# Patient Record
Sex: FEMALE | Race: WHITE | HISPANIC OR LATINO | Employment: OTHER | URBAN - METROPOLITAN AREA
[De-identification: names, ages, dates, MRNs, and addresses within clinical notes are randomized per-mention and may not be internally consistent; named-entity substitution may affect disease eponyms.]

---

## 2017-07-05 ENCOUNTER — LAB REQUISITION (OUTPATIENT)
Dept: LAB | Facility: HOSPITAL | Age: 62
End: 2017-07-05
Payer: COMMERCIAL

## 2017-07-05 ENCOUNTER — ALLSCRIPTS OFFICE VISIT (OUTPATIENT)
Dept: OTHER | Facility: OTHER | Age: 62
End: 2017-07-05

## 2017-07-05 DIAGNOSIS — Z01.419 ENCOUNTER FOR GYNECOLOGICAL EXAMINATION WITHOUT ABNORMAL FINDING: ICD-10-CM

## 2017-07-05 PROCEDURE — G0145 SCR C/V CYTO,THINLAYER,RESCR: HCPCS | Performed by: OBSTETRICS & GYNECOLOGY

## 2017-07-12 LAB
LAB AP GYN PRIMARY INTERPRETATION: NORMAL
Lab: NORMAL

## 2017-08-26 DIAGNOSIS — Z12.31 ENCOUNTER FOR SCREENING MAMMOGRAM FOR MALIGNANT NEOPLASM OF BREAST: ICD-10-CM

## 2017-09-05 LAB
ALBUMIN SERPL-MCNC: 4 G/DL (ref 3.6–5.1)
ALBUMIN/GLOB SERPL: 1.3 (CALC) (ref 1–2.5)
ALP SERPL-CCNC: 108 U/L (ref 33–130)
ALT SERPL-CCNC: 11 U/L (ref 6–29)
AST SERPL-CCNC: 11 U/L (ref 10–35)
BASOPHILS # BLD AUTO: 38 CELLS/UL (ref 0–200)
BASOPHILS NFR BLD AUTO: 0.7 %
BILIRUB SERPL-MCNC: 0.4 MG/DL (ref 0.2–1.2)
BUN SERPL-MCNC: 11 MG/DL (ref 7–25)
BUN/CREAT SERPL: ABNORMAL (CALC) (ref 6–22)
C PEPTIDE SERPL-MCNC: 0.52 NG/ML (ref 0.8–3.85)
CALCIUM SERPL-MCNC: 9.3 MG/DL (ref 8.6–10.4)
CHLORIDE SERPL-SCNC: 107 MMOL/L (ref 98–110)
CHOLEST SERPL-MCNC: 238 MG/DL
CHOLEST/HDLC SERPL: 5.8 (CALC)
CO2 SERPL-SCNC: 28 MMOL/L (ref 20–31)
CREAT SERPL-MCNC: 0.56 MG/DL (ref 0.5–0.99)
EOSINOPHIL # BLD AUTO: 140 CELLS/UL (ref 15–500)
EOSINOPHIL NFR BLD AUTO: 2.6 %
ERYTHROCYTE [DISTWIDTH] IN BLOOD BY AUTOMATED COUNT: 12.4 % (ref 11–15)
GLOBULIN SER CALC-MCNC: 3 G/DL (CALC) (ref 1.9–3.7)
GLUCOSE SERPL-MCNC: 100 MG/DL (ref 65–99)
HBA1C MFR BLD: 11.3 % OF TOTAL HGB
HCT VFR BLD AUTO: 41.7 % (ref 35–45)
HDLC SERPL-MCNC: 41 MG/DL
HGB BLD-MCNC: 13.7 G/DL (ref 11.7–15.5)
LDLC SERPL CALC-MCNC: 171 MG/DL (CALC)
LYMPHOCYTES # BLD AUTO: 2074 CELLS/UL (ref 850–3900)
LYMPHOCYTES NFR BLD AUTO: 38.4 %
MCH RBC QN AUTO: 29 PG (ref 27–33)
MCHC RBC AUTO-ENTMCNC: 32.9 G/DL (ref 32–36)
MCV RBC AUTO: 88.3 FL (ref 80–100)
MONOCYTES # BLD AUTO: 297 CELLS/UL (ref 200–950)
MONOCYTES NFR BLD AUTO: 5.5 %
NEUTROPHILS # BLD AUTO: 2851 CELLS/UL (ref 1500–7800)
NEUTROPHILS NFR BLD AUTO: 52.8 %
NONHDLC SERPL-MCNC: 197 MG/DL (CALC)
PLATELET # BLD AUTO: 337 THOUSAND/UL (ref 140–400)
PMV BLD REES-ECKER: 10.8 FL (ref 7.5–12.5)
POTASSIUM SERPL-SCNC: 4.6 MMOL/L (ref 3.5–5.3)
PROT SERPL-MCNC: 7 G/DL (ref 6.1–8.1)
RBC # BLD AUTO: 4.72 MILLION/UL (ref 3.8–5.1)
SL AMB EGFR AFRICAN AMERICAN: 117 ML/MIN/1.73M2
SL AMB EGFR NON AFRICAN AMERICAN: 101 ML/MIN/1.73M2
SODIUM SERPL-SCNC: 142 MMOL/L (ref 135–146)
TRIGL SERPL-MCNC: 128 MG/DL
TSH SERPL-ACNC: 1.96 MIU/L (ref 0.4–4.5)
WBC # BLD AUTO: 5.4 THOUSAND/UL (ref 3.8–10.8)

## 2017-11-15 ENCOUNTER — ALLSCRIPTS OFFICE VISIT (OUTPATIENT)
Dept: OTHER | Facility: OTHER | Age: 62
End: 2017-11-15

## 2017-11-15 ENCOUNTER — HOSPITAL ENCOUNTER (OUTPATIENT)
Dept: RADIOLOGY | Facility: HOSPITAL | Age: 62
Discharge: HOME | End: 2017-11-15
Attending: ORTHOPAEDIC SURGERY
Payer: COMMERCIAL

## 2017-11-15 ENCOUNTER — APPOINTMENT (OUTPATIENT)
Dept: OCCUPATIONAL THERAPY | Facility: HOSPITAL | Age: 62
End: 2017-11-15
Payer: COMMERCIAL

## 2017-11-15 DIAGNOSIS — M65.332 TRIGGER MIDDLE FINGER OF LEFT HAND: ICD-10-CM

## 2017-11-15 PROCEDURE — G8991 OTHER PT/OT GOAL STATUS: HCPCS

## 2017-11-15 PROCEDURE — L3913 HFO W/O JOINTS CF: HCPCS

## 2017-11-15 PROCEDURE — 73140 X-RAY EXAM OF FINGER(S): CPT

## 2017-11-15 PROCEDURE — G8990 OTHER PT/OT CURRENT STATUS: HCPCS

## 2017-11-16 NOTE — PROGRESS NOTES
Assessment    1  Trigger middle finger of left hand (727 03) (M65 332)    Plan  Trigger middle finger of left hand    · [facility] Instruction Sheet Hand Therapy Given; Status:Complete;   Done: [id number]  Unlinked    · * XR FINGER LEFT THIRD DIGIT-MIDDLE; Status:Active; Requested for:87Xlx9526;     Discussion/Summary    X-rays and MRIs were reviewed with patient and her daughter  versus nonconservative treatment options for trigger finger were discussed, risks versus benefits and prognosis  this time, due to patient's comorbid diabetes, we will not to administer a corticosteroid injection, but instead will have physical therapy fashion a finger splint and demonstrate home exercises  Weightbearing and activities as toleratedif condition worsensin 4-6 weeks for reevaluation      Attestation:  Jon Jorge MD, personally performed the services described in this documentation as scribed in my presence  Chief Complaint  Patient presents for left long finger pain times several months  History of Present Illness  HPI: Patient is a 44-year-old, right-hand-dominant female who presents for left long finger pain and stiffness times several months  Patient reports pain began in April 2017 after suffering an injury to the said finger while at work  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 and having finger become lodged between 2 pieces of metal on the assembly line  Patient felt pain when she attempted to pull her finger away  She was seen initially by the nurse at her place of employment  Subsequently she received x-rays and an MRI  X-rays were negative   MRI of left long finger demonstrates bone marrow edema consistent with a deep bone bruise, flexor tenosynovitis, and a small ganglion cyst at the proximal PIP joint  is here today with continued complaints of left long finger pain beginning at the base of the left long finger and radiating distally to the PIP joint   She notes associated stiffness and difficulty with flexion of the same finger at the PIP joint  She denies any numbness or tingling  is significant for DM 2past medical, surgical, family, and social history as well as medications and allergies were reviewed  Updates as needed were performed  Review of systems was recorded on a separate intake sheet, this was reviewed as well  Review of Systems   Constitutional: no fever,-- not feeling poorly-- and-- no chills  Eyes: eyes not red  ENT: no hearing loss-- and-- no nosebleeds  Cardiovascular: no chest pain-- and-- no palpitations  Respiratory: no shortness of breath-- and-- no wheezing  Gastrointestinal: no abdominal pain  Musculoskeletal: as noted in HPI  Integumentary: no skin lesions  Neurological: no numbness-- and-- no tingling  Active Problems    1  Age related osteoporosis (733 01) (M81 0)   2  Diabetes Mellitus (250 00)   3  Encounter for routine gynecological examination with Papanicolaou smear of cervix (V72 31,V76 2) (Z01 419)   4  Encounter for screening mammogram for malignant neoplasm of breast (V76 12) (Z12 31)   5  Menopausal symptom (627 2) (N95 1)   6  Osteopenia (733 90) (M85 80)   7  Osteoporosis, postmenopausal (733 01) (M81 0)   8  Postmenopausal atrophic vaginitis (627 3) (N95 2)   9  Skin rash (782 1) (R21)   10  Vaginal candidiasis (112 1) (B37 3)   11  Vulvitis (616 10) (N76 2)   12   Vulvovaginitis candida albicans (112 1) (B37 3)    Past Medical History   · History of DEXA Body Composition Study   · History of type 2 diabetes mellitus (V12 29) (Z86 39)   · History of Reported Pap Smear   · History of Summary Of Previous Pregnancies  3  (Total No )   · History of Summary Of Previous Pregnancies Para 3  (Deliveries)   · History of Tetanus (037)    Family History   · Family history of myocardial infarction (V17 3) (Z82 49)   · Family history of myocardial infarction (V17 3) (Z82 49)    Social History     · Current Every Day Smoker (305 1)   · No alcohol use   · Denied: History of Physical Abuse (History)    Current Meds   1  Boniva 150 MG Oral Tablet; TAKE 1 TABLET ONCE MONTHLY WITH 8 TO 10 OUNCES OF WATER   STAY UPRIGHT AND DO NOT EAT OR DRINK FOR 1 HOUR; Therapy: 92CWR2796 to (Evaluate:68Otr5983)  Requested for: 67DOE5747; Last Rx:83Jah1418 Ordered   2  Caltrate 600+D 600-400 MG-UNIT TABS; Therapy: (Recorded:70Mbb6804) to Recorded   3  Lalitha Reasoner FlexTouch SOPN; Therapy: (Recorded:36Dnv9418) to Recorded   4  Victoza 18 MG/3ML SOLN Recorded    Allergies  1  No Known Drug Allergies    Vitals  Signs     Heart Rate: 80  Systolic: 355  Diastolic: 74  Height: 4 ft 11 5 in  Weight: 129 lb 2 oz  BMI Calculated: 25 64  BSA Calculated: 1 54    Physical Exam     General: No acute distress, age-appropriate  HEENT: Normocephalic atraumatic, mucous membranes are moist, sclera are nonicteric  Cardiovascular: No discernable arrhythmia  Respiratory: Breathing is even and unlabored, no stridor or audible wheezing  Left Basic:  Left hand/digits  He is no gross deformity erythema edema or ecchymosis  There is mild tenderness to palpation at the base of the left long finger  With slight crepitus noted upon flexion at the PIP joint  There is a small firm tender nodule present at her about the A1 pulley  Sensation is intact distally  His capillary refill no instability noted  Future Appointments    Date/Time Provider Specialty Site   12/20/2017 04:45 PM MIN Esquivel   Orthopedic Surgery [facility]       Signatures   Electronically signed by : KINGSLEY Guthrie; Nov 15 2017  5:11PM EST                       (Author)    Electronically signed by : MIN Hernandez ; Nov 15 2017  5:25PM EST                       (Author)

## 2018-01-12 NOTE — CONSULTS
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 and having finger become lodged between 2 pieces of metal on the assembly line  Patient felt pain when she attempted to pull her finger away  She was seen initially by the nurse at her place of employment  Subsequently she received x-rays and an MRI  X-rays were negative   MRI of left long finger demonstrates bone marrow edema consistent with a deep bone bruise, flexor tenosynovitis, and a small ganglion cyst at the proximal PIP joint  Patient is here today with continued complaints of left long finger pain beginning at the base of the left long finger and radiating distally to the PIP joint  She notes associated stiffness and difficulty with flexion of the same finger at the PIP joint  She denies any numbness or tingling  PMH is significant for DM 2  The past medical, surgical, family, and social history as well as medications and allergies were reviewed  Updates as needed were performed  Review of systems was recorded on a separate intake sheet, this was reviewed as well  Review of Systems    Constitutional: no fever, not feeling poorly and no chills  Eyes: eyes not red  ENT: no hearing loss and no nosebleeds  Cardiovascular: no chest pain and no palpitations  Respiratory: no shortness of breath and no wheezing  Gastrointestinal: no abdominal pain  Musculoskeletal: as noted in HPI  Integumentary: no skin lesions  Neurological: no numbness and no tingling  Active Problems    1  Age related osteoporosis (733 01) (M81 0)   2  Diabetes Mellitus (250 00)   3   Encounter for routine gynecological examination with Papanicolaou smear of cervix   (V72 31,V76 2) (Z01 419)   4  Encounter for screening mammogram for malignant neoplasm of breast (V76 12)   (Z12 31)   5  Menopausal symptom (627 2) (N95 1)   6  Osteopenia (733 90) (M85 80)   7  Osteoporosis, postmenopausal (733 01) (M81 0)   8  Postmenopausal atrophic vaginitis (627 3) (N95 2)   9  Skin rash (782 1) (R21)   10  Vaginal candidiasis (112 1) (B37 3)   11  Vulvitis (616 10) (N76 2)   12  Vulvovaginitis candida albicans (112 1) (B37 3)    Past Medical History    · History of DEXA Body Composition Study   · History of type 2 diabetes mellitus (V12 29) (Z86 39)   · History of Reported Pap Smear   · History of Summary Of Previous Pregnancies  3  (Total No )   · History of Summary Of Previous Pregnancies Para 3  (Deliveries)   · History of Tetanus (037)    Family History    · Family history of myocardial infarction (V17 3) (Z82 49)    · Family history of myocardial infarction (V17 3) (Z82 49)    Social History    · Current Every Day Smoker (305 1)   · No alcohol use   · Denied: History of Physical Abuse (History)    Current Meds   1  Boniva 150 MG Oral Tablet; TAKE 1 TABLET ONCE MONTHLY WITH 8 TO 10 OUNCES   OF WATER   STAY UPRIGHT AND DO NOT EAT OR DRINK FOR 1 HOUR;   Therapy: 23YRT9046 to (Evaluate:2018)  Requested for: 70VRF2214; Last   Rx:12Tti9947 Ordered   2  Caltrate 600+D 600-400 MG-UNIT TABS; Therapy: (Recorded:52Ier1091) to Recorded   3  Candy [place] FlexTouch SOPN;   Therapy: (Recorded:34Hbm2707) to Recorded   4  Victoza 18 MG/3ML SOLN Recorded    Allergies    1  No Known Drug Allergies    Vitals  Signs    Heart Rate: 24QFIISGOZ: 369CAKCEHTQK: 33ZFFZRR: 4 ft 45 6 inWeight: 129 lb 2 ozBMI Calculated: 25 64BSA Calculated: 1 54    Physical Exam      General: No acute distress, age-appropriate  HEENT: Normocephalic atraumatic, mucous membranes are moist, sclera are nonicteric  Cardiovascular: No discernable arrhythmia  Respiratory: Breathing is even and unlabored, no stridor or audible wheezing  Left Basic:   Left hand/digits  He is no gross deformity erythema edema or ecchymosis  There is mild tenderness to palpation at the base of the left long finger  With slight crepitus noted upon flexion at the PIP joint  There is a small firm tender nodule present at her about the A1 pulley  Sensation is intact distally  His capillary refill no instability noted  Assessment    1  Trigger middle finger of left hand (727 03) (M65 332)    Plan     Trigger middle finger of left hand    · [facility] Instruction Sheet Hand Therapy Given; Status:Complete;   Done: 58LRE1947     Unlinked    · * XR FINGER LEFT THIRD DIGIT-MIDDLE; Status:Active; Requested for:14Leu5797;     Discussion/Summary    X-rays and MRIs were reviewed with patient and her daughter  Conservative versus nonconservative treatment options for trigger finger were discussed, risks versus benefits and prognosis  At this time, due to patient's comorbid diabetes, we will not to administer a corticosteroid injection, but instead will have physical therapy fashion a finger splint and demonstrate home exercises  Weightbearing and activities as tolerated  Call if condition worsens  Follow-up in 4-6 weeks for reevaluation        Physician Attestation:    Gianfranco Patel MD, personally performed the services described in this documentation as scribed in my presence         Signatures   Electronically signed by : KINGSLEY Harvey; Nov 15 2017  5:11PM EST                       (Author)    Electronically signed by : MIN Bello ; Nov 15 2017  5:25PM EST                       (Author)

## 2018-01-13 VITALS
WEIGHT: 129.13 LBS | DIASTOLIC BLOOD PRESSURE: 74 MMHG | BODY MASS INDEX: 25.35 KG/M2 | SYSTOLIC BLOOD PRESSURE: 151 MMHG | HEIGHT: 60 IN | HEART RATE: 80 BPM

## 2018-01-13 VITALS
DIASTOLIC BLOOD PRESSURE: 90 MMHG | HEIGHT: 60 IN | BODY MASS INDEX: 25.35 KG/M2 | WEIGHT: 129.13 LBS | SYSTOLIC BLOOD PRESSURE: 164 MMHG

## 2018-01-14 NOTE — CONSULTS
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 and having finger become lodged between 2 pieces of metal on the assembly line  Patient felt pain when she attempted to pull her finger away  She was seen initially by the nurse at her place of employment  Subsequently she received x-rays and an MRI  X-rays were negative   MRI of left long finger demonstrates bone marrow edema consistent with a deep bone bruise, flexor tenosynovitis, and a small ganglion cyst at the proximal PIP joint  Patient is here today with continued complaints of left long finger pain beginning at the base of the left long finger and radiating distally to the PIP joint  She notes associated stiffness and difficulty with flexion of the same finger at the PIP joint  She denies any numbness or tingling  PMH is significant for DM 2  The past medical, surgical, family, and social history as well as medications and allergies were reviewed  Updates as needed were performed  Review of systems was recorded on a separate intake sheet, this was reviewed as well  Review of Systems  Focused-Female Orthopedics:   Constitutional: no fever, not feeling poorly and no chills  Eyes: eyes not red  ENT: no hearing loss and no nosebleeds  Cardiovascular: no chest pain and no palpitations  Respiratory: no shortness of breath and no wheezing  Gastrointestinal: no abdominal pain  Musculoskeletal: as noted in HPI  Integumentary: no skin lesions  Neurological: no numbness and no tingling  Active Problems    1   Age related osteoporosis (983 01) (M81 0)   2  Diabetes Mellitus (250 00)   3  Encounter for routine gynecological examination with Papanicolaou smear of cervix   (V72 31,V76 2) (Z01 419)   4  Encounter for screening mammogram for malignant neoplasm of breast (V76 12)   (Z12 31)   5  Menopausal symptom (627 2) (N95 1)   6  Osteopenia (733 90) (M85 80)   7  Osteoporosis, postmenopausal (733 01) (M81 0)   8  Postmenopausal atrophic vaginitis (627 3) (N95 2)   9  Skin rash (782 1) (R21)   10  Vaginal candidiasis (112 1) (B37 3)   11  Vulvitis (616 10) (N76 2)   12  Vulvovaginitis candida albicans (112 1) (B37 3)    Past Medical History    1  History of DEXA Body Composition Study   2  History of type 2 diabetes mellitus (V12 29) (Z86 39)   3  History of Reported Pap Smear   4  History of Summary Of Previous Pregnancies  3  (Total No )   5  History of Summary Of Previous Pregnancies Para 3  (Deliveries)   6  History of Tetanus (037)    Family History    1  Family history of myocardial infarction (V17 3) (Z82 49)    2  Family history of myocardial infarction (V17 3) (Z82 49)    Social History    · Current Every Day Smoker (305 1)   · No alcohol use   · Denied: History of Physical Abuse (History)    Current Meds   1  Boniva 150 MG Oral Tablet; TAKE 1 TABLET ONCE MONTHLY WITH 8 TO 10 OUNCES   OF WATER   STAY UPRIGHT AND DO NOT EAT OR DRINK FOR 1 HOUR;   Therapy: 52XBU4374 to (Evaluate:2018)  Requested for: 43FWS4397; Last   Rx:87Rsv8081 Ordered   2  Caltrate 600+D 600-400 MG-UNIT TABS; Therapy: (Recorded:23Gve6024) to Recorded   3  The[place] FlexTouch SOPN;   Therapy: (Recorded:87Vgs5134) to Recorded   4  Victoza 18 MG/3ML SOLN Recorded    Allergies    1  No Known Drug Allergies    Vitals  Signs   Recorded: 07JAF8591 04:15PM   Heart Rate: 80  Systolic: 331  Diastolic: 74  Height: 4 ft 11 5 in  Weight: 129 lb 2 oz  BMI Calculated: 25 64  BSA Calculated: 1 54    Physical Exam      General: No acute distress, age-appropriate     HEENT: Normocephalic atraumatic, mucous membranes are moist, sclera are nonicteric  Cardiovascular: No discernable arrhythmia  Respiratory: Breathing is even and unlabored, no stridor or audible wheezing  Left Basic:   Left hand/digits  He is no gross deformity erythema edema or ecchymosis  There is mild tenderness to palpation at the base of the left long finger  With slight crepitus noted upon flexion at the PIP joint  There is a small firm tender nodule present at her about the A1 pulley  Sensation is intact distally  His capillary refill no instability noted  Assessment    1  Trigger middle finger of left hand (727 03) (M65 332)    Plan  Trigger middle finger of left hand    1  [facility] Instruction Sheet Hand Therapy Given; Status:Complete;   Done: 35MLR6278  Unlinked    2  * XR FINGER LEFT THIRD DIGIT-MIDDLE; Status:Active; Requested for:47Aeg1312;     Discussion/Summary  Discussion Summary:   X-rays and MRIs were reviewed with patient and her daughter  Conservative versus nonconservative treatment options for trigger finger were discussed, risks versus benefits and prognosis  At this time, due to patient's comorbid diabetes, we will not to administer a corticosteroid injection, but instead will have physical therapy fashion a finger splint and demonstrate home exercises  Weightbearing and activities as tolerated  Call if condition worsens  Follow-up in 4-6 weeks for reevaluation        Physician Attestation:    Santana Black MD, personally performed the services described in this documentation as scribed in my presence         Future Appointments    Signatures   Electronically signed by : KINGSLEY Zaragoza; Nov 15 2017  5:11PM EST                       (Author)    Electronically signed by : MIN Mcmahan ; Nov 15 2017  5:25PM EST                       (Author)

## 2018-08-17 ENCOUNTER — APPOINTMENT (OUTPATIENT)
Dept: LAB | Age: 63
End: 2018-08-17
Payer: COMMERCIAL

## 2018-08-17 ENCOUNTER — APPOINTMENT (OUTPATIENT)
Dept: LAB | Facility: MEDICAL CENTER | Age: 63
End: 2018-08-17
Payer: COMMERCIAL

## 2018-08-17 ENCOUNTER — TRANSCRIBE ORDERS (OUTPATIENT)
Dept: ADMINISTRATIVE | Facility: HOSPITAL | Age: 63
End: 2018-08-17

## 2018-08-17 DIAGNOSIS — I10 HYPERTENSION, UNSPECIFIED TYPE: ICD-10-CM

## 2018-08-17 DIAGNOSIS — Z79.899 ENCOUNTER FOR LONG-TERM (CURRENT) USE OF MEDICATIONS: ICD-10-CM

## 2018-08-17 DIAGNOSIS — M81.0 SENILE OSTEOPOROSIS: Primary | ICD-10-CM

## 2018-08-17 DIAGNOSIS — E78.00 PURE HYPERCHOLESTEROLEMIA: ICD-10-CM

## 2018-08-17 DIAGNOSIS — E11.43 DIABETIC AUTONOMIC NEUROPATHY ASSOCIATED WITH TYPE 2 DIABETES MELLITUS (HCC): ICD-10-CM

## 2018-08-17 DIAGNOSIS — M81.0 SENILE OSTEOPOROSIS: ICD-10-CM

## 2018-08-17 DIAGNOSIS — F17.210 CIGARETTE SMOKER: ICD-10-CM

## 2018-08-17 DIAGNOSIS — E11.8 UNCONTROLLED TYPE 2 DIABETES MELLITUS WITH COMPLICATION, UNSPECIFIED WHETHER LONG TERM INSULIN USE: ICD-10-CM

## 2018-08-17 DIAGNOSIS — E11.65 UNCONTROLLED TYPE 2 DIABETES MELLITUS WITH COMPLICATION, UNSPECIFIED WHETHER LONG TERM INSULIN USE: ICD-10-CM

## 2018-08-17 DIAGNOSIS — E55.9 VITAMIN D DEFICIENCY: ICD-10-CM

## 2018-08-17 LAB
ALBUMIN SERPL BCP-MCNC: 3.5 G/DL (ref 3.5–5)
ALP SERPL-CCNC: 91 U/L (ref 46–116)
ALT SERPL W P-5'-P-CCNC: 16 U/L (ref 12–78)
ANION GAP SERPL CALCULATED.3IONS-SCNC: 7 MMOL/L (ref 4–13)
AST SERPL W P-5'-P-CCNC: 12 U/L (ref 5–45)
BASOPHILS # BLD AUTO: 0.05 THOUSANDS/ΜL (ref 0–0.1)
BASOPHILS NFR BLD AUTO: 1 % (ref 0–1)
BILIRUB SERPL-MCNC: 0.38 MG/DL (ref 0.2–1)
BUN SERPL-MCNC: 10 MG/DL (ref 5–25)
CALCIUM SERPL-MCNC: 9.3 MG/DL (ref 8.3–10.1)
CHLORIDE SERPL-SCNC: 105 MMOL/L (ref 100–108)
CHOLEST SERPL-MCNC: 257 MG/DL (ref 50–200)
CO2 SERPL-SCNC: 27 MMOL/L (ref 21–32)
CREAT SERPL-MCNC: 0.69 MG/DL (ref 0.6–1.3)
EOSINOPHIL # BLD AUTO: 0.19 THOUSAND/ΜL (ref 0–0.61)
EOSINOPHIL NFR BLD AUTO: 3 % (ref 0–6)
ERYTHROCYTE [DISTWIDTH] IN BLOOD BY AUTOMATED COUNT: 12.4 % (ref 11.6–15.1)
EST. AVERAGE GLUCOSE BLD GHB EST-MCNC: 200 MG/DL
GFR SERPL CREATININE-BSD FRML MDRD: 94 ML/MIN/1.73SQ M
GLUCOSE P FAST SERPL-MCNC: 119 MG/DL (ref 65–99)
HBA1C MFR BLD: 8.6 % (ref 4.2–6.3)
HCT VFR BLD AUTO: 43.2 % (ref 34.8–46.1)
HDLC SERPL-MCNC: 44 MG/DL (ref 40–60)
HGB BLD-MCNC: 13.7 G/DL (ref 11.5–15.4)
IMM GRANULOCYTES # BLD AUTO: 0.02 THOUSAND/UL (ref 0–0.2)
IMM GRANULOCYTES NFR BLD AUTO: 0 % (ref 0–2)
LDLC SERPL CALC-MCNC: 189 MG/DL (ref 0–100)
LYMPHOCYTES # BLD AUTO: 2.28 THOUSANDS/ΜL (ref 0.6–4.47)
LYMPHOCYTES NFR BLD AUTO: 32 % (ref 14–44)
MCH RBC QN AUTO: 29.4 PG (ref 26.8–34.3)
MCHC RBC AUTO-ENTMCNC: 31.7 G/DL (ref 31.4–37.4)
MCV RBC AUTO: 93 FL (ref 82–98)
MONOCYTES # BLD AUTO: 0.45 THOUSAND/ΜL (ref 0.17–1.22)
MONOCYTES NFR BLD AUTO: 6 % (ref 4–12)
NEUTROPHILS # BLD AUTO: 4.11 THOUSANDS/ΜL (ref 1.85–7.62)
NEUTS SEG NFR BLD AUTO: 58 % (ref 43–75)
NONHDLC SERPL-MCNC: 213 MG/DL
NRBC BLD AUTO-RTO: 0 /100 WBCS
PLATELET # BLD AUTO: 310 THOUSANDS/UL (ref 149–390)
PMV BLD AUTO: 11.3 FL (ref 8.9–12.7)
POTASSIUM SERPL-SCNC: 4.7 MMOL/L (ref 3.5–5.3)
PROT SERPL-MCNC: 7.8 G/DL (ref 6.4–8.2)
RBC # BLD AUTO: 4.66 MILLION/UL (ref 3.81–5.12)
SODIUM SERPL-SCNC: 139 MMOL/L (ref 136–145)
TRIGL SERPL-MCNC: 118 MG/DL
TSH SERPL DL<=0.05 MIU/L-ACNC: 1.85 UIU/ML (ref 0.36–3.74)
WBC # BLD AUTO: 7.1 THOUSAND/UL (ref 4.31–10.16)

## 2018-08-17 PROCEDURE — 83036 HEMOGLOBIN GLYCOSYLATED A1C: CPT

## 2018-08-17 PROCEDURE — 85025 COMPLETE CBC W/AUTO DIFF WBC: CPT

## 2018-08-17 PROCEDURE — 80053 COMPREHEN METABOLIC PANEL: CPT

## 2018-08-17 PROCEDURE — 84443 ASSAY THYROID STIM HORMONE: CPT

## 2018-08-17 PROCEDURE — 84681 ASSAY OF C-PEPTIDE: CPT

## 2018-08-17 PROCEDURE — 80061 LIPID PANEL: CPT

## 2018-08-17 PROCEDURE — 36415 COLL VENOUS BLD VENIPUNCTURE: CPT

## 2018-08-18 LAB — C PEPTIDE SERPL-MCNC: 0.9 NG/ML (ref 1.1–4.4)

## 2018-11-16 ENCOUNTER — LAB REQUISITION (OUTPATIENT)
Dept: LAB | Facility: HOSPITAL | Age: 63
End: 2018-11-16
Payer: COMMERCIAL

## 2018-11-16 DIAGNOSIS — M65.341 TRIGGER RING FINGER OF RIGHT HAND: ICD-10-CM

## 2018-11-16 PROCEDURE — 88305 TISSUE EXAM BY PATHOLOGIST: CPT | Performed by: PATHOLOGY

## 2018-12-13 ENCOUNTER — OFFICE VISIT (OUTPATIENT)
Dept: GYNECOLOGY | Facility: CLINIC | Age: 63
End: 2018-12-13
Payer: COMMERCIAL

## 2018-12-13 VITALS
DIASTOLIC BLOOD PRESSURE: 70 MMHG | BODY MASS INDEX: 25.21 KG/M2 | SYSTOLIC BLOOD PRESSURE: 138 MMHG | WEIGHT: 128.4 LBS | HEIGHT: 60 IN

## 2018-12-13 DIAGNOSIS — M81.0 AGE-RELATED OSTEOPOROSIS WITHOUT CURRENT PATHOLOGICAL FRACTURE: ICD-10-CM

## 2018-12-13 DIAGNOSIS — Z12.4 ENCOUNTER FOR PAPANICOLAOU SMEAR FOR CERVICAL CANCER SCREENING: ICD-10-CM

## 2018-12-13 DIAGNOSIS — Z13.820 ENCOUNTER FOR SCREENING FOR OSTEOPOROSIS: Primary | ICD-10-CM

## 2018-12-13 DIAGNOSIS — Z78.0 MENOPAUSE: ICD-10-CM

## 2018-12-13 DIAGNOSIS — Z12.31 ENCOUNTER FOR SCREENING MAMMOGRAM FOR MALIGNANT NEOPLASM OF BREAST: ICD-10-CM

## 2018-12-13 DIAGNOSIS — B37.9 CANDIDIASIS: ICD-10-CM

## 2018-12-13 PROCEDURE — G0145 SCR C/V CYTO,THINLAYER,RESCR: HCPCS | Performed by: OBSTETRICS & GYNECOLOGY

## 2018-12-13 PROCEDURE — S0612 ANNUAL GYNECOLOGICAL EXAMINA: HCPCS | Performed by: OBSTETRICS & GYNECOLOGY

## 2018-12-13 RX ORDER — INSULIN DEGLUDEC INJECTION 100 U/ML
INJECTION, SOLUTION SUBCUTANEOUS
Refills: 2 | COMMUNITY
Start: 2018-10-14 | End: 2019-08-22 | Stop reason: HOSPADM

## 2018-12-13 RX ORDER — GABAPENTIN 300 MG/1
CAPSULE ORAL
COMMUNITY
End: 2019-08-22 | Stop reason: HOSPADM

## 2018-12-13 RX ORDER — MELATONIN
1000 DAILY
COMMUNITY
End: 2022-07-19 | Stop reason: ALTCHOICE

## 2018-12-13 RX ORDER — CLOTRIMAZOLE AND BETAMETHASONE DIPROPIONATE 10; .64 MG/G; MG/G
CREAM TOPICAL 2 TIMES DAILY
Qty: 30 G | Refills: 0 | Status: SHIPPED | OUTPATIENT
Start: 2018-12-13 | End: 2019-08-22 | Stop reason: HOSPADM

## 2018-12-13 NOTE — PROGRESS NOTES
Assessment/Plan:    No problem-specific Assessment & Plan notes found for this encounter  Diagnoses and all orders for this visit:    Encounter for screening for osteoporosis  -     DXA bone density spine hip and pelvis; Future    Menopause  -     DXA bone density spine hip and pelvis; Future    Encounter for screening mammogram for malignant neoplasm of breast  -     Mammo screening bilateral w 3d & cad; Future    Age-related osteoporosis without current pathological fracture    Candidiasis  -     terconazole (TERAZOL 7) 0 4 % vaginal cream; Insert 1 applicator into the vagina daily at bedtime  -     clotrimazole-betamethasone (LOTRISONE) 1-0 05 % cream; Apply topically 2 (two) times a day    Other orders  -     TRESIBA FLEXTOUCH 100 units/mL injection pen; INJECT 32 UNITS AT BEDTIME  -     gabapentin (NEURONTIN) 300 mg capsule; gabapentin 300 mg capsule  -     cholecalciferol (VITAMIN D3) 1,000 units tablet; Take 1,000 Units by mouth daily        Subjective:      Patient ID: Abhilash Galvez is a 61 y o  female  HPI  Annual exam  Also c/o vaginal itching and discharge  Recently on antibiotics  Also has DM    Hx osteoporosis  Previously on Boniva, but discontinued it on her own a few months ago  Last DEXA 8/2016    The following portions of the patient's history were reviewed and updated as appropriate: allergies, current medications, past family history, past medical history, past social history, past surgical history and problem list     Review of Systems   Constitutional: Negative  Gastrointestinal: Negative  Genitourinary: Positive for vaginal discharge  Negative for difficulty urinating, dyspareunia, dysuria, frequency, genital sores, hematuria, pelvic pain, urgency, vaginal bleeding and vaginal pain  Objective:      /70 (BP Location: Right arm)   Ht 4' 11 5" (1 511 m)   Wt 58 2 kg (128 lb 6 4 oz)   BMI 25 50 kg/m²          Physical Exam   Constitutional: She appears well-nourished  Neck: Normal range of motion  Neck supple  No thyromegaly present  Cardiovascular: Normal rate, regular rhythm and normal heart sounds  Pulmonary/Chest: Effort normal and breath sounds normal  No respiratory distress  Right breast exhibits no inverted nipple, no mass, no nipple discharge, no skin change and no tenderness  Left breast exhibits no inverted nipple, no mass, no nipple discharge, no skin change and no tenderness  Abdominal: Soft  Bowel sounds are normal  She exhibits no distension and no mass  There is no tenderness  There is no rebound and no guarding  Hernia confirmed negative in the right inguinal area and confirmed negative in the left inguinal area  Genitourinary: There is rash on the right labia  There is no lesion on the right labia  There is rash on the left labia  There is no lesion on the left labia  Uterus is not deviated, not enlarged, not fixed and not tender  Cervix exhibits no motion tenderness, no discharge and no friability  Right adnexum displays no mass, no tenderness and no fullness  Left adnexum displays no mass, no tenderness and no fullness  Vaginal discharge found  Lymphadenopathy:        Right: No inguinal adenopathy present  Left: No inguinal adenopathy present

## 2018-12-18 LAB
LAB AP GYN PRIMARY INTERPRETATION: NORMAL
Lab: NORMAL

## 2019-03-22 LAB — HCV AB SER-ACNC: NEGATIVE

## 2019-08-19 ENCOUNTER — APPOINTMENT (EMERGENCY)
Dept: RADIOLOGY | Facility: HOSPITAL | Age: 64
DRG: 683 | End: 2019-08-19
Payer: COMMERCIAL

## 2019-08-19 ENCOUNTER — HOSPITAL ENCOUNTER (INPATIENT)
Facility: HOSPITAL | Age: 64
LOS: 1 days | Discharge: HOME | DRG: 683 | End: 2019-08-22
Attending: EMERGENCY MEDICINE | Admitting: INTERNAL MEDICINE
Payer: COMMERCIAL

## 2019-08-19 DIAGNOSIS — E11.65 TYPE 2 DIABETES MELLITUS WITH HYPERGLYCEMIA, WITH LONG-TERM CURRENT USE OF INSULIN (HCC): ICD-10-CM

## 2019-08-19 DIAGNOSIS — E11.9 TYPE 2 DIABETES MELLITUS, WITH LONG-TERM CURRENT USE OF INSULIN (HCC): ICD-10-CM

## 2019-08-19 DIAGNOSIS — N17.9 AKI (ACUTE KIDNEY INJURY) (HCC): ICD-10-CM

## 2019-08-19 DIAGNOSIS — R61 DIAPHORESIS: Primary | ICD-10-CM

## 2019-08-19 DIAGNOSIS — Z79.4 TYPE 2 DIABETES MELLITUS WITH HYPERGLYCEMIA, WITH LONG-TERM CURRENT USE OF INSULIN (HCC): ICD-10-CM

## 2019-08-19 DIAGNOSIS — E78.5 HLD (HYPERLIPIDEMIA): ICD-10-CM

## 2019-08-19 DIAGNOSIS — Z79.4 TYPE 2 DIABETES MELLITUS, WITH LONG-TERM CURRENT USE OF INSULIN (HCC): ICD-10-CM

## 2019-08-19 DIAGNOSIS — G62.9 NEUROPATHY: ICD-10-CM

## 2019-08-19 DIAGNOSIS — R73.9 HYPERGLYCEMIA: ICD-10-CM

## 2019-08-19 DIAGNOSIS — E11.65 POORLY CONTROLLED DIABETES MELLITUS (HCC): ICD-10-CM

## 2019-08-19 PROBLEM — J20.9 ACUTE BRONCHITIS: Status: ACTIVE | Noted: 2019-08-19

## 2019-08-19 PROBLEM — R03.0 ELEVATED BLOOD PRESSURE READING: Status: ACTIVE | Noted: 2019-08-19

## 2019-08-19 PROBLEM — E87.1 HYPONATREMIA: Status: ACTIVE | Noted: 2019-08-19

## 2019-08-19 PROBLEM — Z72.0 TOBACCO ABUSE: Status: ACTIVE | Noted: 2019-08-19

## 2019-08-19 LAB
ANION GAP SERPL CALCULATED.3IONS-SCNC: 6 MMOL/L (ref 4–13)
ATRIAL RATE: 80 BPM
BASOPHILS # BLD AUTO: 0.06 THOUSANDS/ΜL (ref 0–0.1)
BASOPHILS NFR BLD AUTO: 1 % (ref 0–1)
BILIRUB UR QL STRIP: NEGATIVE
BUN SERPL-MCNC: 20 MG/DL (ref 5–25)
CALCIUM SERPL-MCNC: 9.3 MG/DL (ref 8.3–10.1)
CHLORIDE SERPL-SCNC: 91 MMOL/L (ref 100–108)
CLARITY UR: CLEAR
CLARITY, POC: CLEAR
CO2 SERPL-SCNC: 29 MMOL/L (ref 21–32)
COLOR UR: YELLOW
CREAT SERPL-MCNC: 1.5 MG/DL (ref 0.6–1.3)
EOSINOPHIL # BLD AUTO: 0.07 THOUSAND/ΜL (ref 0–0.61)
EOSINOPHIL NFR BLD AUTO: 1 % (ref 0–6)
ERYTHROCYTE [DISTWIDTH] IN BLOOD BY AUTOMATED COUNT: 11.9 % (ref 11.6–15.1)
GFR SERPL CREATININE-BSD FRML MDRD: 37 ML/MIN/1.73SQ M
GLUCOSE SERPL-MCNC: 190 MG/DL (ref 65–140)
GLUCOSE SERPL-MCNC: 252 MG/DL (ref 65–140)
GLUCOSE SERPL-MCNC: 513 MG/DL (ref 65–140)
GLUCOSE UR STRIP-MCNC: ABNORMAL MG/DL
HCT VFR BLD AUTO: 39.6 % (ref 34.8–46.1)
HGB BLD-MCNC: 13.6 G/DL (ref 11.5–15.4)
HGB UR QL STRIP.AUTO: NEGATIVE
IMM GRANULOCYTES # BLD AUTO: 0.09 THOUSAND/UL (ref 0–0.2)
IMM GRANULOCYTES NFR BLD AUTO: 1 % (ref 0–2)
KETONES UR STRIP-MCNC: NEGATIVE MG/DL
LEUKOCYTE ESTERASE UR QL STRIP: NEGATIVE
LYMPHOCYTES # BLD AUTO: 1.76 THOUSANDS/ΜL (ref 0.6–4.47)
LYMPHOCYTES NFR BLD AUTO: 14 % (ref 14–44)
MCH RBC QN AUTO: 30.2 PG (ref 26.8–34.3)
MCHC RBC AUTO-ENTMCNC: 34.3 G/DL (ref 31.4–37.4)
MCV RBC AUTO: 88 FL (ref 82–98)
MONOCYTES # BLD AUTO: 0.52 THOUSAND/ΜL (ref 0.17–1.22)
MONOCYTES NFR BLD AUTO: 4 % (ref 4–12)
NEUTROPHILS # BLD AUTO: 9.91 THOUSANDS/ΜL (ref 1.85–7.62)
NEUTS SEG NFR BLD AUTO: 79 % (ref 43–75)
NITRITE UR QL STRIP: NEGATIVE
NRBC BLD AUTO-RTO: 0 /100 WBCS
P AXIS: 72 DEGREES
PH UR STRIP.AUTO: 6 [PH] (ref 4.5–8)
PLATELET # BLD AUTO: 340 THOUSANDS/UL (ref 149–390)
PMV BLD AUTO: 10.4 FL (ref 8.9–12.7)
POTASSIUM SERPL-SCNC: 4.8 MMOL/L (ref 3.5–5.3)
PR INTERVAL: 140 MS
PROT UR STRIP-MCNC: NEGATIVE MG/DL
QRS AXIS: -10 DEGREES
QRSD INTERVAL: 70 MS
QT INTERVAL: 380 MS
QTC INTERVAL: 438 MS
RBC # BLD AUTO: 4.5 MILLION/UL (ref 3.81–5.12)
SODIUM SERPL-SCNC: 126 MMOL/L (ref 136–145)
SP GR UR STRIP.AUTO: 1.01 (ref 1–1.03)
T WAVE AXIS: 59 DEGREES
TROPONIN I SERPL-MCNC: <0.02 NG/ML
UROBILINOGEN UR QL STRIP.AUTO: 0.2 E.U./DL
VENTRICULAR RATE: 80 BPM
WBC # BLD AUTO: 12.41 THOUSAND/UL (ref 4.31–10.16)

## 2019-08-19 PROCEDURE — 81003 URINALYSIS AUTO W/O SCOPE: CPT

## 2019-08-19 PROCEDURE — 82948 REAGENT STRIP/BLOOD GLUCOSE: CPT

## 2019-08-19 PROCEDURE — 84484 ASSAY OF TROPONIN QUANT: CPT | Performed by: EMERGENCY MEDICINE

## 2019-08-19 PROCEDURE — 99285 EMERGENCY DEPT VISIT HI MDM: CPT

## 2019-08-19 PROCEDURE — 96360 HYDRATION IV INFUSION INIT: CPT

## 2019-08-19 PROCEDURE — 99285 EMERGENCY DEPT VISIT HI MDM: CPT | Performed by: EMERGENCY MEDICINE

## 2019-08-19 PROCEDURE — 94640 AIRWAY INHALATION TREATMENT: CPT

## 2019-08-19 PROCEDURE — 36415 COLL VENOUS BLD VENIPUNCTURE: CPT | Performed by: EMERGENCY MEDICINE

## 2019-08-19 PROCEDURE — 93010 ELECTROCARDIOGRAM REPORT: CPT | Performed by: INTERNAL MEDICINE

## 2019-08-19 PROCEDURE — 71046 X-RAY EXAM CHEST 2 VIEWS: CPT

## 2019-08-19 PROCEDURE — 94760 N-INVAS EAR/PLS OXIMETRY 1: CPT

## 2019-08-19 PROCEDURE — 80048 BASIC METABOLIC PNL TOTAL CA: CPT | Performed by: EMERGENCY MEDICINE

## 2019-08-19 PROCEDURE — 99219 PR INITIAL OBSERVATION CARE/DAY 50 MINUTES: CPT | Performed by: NURSE PRACTITIONER

## 2019-08-19 PROCEDURE — 93005 ELECTROCARDIOGRAM TRACING: CPT

## 2019-08-19 PROCEDURE — 85025 COMPLETE CBC W/AUTO DIFF WBC: CPT | Performed by: EMERGENCY MEDICINE

## 2019-08-19 RX ORDER — LEVALBUTEROL 1.25 MG/.5ML
1.25 SOLUTION, CONCENTRATE RESPIRATORY (INHALATION)
Status: DISCONTINUED | OUTPATIENT
Start: 2019-08-19 | End: 2019-08-22 | Stop reason: HOSPADM

## 2019-08-19 RX ORDER — MELATONIN
1000 DAILY
Status: DISCONTINUED | OUTPATIENT
Start: 2019-08-20 | End: 2019-08-22 | Stop reason: HOSPADM

## 2019-08-19 RX ORDER — AZITHROMYCIN 250 MG/1
250 TABLET, FILM COATED ORAL EVERY 24 HOURS
Status: COMPLETED | OUTPATIENT
Start: 2019-08-20 | End: 2019-08-20

## 2019-08-19 RX ORDER — HYDRALAZINE HYDROCHLORIDE 20 MG/ML
5 INJECTION INTRAMUSCULAR; INTRAVENOUS EVERY 6 HOURS PRN
Status: DISCONTINUED | OUTPATIENT
Start: 2019-08-19 | End: 2019-08-22 | Stop reason: HOSPADM

## 2019-08-19 RX ORDER — NICOTINE 21 MG/24HR
1 PATCH, TRANSDERMAL 24 HOURS TRANSDERMAL DAILY
Status: DISCONTINUED | OUTPATIENT
Start: 2019-08-20 | End: 2019-08-22 | Stop reason: HOSPADM

## 2019-08-19 RX ORDER — INSULIN GLARGINE 100 [IU]/ML
30 INJECTION, SOLUTION SUBCUTANEOUS
Status: DISCONTINUED | OUTPATIENT
Start: 2019-08-19 | End: 2019-08-19

## 2019-08-19 RX ORDER — ACETAMINOPHEN 325 MG/1
650 TABLET ORAL EVERY 6 HOURS PRN
Status: DISCONTINUED | OUTPATIENT
Start: 2019-08-19 | End: 2019-08-22 | Stop reason: HOSPADM

## 2019-08-19 RX ORDER — GABAPENTIN 300 MG/1
300 CAPSULE ORAL 3 TIMES DAILY
Status: DISCONTINUED | OUTPATIENT
Start: 2019-08-19 | End: 2019-08-22 | Stop reason: HOSPADM

## 2019-08-19 RX ORDER — HEPARIN SODIUM 5000 [USP'U]/ML
5000 INJECTION, SOLUTION INTRAVENOUS; SUBCUTANEOUS EVERY 8 HOURS SCHEDULED
Status: DISCONTINUED | OUTPATIENT
Start: 2019-08-19 | End: 2019-08-22 | Stop reason: HOSPADM

## 2019-08-19 RX ORDER — ONDANSETRON 2 MG/ML
4 INJECTION INTRAMUSCULAR; INTRAVENOUS EVERY 6 HOURS PRN
Status: DISCONTINUED | OUTPATIENT
Start: 2019-08-19 | End: 2019-08-22 | Stop reason: HOSPADM

## 2019-08-19 RX ORDER — SODIUM CHLORIDE 9 MG/ML
100 INJECTION, SOLUTION INTRAVENOUS CONTINUOUS
Status: DISCONTINUED | OUTPATIENT
Start: 2019-08-19 | End: 2019-08-22

## 2019-08-19 RX ORDER — SODIUM CHLORIDE FOR INHALATION 0.9 %
3 VIAL, NEBULIZER (ML) INHALATION
Status: DISCONTINUED | OUTPATIENT
Start: 2019-08-19 | End: 2019-08-22 | Stop reason: HOSPADM

## 2019-08-19 RX ORDER — DOCUSATE SODIUM 100 MG/1
100 CAPSULE, LIQUID FILLED ORAL 2 TIMES DAILY
Status: DISCONTINUED | OUTPATIENT
Start: 2019-08-19 | End: 2019-08-22 | Stop reason: HOSPADM

## 2019-08-19 RX ORDER — ATORVASTATIN CALCIUM 80 MG/1
80 TABLET, FILM COATED ORAL
Status: DISCONTINUED | OUTPATIENT
Start: 2019-08-19 | End: 2019-08-22 | Stop reason: HOSPADM

## 2019-08-19 RX ADMIN — DOCUSATE SODIUM 100 MG: 100 CAPSULE, LIQUID FILLED ORAL at 18:35

## 2019-08-19 RX ADMIN — SODIUM CHLORIDE 4 UNITS/HR: 9 INJECTION, SOLUTION INTRAVENOUS at 18:25

## 2019-08-19 RX ADMIN — HEPARIN SODIUM 5000 UNITS: 5000 INJECTION INTRAVENOUS; SUBCUTANEOUS at 21:08

## 2019-08-19 RX ADMIN — GABAPENTIN 300 MG: 300 CAPSULE ORAL at 18:35

## 2019-08-19 RX ADMIN — SODIUM CHLORIDE 100 ML/HR: 0.9 INJECTION, SOLUTION INTRAVENOUS at 18:25

## 2019-08-19 RX ADMIN — SODIUM CHLORIDE 1000 ML: 0.9 INJECTION, SOLUTION INTRAVENOUS at 14:57

## 2019-08-19 RX ADMIN — GABAPENTIN 300 MG: 300 CAPSULE ORAL at 21:08

## 2019-08-19 RX ADMIN — HEPARIN SODIUM 5000 UNITS: 5000 INJECTION INTRAVENOUS; SUBCUTANEOUS at 18:39

## 2019-08-19 RX ADMIN — ISODIUM CHLORIDE 3 ML: 0.03 SOLUTION RESPIRATORY (INHALATION) at 19:12

## 2019-08-19 RX ADMIN — LEVALBUTEROL 1.25 MG: 1.25 SOLUTION, CONCENTRATE RESPIRATORY (INHALATION) at 19:12

## 2019-08-19 RX ADMIN — ATORVASTATIN CALCIUM 80 MG: 80 TABLET, FILM COATED ORAL at 18:35

## 2019-08-19 NOTE — H&P
H&P- Funmilayo Art 1955, 61 y o  female MRN: [id number]    Unit/Bed#: [facility] Encounter: [id number]    Primary Care Provider: No primary care provider on file  Date and time admitted to hospital: 8/19/2019  1:12 PM        Diaphoresis  Assessment & Plan  · Now resolved- initially presenting symptom  · R/o cardiac related- TYLER score of 1  · Trop x1 negative   · ekg NSR   · Follow on telemetry     Type 2 diabetes mellitus, with long-term current use of insulin ([facility])  Assessment & Plan  Lab Results   Component Value Date    HGBA1C 8 6 (H) 08/17/2018       No results for input(s): POCGLU in the last 72 hours      Blood Sugar Average: Last 72 hrs:  · blood sugar on BMP was 513  · Last a1c check on 8/3 was 10 9  · She is currently on xigduo 5-1000 at home, victoza 18mg and tresiba 32 units at HS- will hold for now and start noncritical care insulin gtt while hospitalized   · CCD   · Will rehydrate with normal saline 100ml/hr   · Consult endocrinology   · C/w neurontin for neuropathy     LEON (acute kidney injury) ([facility] [address] )  Assessment & Plan  · Likely prerenal- suspect dehydration as a possible cause  · S/p 1L of NSS in the ED  · C/w normal saline at 100ml/hr   · Repeat am bmp   · Avoid nephrotoxic drugs and hypotension  · Check PVR x1 to r/o retention  · Check ua in the setting of urinary frequency- although this can be secondray to hyperglycemia     * Hyponatremia  Assessment & Plan  · Sodium corrected in setting of hyperglycemia is 133  · Trial ivfs and monitor  · Check urine osmo, urine na and serum osmo  · Check tsh   · Repeat am bmp     Elevated blood pressure reading  Assessment & Plan  · No hx of htn  · Monitor BP   · Prn hydralazine for SBP > 185    Tobacco abuse  Assessment & Plan  · Add patch/encourage cessation    HLD (hyperlipidemia)  Assessment & Plan  · C/w statin     Acute bronchitis  Assessment & Plan  · Dx last week at urgent care and started on PO azithro   · Chest xray without acute abnormality detected  · Add respiratory protocol/nebs  · Pt has one more day of azithro that she will need to take for tomorrow and then d/c abxs   · Add mucinex     Depression  Assessment & Plan  · Pt denies any depression or SI at this time     Osteopenia  Assessment & Plan  · C/w vitamin d at discharge     VTE Prophylaxis: Heparin  / sequential compression device   Code Status: full code   POLST: POLST form is not discussed and not completed at this time  Discussion with family: d/w daughter at bedside     Anticipated Length of Stay:  Patient will be admitted on an Observation basis with an anticipated length of stay of  Less than  2 midnights  Justification for Hospital Stay: hyperglycemia, hyponatremia and LEON     Total Time for Visit, including Counseling / Coordination of Care: 1 hour  Greater than 50% of this total time spent on direct patient counseling and coordination of care  Chief Complaint:   Diaphoresis     History of Present Illness:    Kailyn Jean Baptiste is a 61 y o  female with a PMH of DM2 with neuropathy, tobacco abuse and HLD who presents with diaphoresis  Pt reported she was at work when she started to sweat through her clothes  She went to the workplace wellness center and they checked her blood sugar and it was in the 300s  They did a UA and there was ketones in her urine and they recommended she come to the ER  When she arrived to the ER she was given 1L of NSS and her bmp revealed a creatinine of 1 5; NA of 126 and glucose of 513  She admits to being compliant with her medications at home and takes tresiba 32 units at HS and xugduo 5-1000  The patient denied any chest pain, pressure, palpitations or leg swelling  She does admit to polydipsia, blurry vision at work that resolved when she got to the ER and urinary frequency  She reports that since being admitted to the hospital she is feeling better and no longer has diaphoresis       The patient reports recently being treated at an urgent care center on Friday for bronchitis with azithromycin and has one more day left  She still persists with a cough productive of yellow sputum, rhinorhea and wheezing  She does admit to smoking 1/2 ppd for the past 25 years but is interested in quitting smoking  Review of Systems:    Review of Systems   Constitutional: Negative for appetite change, chills, fever and unexpected weight change  HENT: Positive for congestion, postnasal drip and sinus pressure  Negative for ear pain, sore throat and trouble swallowing  Eyes: Positive for visual disturbance (blurry vision in both eyes earlier today )  Respiratory: Positive for cough (yellow)  Negative for shortness of breath and wheezing  Cardiovascular: Negative for chest pain, palpitations and leg swelling  Gastrointestinal: Positive for nausea  Negative for abdominal pain, blood in stool, constipation, diarrhea and vomiting  Endocrine: Positive for polydipsia and polyuria  Genitourinary: Positive for frequency  Negative for difficulty urinating, dysuria and hematuria  Musculoskeletal: Negative for back pain, joint swelling and myalgias  Skin: Negative for rash  Neurological: Negative for dizziness, seizures, syncope, speech difficulty, light-headedness and headaches  Psychiatric/Behavioral: Negative for suicidal ideas  The patient is not nervous/anxious  Past Medical and Surgical History:     Past Medical History:   Diagnosis Date    Absent pulse     DP pulse B/L    Depression     Diabetes (HCC)     HLD (hyperlipidemia)     Neuropathy     Osteopenia     Prominence of large joints     (L) SC    Weight loss        Past Surgical History:   Procedure Laterality Date    EYE SURGERY  2018    FINGER SURGERY         Meds/Allergies:    Prior to Admission medications    Medication Sig Start Date End Date Taking?  Authorizing Provider   cholecalciferol (VITAMIN D3) 1,000 units tablet Take 1,000 Units by mouth daily    Historical Provider, MD clotrimazole-betamethasone (LOTRISONE) 1-0 05 % cream Apply topically 2 (two) times a day 12/13/18   Army Sham, DO   gabapentin (NEURONTIN) 300 mg capsule gabapentin 300 mg capsule    Historical Provider, MD   terconazole (TERAZOL 7) 0 4 % vaginal cream Insert 1 applicator into the vagina daily at bedtime 12/13/18   Army Sham, DO   TRESIBA FLEXTOUCH 100 units/mL injection pen INJECT 32 UNITS AT BEDTIME 10/14/18   Historical Provider, MD     I have reviewed home medications with patient personally  Allergies: No Known Allergies    Social History:     Marital Status: /Civil Union   Occupation:  at [employer]    Patient Pre-hospital Living Situation: lives with daughter and    Patient Pre-hospital Level of Mobility: none   Patient Pre-hospital Diet Restrictions: none   Substance Use History:   Social History     Substance and Sexual Activity   Alcohol Use No     Social History     Tobacco Use   Smoking Status Current Every Day Smoker    Packs/day: 0 50    Years: 25 00    Pack years: 12 50    Types: Cigarettes   Smokeless Tobacco Never Used     Social History     Substance and Sexual Activity   Drug Use No       Family History:    non-contributory    Physical Exam:     Vitals:   Blood Pressure: 166/74 (08/19/19 1530)  Pulse: 80 (08/19/19 1530)  Temperature: 98 3 °F (36 8 °C) (08/19/19 1333)  Temp Source: Oral (08/19/19 1333)  Respirations: 16 (08/19/19 1530)  Weight - Scale: 58 2 kg (128 lb 4 9 oz) (08/19/19 1316)  SpO2: 95 % (08/19/19 1530)    Physical Exam   Constitutional: She is oriented to person, place, and time  She appears well-developed  She is cooperative  No distress  HENT:   Head: Normocephalic and atraumatic  Mouth/Throat: Oropharynx is clear and moist  No oropharyngeal exudate  Eyes: Pupils are equal, round, and reactive to light  Conjunctivae and EOM are normal  Right eye exhibits no discharge  No scleral icterus  Neck: Neck supple  No JVD present  Carotid bruit is not present  Cardiovascular: Normal rate, regular rhythm, S1 normal, S2 normal, normal heart sounds and intact distal pulses  Exam reveals no gallop and no friction rub  No murmur heard  Pulmonary/Chest: Effort normal  No respiratory distress  She has wheezes  She has no rhonchi  She has no rales  Abdominal: Soft  Bowel sounds are normal  She exhibits no distension  There is no tenderness  There is no guarding  Musculoskeletal: She exhibits no edema or tenderness  Neurological: She is alert and oriented to person, place, and time  Skin: Skin is warm and dry  No rash noted  She is not diaphoretic  No erythema  Psychiatric: She has a normal mood and affect  Her behavior is normal  Her mood appears not anxious  She does not exhibit a depressed mood  Additional Data:     Lab Results: I have personally reviewed pertinent reports  Results from last 7 days   Lab Units 08/19/19  1407   WBC Thousand/uL 12 41*   HEMOGLOBIN g/dL 13 6   HEMATOCRIT % 39 6   PLATELETS Thousands/uL 340   NEUTROS PCT % 79*   LYMPHS PCT % 14   MONOS PCT % 4   EOS PCT % 1     Results from last 7 days   Lab Units 08/19/19  1407   SODIUM mmol/L 126*   POTASSIUM mmol/L 4 8   CHLORIDE mmol/L 91*   CO2 mmol/L 29   BUN mg/dL 20   CREATININE mg/dL 1 50*   ANION GAP mmol/L 6   CALCIUM mg/dL 9 3   GLUCOSE RANDOM mg/dL 513*                       Imaging: I have personally reviewed pertinent reports  XR chest 2 views   ED Interpretation by Yokasta Lan DO (08/19 1447)   No acute cardiopulmonary processes      Final Result by Peggy Hernandez MD (08/19 1456)      No acute cardiopulmonary disease  Workstation performed: [id number]             EKG, Pathology, and Other Studies Reviewed on Admission:   · EKG: NSR     Allscripts / Epic Records Reviewed: Yes     ** Please Note: This note has been constructed using a voice recognition system   **

## 2019-08-19 NOTE — ASSESSMENT & PLAN NOTE
· Likely prerenal- suspect dehydration as a possible cause  · S/p 1L of NSS in the ED  · C/w normal saline at 75ml/hr   · Repeat am bmp   · Avoid nephrotoxic drugs and hypotension  · Check PVR x1 to r/o retention  · Check ua in the setting of urinary frequency- although this can be secondray to hyperglycemia

## 2019-08-19 NOTE — ASSESSMENT & PLAN NOTE
· Now resolved- initially presenting symptom  · R/o cardiac related- TYLER score of 1  · Trop x1 negative   · ekg NSR   · Follow on telemetry

## 2019-08-19 NOTE — ASSESSMENT & PLAN NOTE
· Sodium corrected in setting of hyperglycemia is 133  · Trial ivfs and monitor  · Check urine osmo, urine na and serum osmo  · Check tsh   · Repeat am bmp

## 2019-08-19 NOTE — ASSESSMENT & PLAN NOTE
· Dx last week at urgent care and started on PO azithro   · Chest xray without acute abnormality detected  · Add respiratory protocol/nebs  · Pt has one more day of azithro that she will need to take for tomorrow and then d/c abxs   · Add mucinex

## 2019-08-19 NOTE — ED PROVIDER NOTES
History  Chief Complaint   Patient presents with    Hyperglycemia - Symptomatic     pt presented to wellness center today for dizziness, found to have BG of 393 and + ketones in urine  pt states she took her medications  pt with recent URI x5 days and taking abx     The patient is a 59yo F with pmhx diabetes, non-insulin dependent presenting to ED with chief complaint of diaphoresis  She was at rest she suddenly became very "sweaty,"soaking through her clothing, she subsequently went to her wellness center where she had a point of care blood glucose and a urinalysis completed  Found to be hyperglycemic, and reportedly had ketones in her urine and was told to come to ED  Patient states that last week she had an upper respiratory illness and went to her primary care and was diagnosed with bronchitis and started on an antibiotic, unsure which antibiotic it is  She has been taking it daily since it was prescribed, she was not prescribed any steroids  She states she has not been feeling well over the last 3 days, complains of poor appetite and urinary frequency without urgency, dysuria or hematuria  She states she has a dry cough since last week, but no dyspnea, no fever or chills  She denies chest pain, has no prior cardiac history  She does currently smoke, and states her blood glucose has been difficult to control in the past, but has never been >500  Her last a1c was around 9 0 she believes  She states it has been as freda as 12-13 in the past  Patient denies headache, neck pain/stiffness, vision/hearing changes, lightheadedness, dizziness, syncope and near-syncope, chest pain, cough, dyspnea, back pain, nausea/vomiting, diarrhea/constipation, melena, hematochezia, fever/chills, and lower extremity edema           History provided by:  Patient   used: No    Hyperglycemia - Symptomatic   Blood sugar level PTA:  >400  Onset quality:  Sudden  Timing:  Constant  Chronicity:  New  Diabetes status: Controlled with oral medications  Current diabetic therapy:  Victoza  Context: recent illness    Context: not change in medication    Relieved by:  None tried  Ineffective treatments:  None tried  Associated symptoms: diaphoresis, nausea and polyuria    Associated symptoms: no abdominal pain, no altered mental status, no blurred vision, no chest pain, no dehydration, no dizziness, no dysuria, no fever, no increased appetite, no increased thirst, no shortness of breath, no vomiting and no weakness    Risk factors: no hx of DKA, no pancreatic disease and no recent steroid use        Prior to Admission Medications   Prescriptions Last Dose Informant Patient Reported? Taking?    TRESIBA FLEXTOUCH 100 units/mL injection pen 8/18/2019 at Unknown time  Yes Yes   Sig: INJECT 32 UNITS AT BEDTIME   cholecalciferol (VITAMIN D3) 1,000 units tablet Past Week at Unknown time  Yes Yes   Sig: Take 1,000 Units by mouth daily   clotrimazole-betamethasone (LOTRISONE) 1-0 05 % cream Not Taking at Unknown time  No No   Sig: Apply topically 2 (two) times a day   Patient not taking: Reported on 8/19/2019   gabapentin (NEURONTIN) 300 mg capsule 8/19/2019 at Unknown time  Yes Yes   Sig: gabapentin 300 mg capsule   terconazole (TERAZOL 7) 0 4 % vaginal cream Not Taking at Unknown time  No No   Sig: Insert 1 applicator into the vagina daily at bedtime   Patient not taking: Reported on 8/19/2019      Facility-Administered Medications: None       Past Medical History:   Diagnosis Date    Absent pulse     DP pulse B/L    Depression     Diabetes (HCC)     HLD (hyperlipidemia)     Neuropathy     Osteopenia     Prominence of large joints     (L) SC    Weight loss        Past Surgical History:   Procedure Laterality Date    EYE SURGERY  2018    FINGER SURGERY         Family History   Problem Relation Age of Onset    Alzheimer's disease Mother     Heart attack Father     Diabetes Sister      I have reviewed and agree with the history as documented  Social History     Tobacco Use    Smoking status: Current Every Day Smoker     Packs/day: 0 50     Years: 20 00     Pack years: 10 00     Types: Cigarettes    Smokeless tobacco: Never Used   Substance Use Topics    Alcohol use: Not Currently    Drug use: Never        Review of Systems   Constitutional: Positive for diaphoresis  Negative for appetite change, chills and fever  HENT: Negative  Eyes: Negative  Negative for blurred vision, photophobia and visual disturbance  Respiratory: Negative  Negative for cough, chest tightness and shortness of breath  Cardiovascular: Negative  Negative for chest pain and leg swelling  Gastrointestinal: Positive for nausea  Negative for abdominal pain, blood in stool, constipation, diarrhea and vomiting  Endocrine: Positive for polyuria  Negative for polydipsia  Genitourinary: Positive for frequency  Negative for difficulty urinating, dysuria, flank pain and urgency  Musculoskeletal: Negative  Negative for back pain, neck pain and neck stiffness  Skin: Negative  Allergic/Immunologic: Negative  Neurological: Negative  Negative for dizziness, weakness, light-headedness and headaches  Hematological: Negative  Psychiatric/Behavioral: Negative          Physical Exam  ED Triage Vitals   Temperature Pulse Respirations Blood Pressure SpO2   08/19/19 1333 08/19/19 1316 08/19/19 1316 08/19/19 1316 08/19/19 1316   98 3 °F (36 8 °C) 81 18 162/72 93 %      Temp Source Heart Rate Source Patient Position - Orthostatic VS BP Location FiO2 (%)   08/19/19 1333 08/19/19 1316 08/19/19 1316 08/19/19 1316 --   Oral Monitor Lying Right arm       Pain Score       08/19/19 1316       No Pain             Orthostatic Vital Signs  Vitals:    08/19/19 1630 08/19/19 1802 08/20/19 0312 08/20/19 0715   BP:  (!) 175/80 138/81 144/82   Pulse: 76 75 75 80   Patient Position - Orthostatic VS:    Sitting       Physical Exam   Constitutional: She is oriented to person, place, and time  She appears well-developed and well-nourished  HENT:   Head: Normocephalic and atraumatic  Right Ear: External ear normal    Left Ear: External ear normal    Nose: Nose normal    Dry mucus membranes   Eyes: Pupils are equal, round, and reactive to light  Conjunctivae and EOM are normal  No scleral icterus  Neck: Normal range of motion  No JVD present  No tracheal deviation present  Cardiovascular: Normal rate, regular rhythm, normal heart sounds and intact distal pulses  No murmur heard  Pulmonary/Chest: Effort normal  No respiratory distress  She has wheezes  Diffuse inspiratory wheezing    Abdominal: Soft  Bowel sounds are normal  She exhibits no distension  There is no tenderness  There is no guarding  Musculoskeletal: Normal range of motion  She exhibits no edema  Neurological: She is alert and oriented to person, place, and time  She exhibits normal muscle tone  Patient is alert and oriented to person, place, time, and situation  Speech is normal with no dysarthria, no aphasia  Cranial nerves II-XII intact  Sensation is intact bilaterally upper and lower extremities  Normal muscle tone, no clonus, no atrophy  5/5 muscle strength bilaterally upper extremities, 5/5 muscle strength bilaterally lower extremities  Finger-to-nose, heel-to-shin testing normal bilaterally  No pronator drift  Skin: Skin is warm and dry  Capillary refill takes less than 2 seconds  She is not diaphoretic  Psychiatric: She has a normal mood and affect  Her behavior is normal    Vitals reviewed        ED Medications  Medications   gabapentin (NEURONTIN) capsule 300 mg (300 mg Oral Given 8/20/19 0810)   cholecalciferol (VITAMIN D3) tablet 1,000 Units (1,000 Units Oral Given 8/20/19 0810)   sodium chloride 0 9 % infusion (100 mL/hr Intravenous New Bag 8/20/19 2322)   docusate sodium (COLACE) capsule 100 mg (100 mg Oral Given 8/20/19 0810)   ondansetron (ZOFRAN) injection 4 mg (has no administration in time range)   nicotine (NICODERM CQ) 14 mg/24hr TD 24 hr patch 1 patch (1 patch Transdermal Not Given 8/20/19 0812)   heparin (porcine) subcutaneous injection 5,000 Units (5,000 Units Subcutaneous Given 8/20/19 0548)   acetaminophen (TYLENOL) tablet 650 mg (has no administration in time range)   hydrALAZINE (APRESOLINE) injection 5 mg (has no administration in time range)   atorvastatin (LIPITOR) tablet 80 mg (80 mg Oral Given 8/19/19 1835)   insulin regular (HumuLIN R,NovoLIN R) 1 Units/mL in sodium chloride 0 9 % 100 mL infusion (8 Units/hr Intravenous Rate/Dose Change 8/20/19 1208)   levalbuterol (XOPENEX) inhalation solution 1 25 mg (1 25 mg Nebulization Given 8/20/19 0834)   sodium chloride 0 9 % inhalation solution 3 mL (3 mL Nebulization Given 8/20/19 0834)   sodium chloride 0 9 % bolus 1,000 mL (0 mL Intravenous Stopped 8/19/19 1601)   azithromycin (ZITHROMAX) tablet 250 mg (250 mg Oral Given 8/20/19 0810)       Diagnostic Studies  Results Reviewed     Procedure Component Value Units Date/Time    Osmolality, urine [[id number]]  (Normal) Collected:  08/20/19 0756    Lab Status:  Final result Specimen:  Urine, Clean Catch Updated:  08/20/19 0851     Osmolality, Ur 277 mmol/KG     Sodium, urine, random [[id number]] Collected:  08/20/19 0756    Lab Status:  Final result Specimen:  Urine, Clean Catch Updated:  08/20/19 0829     Sodium, Ur 68    TSH, 3rd generation [[id number]]  (Normal) Collected:  08/20/19 0510    Lab Status:  Final result Specimen:  Blood from Hand, Right Updated:  08/20/19 0704     TSH 3RD GENERATON 0 781 uIU/mL     Narrative:       Patients undergoing fluorescein dye angiography may retain small amounts of fluorescein in the body for 48-72 hours post procedure  Samples containing fluorescein can produce falsely depressed TSH values  If the patient had this procedure,a specimen should be resubmitted post fluorescein clearance        Basic metabolic panel [[id number]]  (Abnormal) Collected:  08/20/19 0512    Lab Status:  Final result Specimen:  Blood from Hand, Right Updated:  08/20/19 0701     Sodium 136 mmol/L      Potassium 3 9 mmol/L      Chloride 105 mmol/L      CO2 27 mmol/L      ANION GAP 4 mmol/L      BUN 14 mg/dL      Creatinine 0 66 mg/dL      Glucose 251 mg/dL      Glucose, Fasting 251 mg/dL      Calcium 8 9 mg/dL      eGFR 94 ml/min/1 73sq m     Narrative:       [place] guidelines for Chronic Kidney Disease (CKD):     Stage 1 with normal or high GFR (GFR > 90 mL/min/1 73 square meters)    Stage 2 Mild CKD (GFR = 60-89 mL/min/1 73 square meters)    Stage 3A Moderate CKD (GFR = 45-59 mL/min/1 73 square meters)    Stage 3B Moderate CKD (GFR = 30-44 mL/min/1 73 square meters)    Stage 4 Severe CKD (GFR = 15-29 mL/min/1 73 square meters)    Stage 5 End Stage CKD (GFR <15 mL/min/1 73 square meters)  Note: GFR calculation is accurate only with a steady state creatinine    Platelet count [[id number]]  (Normal) Collected:  08/20/19 0512    Lab Status:  Final result Specimen:  Blood from Hand, Right Updated:  08/20/19 0659     Platelets 258 Thousands/uL      MPV 11 0 fL     CBC (With Platelets) [[id number]]  (Normal) Collected:  08/20/19 0511    Lab Status:  Final result Specimen:  Blood from Hand, Right Updated:  08/20/19 0653     WBC 10 16 Thousand/uL      RBC 4 10 Million/uL      Hemoglobin 12 4 g/dL      Hematocrit 37 1 %      MCV 91 fL      MCH 30 2 pg      MCHC 33 4 g/dL      RDW 12 1 %      Platelets 545 Thousands/uL      MPV 11 0 fL     Osmolality-"If this is regarding a toxic alcohol, STOP  Test is not routinely indicated   Please consult medical  on call for further guidance " [[id number]]  (Abnormal) Collected:  08/20/19 0510    Lab Status:  Final result Specimen:  Blood from Hand, Right Updated:  08/20/19 0650     Osmolality Serum 302 mmol/KG     POCT urinalysis dipstick [[id number]]  (Normal) Resulted:  08/19/19 1706    Lab Status: Final result Updated:  08/19/19 1706     Clarity, UA clear    ED Urine Macroscopic [[id number]]  (Abnormal) Collected:  08/19/19 1705    Lab Status:  Final result Specimen:  Urine Updated:  08/19/19 1705     Color, UA Yellow     Clarity, UA Clear     pH, UA 6 0     Leukocytes, UA Negative     Nitrite, UA Negative     Protein, UA Negative mg/dl      Glucose,  (1/2%) mg/dl      Ketones, UA Negative mg/dl      Urobilinogen, UA 0 2 E U /dl      Bilirubin, UA Negative     Blood, UA Negative     Specific Gravity, UA 1 010    Narrative:       CLINITEK RESULT    UA w Reflex to Microscopic w Reflex to Culture [[id number]]     Lab Status:  No result Specimen:  Urine     Troponin I [[id number]]  (Normal) Collected:  08/19/19 1407    Lab Status:  Final result Specimen:  Blood from Arm, Left Updated:  08/19/19 1438     Troponin I <0 02 ng/mL     Basic metabolic panel [[id number]]  (Abnormal) Collected:  08/19/19 1407    Lab Status:  Final result Specimen:  Blood from Arm, Left Updated:  08/19/19 1435     Sodium 126 mmol/L      Potassium 4 8 mmol/L      Chloride 91 mmol/L      CO2 29 mmol/L      ANION GAP 6 mmol/L      BUN 20 mg/dL      Creatinine 1 50 mg/dL      Glucose 513 mg/dL      Calcium 9 3 mg/dL      eGFR 37 ml/min/1 73sq m     Narrative:       [place] guidelines for Chronic Kidney Disease (CKD):     Stage 1 with normal or high GFR (GFR > 90 mL/min/1 73 square meters)    Stage 2 Mild CKD (GFR = 60-89 mL/min/1 73 square meters)    Stage 3A Moderate CKD (GFR = 45-59 mL/min/1 73 square meters)    Stage 3B Moderate CKD (GFR = 30-44 mL/min/1 73 square meters)    Stage 4 Severe CKD (GFR = 15-29 mL/min/1 73 square meters)    Stage 5 End Stage CKD (GFR <15 mL/min/1 73 square meters)  Note: GFR calculation is accurate only with a steady state creatinine    CBC and differential [[id number]]  (Abnormal) Collected:  08/19/19 1407    Lab Status:  Final result Specimen:  Blood from Arm, Left Updated: 08/19/19 1422     WBC 12 41 Thousand/uL      RBC 4 50 Million/uL      Hemoglobin 13 6 g/dL      Hematocrit 39 6 %      MCV 88 fL      MCH 30 2 pg      MCHC 34 3 g/dL      RDW 11 9 %      MPV 10 4 fL      Platelets 029 Thousands/uL      nRBC 0 /100 WBCs      Neutrophils Relative 79 %      Immat GRANS % 1 %      Lymphocytes Relative 14 %      Monocytes Relative 4 %      Eosinophils Relative 1 %      Basophils Relative 1 %      Neutrophils Absolute 9 91 Thousands/µL      Immature Grans Absolute 0 09 Thousand/uL      Lymphocytes Absolute 1 76 Thousands/µL      Monocytes Absolute 0 52 Thousand/µL      Eosinophils Absolute 0 07 Thousand/µL      Basophils Absolute 0 06 Thousands/µL                  XR chest 2 views   ED Interpretation by Liya Villarreal DO (08/19 1447)   No acute cardiopulmonary processes      Final Result by Awa Mendez MD (08/19 1456)      No acute cardiopulmonary disease              Workstation performed: [id number]               Procedures  ECG 12 Lead Documentation Only  Date/Time: 8/20/2019 1:12 PM  Performed by: Liya Villarreal DO  Authorized by: Liya Villarreal DO     ECG reviewed by me, the ED Provider: yes    Patient location:  ED  Previous ECG:     Previous ECG:  Unavailable  Interpretation:     Interpretation: non-specific    Rate:     ECG rate assessment: normal    Rhythm:     Rhythm: sinus rhythm    Ectopy:     Ectopy: none    QRS:     QRS axis:  Normal    QRS intervals:  Normal  Conduction:     Conduction: normal    ST segments:     ST segments:  Normal  T waves:     T waves: normal              ED Course  ED Course as of Aug 20 1315   Mon Aug 19, 2019   1437 Sodium(!): 126         HEART Risk Score      Most Recent Value   History  1 Filed at: 08/19/2019 1506   ECG  0 Filed at: 08/19/2019 1506   Age  1 Filed at: 08/19/2019 1506   Risk Factors  1 Filed at: 08/19/2019 1506   Troponin  0 Filed at: 08/19/2019 1506   Heart Score Risk Calculator   History  1 Filed at: 08/19/2019 1506   ECG  0 Filed at: 08/19/2019 1506   Age  1 Filed at: 08/19/2019 1506   Risk Factors  1 Filed at: 08/19/2019 1506   Troponin  0 Filed at: 08/19/2019 1506   HEART Score  3 Filed at: 08/19/2019 1506   HEART Score  3 Filed at: 08/19/2019 1506                    TYLER Risk Score      Most Recent Value   Age >= 72  0 Filed at: 08/19/2019 1656   Known CAD (stenosis >= 50%)  0 Filed at: 08/19/2019 1656   Recent (<=24 hrs) Service Angina  0 Filed at: 08/19/2019 1656   ST Deviation >= 0 5 mm  0 Filed at: 08/19/2019 1656   3+ CAD Risk Factors (FHx, HTN, HLP, DM, Smoker)  1 Filed at: 08/19/2019 1656   Aspirin Use Past 7 Days  0 Filed at: 08/19/2019 1656   Elevated Cardiac Markers  0 Filed at: 08/19/2019 1656   TYLER Risk Score (Calculated)  1 Filed at: 08/19/2019 1656              MDM  Number of Diagnoses or Management Options  LEON (acute kidney injury) ([facility] [address] ): new and requires workup  Diaphoresis: new and requires workup  Hyperglycemia: new and requires workup  Poorly controlled diabetes mellitus ([facility] [address] ): new and does not require workup  Diagnosis management comments: 1  Given patient reports ketones and hyperglycemia, will evaluate bicarb, electrolytes, and urine  With hx of smoker, poorly-controlled diabetes mellitus, diaphoresis may be anginal equivalent  Cardiac workup  2  CXR no acute findings, EKG NSR no T wave or ST changes noted however no prior for comparison  Initial troponin wnl    3  Bicarb wnl, electrolytes stable  LEON likely prerenal given diuresis 2/2 hyperglycemia     4  Admit to medicine for cardiac workup, monitoring       Amount and/or Complexity of Data Reviewed  Clinical lab tests: ordered and reviewed  Tests in the radiology section of CPT®: ordered and reviewed  Review and summarize past medical records: yes  Independent visualization of images, tracings, or specimens: yes        Disposition  Final diagnoses:   Diaphoresis   Poorly controlled diabetes mellitus ([facility] [address] )   LEON (acute kidney injury) ([facility] [address] )   Hyperglycemia Time reflects when diagnosis was documented in both MDM as applicable and the Disposition within this note     Time User Action Codes Description Comment    8/19/2019  3:22 PM [place], [address]     8/19/2019  3:22 PM [place] Add [E11 65] Poorly controlled diabetes mellitus ([facility] [address] )     8/19/2019  3:49 PM [place] Add [N17 9] LEON (acute kidney injury) ([facility] [address] )     8/19/2019  3:49 PM [place] Add [R73 9] Hyperglycemia       ED Disposition     ED Disposition Condition Date/Time Comment    Admit Stable Mon Aug 19, 2019  3:49 PM Case was discussed with RUCHI and the patient's admission status was agreed to be Admission Status: observation status to the service of Dr Elda Da Silva   Follow-up Information    None         Current Discharge Medication List      CONTINUE these medications which have NOT CHANGED    Details   cholecalciferol (VITAMIN D3) 1,000 units tablet Take 1,000 Units by mouth daily      gabapentin (NEURONTIN) 300 mg capsule gabapentin 300 mg capsule      TRESIBA FLEXTOUCH 100 units/mL injection pen INJECT 32 UNITS AT BEDTIME  Refills: 2      clotrimazole-betamethasone (LOTRISONE) 1-0 05 % cream Apply topically 2 (two) times a day  Qty: 30 g, Refills: 0    Associated Diagnoses: Candidiasis      terconazole (TERAZOL 7) 0 4 % vaginal cream Insert 1 applicator into the vagina daily at bedtime  Qty: 45 g, Refills: 0    Associated Diagnoses: Candidiasis           No discharge procedures on file  ED Provider  Attending physically available and evaluated Cole Vasquez I managed the patient along with the ED Attending      Electronically Signed by         Shante Mendosa DO  08/20/19 7913

## 2019-08-19 NOTE — ASSESSMENT & PLAN NOTE
Lab Results   Component Value Date    HGBA1C 8 6 (H) 08/17/2018       No results for input(s): POCGLU in the last 72 hours      Blood Sugar Average: Last 72 hrs:  · blood sugar on BMP was 513  · Last a1c check on 8/3 was 10 9  · She is currently on xigduo 5-1000 at home, victoza 18mg and tresiba 32 units at HS- will hold for now and start noncritical care insulin gtt while hospitalized   · CCD   · Will rehydrate with normal saline 150ml/hr   · C/w neurontin for neuropathy

## 2019-08-20 PROBLEM — D72.829 LEUKOCYTOSIS: Status: ACTIVE | Noted: 2019-08-20

## 2019-08-20 LAB
ANION GAP SERPL CALCULATED.3IONS-SCNC: 4 MMOL/L (ref 4–13)
BACTERIA UR QL AUTO: ABNORMAL /HPF
BILIRUB UR QL STRIP: NEGATIVE
BUN SERPL-MCNC: 14 MG/DL (ref 5–25)
CALCIUM SERPL-MCNC: 8.9 MG/DL (ref 8.3–10.1)
CHLORIDE SERPL-SCNC: 105 MMOL/L (ref 100–108)
CLARITY UR: CLEAR
CO2 SERPL-SCNC: 27 MMOL/L (ref 21–32)
COLOR UR: YELLOW
CREAT SERPL-MCNC: 0.66 MG/DL (ref 0.6–1.3)
ERYTHROCYTE [DISTWIDTH] IN BLOOD BY AUTOMATED COUNT: 12.1 % (ref 11.6–15.1)
GFR SERPL CREATININE-BSD FRML MDRD: 94 ML/MIN/1.73SQ M
GLUCOSE P FAST SERPL-MCNC: 251 MG/DL (ref 65–99)
GLUCOSE SERPL-MCNC: 139 MG/DL (ref 65–140)
GLUCOSE SERPL-MCNC: 157 MG/DL (ref 65–140)
GLUCOSE SERPL-MCNC: 182 MG/DL (ref 65–140)
GLUCOSE SERPL-MCNC: 184 MG/DL (ref 65–140)
GLUCOSE SERPL-MCNC: 215 MG/DL (ref 65–140)
GLUCOSE SERPL-MCNC: 228 MG/DL (ref 65–140)
GLUCOSE SERPL-MCNC: 231 MG/DL (ref 65–140)
GLUCOSE SERPL-MCNC: 248 MG/DL (ref 65–140)
GLUCOSE SERPL-MCNC: 251 MG/DL (ref 65–140)
GLUCOSE SERPL-MCNC: 272 MG/DL (ref 65–140)
GLUCOSE SERPL-MCNC: 300 MG/DL (ref 65–140)
GLUCOSE SERPL-MCNC: 407 MG/DL (ref 65–140)
GLUCOSE SERPL-MCNC: 46 MG/DL (ref 65–140)
GLUCOSE SERPL-MCNC: 63 MG/DL (ref 65–140)
GLUCOSE SERPL-MCNC: 81 MG/DL (ref 65–140)
GLUCOSE UR STRIP-MCNC: ABNORMAL MG/DL
HCT VFR BLD AUTO: 37.1 % (ref 34.8–46.1)
HGB BLD-MCNC: 12.4 G/DL (ref 11.5–15.4)
HGB UR QL STRIP.AUTO: NEGATIVE
HYALINE CASTS #/AREA URNS LPF: ABNORMAL /LPF
KETONES UR STRIP-MCNC: NEGATIVE MG/DL
LEUKOCYTE ESTERASE UR QL STRIP: ABNORMAL
MCH RBC QN AUTO: 30.2 PG (ref 26.8–34.3)
MCHC RBC AUTO-ENTMCNC: 33.4 G/DL (ref 31.4–37.4)
MCV RBC AUTO: 91 FL (ref 82–98)
NITRITE UR QL STRIP: NEGATIVE
NON-SQ EPI CELLS URNS QL MICRO: ABNORMAL /HPF
OSMOLALITY UR/SERPL-RTO: 302 MMOL/KG (ref 282–298)
OSMOLALITY UR: 277 MMOL/KG
PH UR STRIP.AUTO: 5.5 [PH]
PLATELET # BLD AUTO: 300 THOUSANDS/UL (ref 149–390)
PLATELET # BLD AUTO: 313 THOUSANDS/UL (ref 149–390)
PMV BLD AUTO: 11 FL (ref 8.9–12.7)
PMV BLD AUTO: 11 FL (ref 8.9–12.7)
POTASSIUM SERPL-SCNC: 3.9 MMOL/L (ref 3.5–5.3)
PROT UR STRIP-MCNC: NEGATIVE MG/DL
RBC # BLD AUTO: 4.1 MILLION/UL (ref 3.81–5.12)
RBC #/AREA URNS AUTO: ABNORMAL /HPF
SODIUM 24H UR-SCNC: 68 MOL/L
SODIUM SERPL-SCNC: 136 MMOL/L (ref 136–145)
SP GR UR STRIP.AUTO: 1.01 (ref 1–1.03)
TSH SERPL DL<=0.05 MIU/L-ACNC: 0.78 UIU/ML (ref 0.36–3.74)
UROBILINOGEN UR QL STRIP.AUTO: 0.2 E.U./DL
WBC # BLD AUTO: 10.16 THOUSAND/UL (ref 4.31–10.16)
WBC #/AREA URNS AUTO: ABNORMAL /HPF

## 2019-08-20 PROCEDURE — 85027 COMPLETE CBC AUTOMATED: CPT | Performed by: NURSE PRACTITIONER

## 2019-08-20 PROCEDURE — 99226 PR SBSQ OBSERVATION CARE/DAY 35 MINUTES: CPT | Performed by: INTERNAL MEDICINE

## 2019-08-20 PROCEDURE — 80048 BASIC METABOLIC PNL TOTAL CA: CPT | Performed by: NURSE PRACTITIONER

## 2019-08-20 PROCEDURE — 83930 ASSAY OF BLOOD OSMOLALITY: CPT | Performed by: NURSE PRACTITIONER

## 2019-08-20 PROCEDURE — 82948 REAGENT STRIP/BLOOD GLUCOSE: CPT

## 2019-08-20 PROCEDURE — 94640 AIRWAY INHALATION TREATMENT: CPT

## 2019-08-20 PROCEDURE — 87086 URINE CULTURE/COLONY COUNT: CPT | Performed by: NURSE PRACTITIONER

## 2019-08-20 PROCEDURE — 84300 ASSAY OF URINE SODIUM: CPT | Performed by: NURSE PRACTITIONER

## 2019-08-20 PROCEDURE — 84443 ASSAY THYROID STIM HORMONE: CPT | Performed by: NURSE PRACTITIONER

## 2019-08-20 PROCEDURE — 83935 ASSAY OF URINE OSMOLALITY: CPT | Performed by: NURSE PRACTITIONER

## 2019-08-20 PROCEDURE — 99245 OFF/OP CONSLTJ NEW/EST HI 55: CPT | Performed by: INTERNAL MEDICINE

## 2019-08-20 PROCEDURE — 81001 URINALYSIS AUTO W/SCOPE: CPT | Performed by: NURSE PRACTITIONER

## 2019-08-20 PROCEDURE — 94760 N-INVAS EAR/PLS OXIMETRY 1: CPT

## 2019-08-20 PROCEDURE — 85049 AUTOMATED PLATELET COUNT: CPT | Performed by: NURSE PRACTITIONER

## 2019-08-20 RX ADMIN — HEPARIN SODIUM 5000 UNITS: 5000 INJECTION INTRAVENOUS; SUBCUTANEOUS at 05:48

## 2019-08-20 RX ADMIN — LEVALBUTEROL 1.25 MG: 1.25 SOLUTION, CONCENTRATE RESPIRATORY (INHALATION) at 08:34

## 2019-08-20 RX ADMIN — ISODIUM CHLORIDE 3 ML: 0.03 SOLUTION RESPIRATORY (INHALATION) at 13:57

## 2019-08-20 RX ADMIN — SODIUM CHLORIDE 6 UNITS/HR: 9 INJECTION, SOLUTION INTRAVENOUS at 15:20

## 2019-08-20 RX ADMIN — DOCUSATE SODIUM 100 MG: 100 CAPSULE, LIQUID FILLED ORAL at 08:10

## 2019-08-20 RX ADMIN — ISODIUM CHLORIDE 3 ML: 0.03 SOLUTION RESPIRATORY (INHALATION) at 20:22

## 2019-08-20 RX ADMIN — SODIUM CHLORIDE 100 ML/HR: 0.9 INJECTION, SOLUTION INTRAVENOUS at 22:29

## 2019-08-20 RX ADMIN — GABAPENTIN 300 MG: 300 CAPSULE ORAL at 22:25

## 2019-08-20 RX ADMIN — HEPARIN SODIUM 5000 UNITS: 5000 INJECTION INTRAVENOUS; SUBCUTANEOUS at 22:25

## 2019-08-20 RX ADMIN — HEPARIN SODIUM 5000 UNITS: 5000 INJECTION INTRAVENOUS; SUBCUTANEOUS at 14:09

## 2019-08-20 RX ADMIN — LEVALBUTEROL 1.25 MG: 1.25 SOLUTION, CONCENTRATE RESPIRATORY (INHALATION) at 13:57

## 2019-08-20 RX ADMIN — AZITHROMYCIN 250 MG: 250 TABLET, FILM COATED ORAL at 08:10

## 2019-08-20 RX ADMIN — LEVALBUTEROL 1.25 MG: 1.25 SOLUTION, CONCENTRATE RESPIRATORY (INHALATION) at 20:22

## 2019-08-20 RX ADMIN — ATORVASTATIN CALCIUM 80 MG: 80 TABLET, FILM COATED ORAL at 15:58

## 2019-08-20 RX ADMIN — SODIUM CHLORIDE 100 ML/HR: 0.9 INJECTION, SOLUTION INTRAVENOUS at 14:15

## 2019-08-20 RX ADMIN — GABAPENTIN 300 MG: 300 CAPSULE ORAL at 15:58

## 2019-08-20 RX ADMIN — SODIUM CHLORIDE 100 ML/HR: 0.9 INJECTION, SOLUTION INTRAVENOUS at 03:42

## 2019-08-20 RX ADMIN — ISODIUM CHLORIDE 3 ML: 0.03 SOLUTION RESPIRATORY (INHALATION) at 08:34

## 2019-08-20 RX ADMIN — GABAPENTIN 300 MG: 300 CAPSULE ORAL at 08:10

## 2019-08-20 RX ADMIN — VITAMIN D, TAB 1000IU (100/BT) 1000 UNITS: 25 TAB at 08:10

## 2019-08-20 NOTE — PROGRESS NOTES
[facility] - Internal Medicine Progress Note       PATIENT INFORMATION      Patient: Imelda Navas 61 y o  female   MRN: [id number]  PCP: No primary care provider on file  Unit/Bed#: [facility] 907-01 Encounter: [id number]  Date Of Visit: 08/20/19       ASSESSMENTS & PLAN       * Uncontrolled insulin-dependent diabetes mellitus with hyperglycemia  Assessment & Plan  · HbA1c of 8 6 - presented with an uncontrolled blood sugar of 513 status post initiation of an insulin drip - blood sugars have been waxing/waning since with a peak of 407 today improved to 182 this evening  · Thoroughly counseled on dietary restrictions/modifications - will appreciate nutritionist evaluation  · Endocrinology evaluation noted - hopeful transition to basal regimen with SSI coverage tomorrow    Hyponatremia  Assessment & Plan   Likely pseudohyponatremia in the setting of severe hyperglycemia - serum sodium normalized today after initiation of an insulin drip coupled with aggressive IV fluid hydration - continue to monitor    Acute kidney injury  Assessment & Plan   Creatinine improved from 1 5 on admission to 0 66 today status post IV fluid hydration   Monitor renal function and urine output - limit/avoid nephrotoxins as possible   Likely failure with dehydration in the setting of hyperglycemia    Tobacco abuse  Assessment & Plan   Transdermal nicotine patch on board   Smoking cessation counseling    Leukocytosis  Assessment & Plan   Likely reactive due to acute medical issues - WBC count normalized today   Remains afebrile      DVT Prophylaxis:  Heparin SC        SUBJECTIVE     Seen/examined earlier this morning  Denies any nausea/vomiting or worsening fatigue at this time  Her blood sugars have been fluctuating and prior to my encounter was still in the 400s  She remains on an insulin drip and is tolerating it thus far    She was counseled on dietary restrictions but open the acknowledges noncompliance to a diabetic diet at home  OBJECTIVE     Vitals:   Temp (24hrs), Av 5 °F (36 9 °C), Min:98 2 °F (36 8 °C), Max:98 8 °F (37 1 °C)    Temp:  [98 2 °F (36 8 °C)-98 8 °F (37 1 °C)] 98 8 °F (37 1 °C)  HR:  [75-80] 80  Resp:  [14-18] 14  BP: (138-144)/(81-82) 144/82  SpO2:  [96 %-100 %] 100 %  Body mass index is 25 74 kg/m²  Input and Output Summary (last 24 hours):        Intake/Output Summary (Last 24 hours) at 2019 1811  Last data filed at 2019 1715  Gross per 24 hour   Intake 2653 77 ml   Output 3600 ml   Net -946 23 ml       Physical Exam:     GENERAL:  Well-developed/nourished - no acute distress  HEAD:  Normocephalic - atraumatic  EYES: PERRL - EOMI   MOUTH:  Mucosa moist  NECK:  Supple - full range of motion  CARDIAC:  Regular rate/rhythm - S1/S2 positive  PULMONARY:  Clear breath sounds bilaterally - nonlabored respirations  ABDOMEN:  Soft - nontender/nondistended - active bowel sounds  MUSCULOSKELETAL:  Motor strength/range of motion intact  NEUROLOGIC:  Alert/oriented x3  SKIN:  Chronic wrinkles/blemishes   PSYCHIATRIC:  Mood/affect age-appropriate      ADDITIONAL DATA       Labs & Recent Cultures:     Results from last 7 days   Lab Units 19  0512 19  0511 19  1407   WBC Thousand/uL  --  10 16 12 41*   HEMOGLOBIN g/dL  --  12 4 13 6   HEMATOCRIT %  --  37 1 39 6   PLATELETS Thousands/uL 313 300 340   NEUTROS PCT %  --   --  79*   LYMPHS PCT %  --   --  14   MONOS PCT %  --   --  4   EOS PCT %  --   --  1     Results from last 7 days   Lab Units 19  0512   SODIUM mmol/L 136   POTASSIUM mmol/L 3 9   CHLORIDE mmol/L 105   CO2 mmol/L 27   BUN mg/dL 14   CREATININE mg/dL 0 66   CALCIUM mg/dL 8 9         Last 24 Hours Medication List:     Current Facility-Administered Medications:  acetaminophen 650 mg Oral Q6H PRN HAROLDO Venegas    atorvastatin 80 mg Oral Daily With Dinner HAROLDO Bolden    cholecalciferol 1,000 Units Oral Daily Keshia Marino HAROLDO Sepulveda    docusate sodium 100 mg Oral BID HAROLDO Grubbs    gabapentin 300 mg Oral TID HAROLDO Grubbs    heparin (porcine) 5,000 Units Subcutaneous [facility] HAROLDO Venegas    hydrALAZINE 5 mg Intravenous Q6H PRN HAROLDO Venegas    insulin regular (HumuLIN R,NovoLIN R) infusion 0 3-21 Units/hr Intravenous Titrated HAROLDO Grubbs Last Rate: 6 Units/hr (08/20/19 1600)   levalbuterol 1 25 mg Nebulization TID Priscila Humphreys, DO    nicotine 1 patch Transdermal Daily HAROLDO Venegas    ondansetron 4 mg Intravenous Q6H PRN HAROLDO Venegas    sodium chloride 100 mL/hr Intravenous Continuous HAROLDO Venegas Last Rate: 100 mL/hr (08/20/19 1415)   sodium chloride 3 mL Nebulization TID Priscila Humphreys, DO           Time Spent for Care:  32 minutes  More than 50% of total time spent on counseling and coordination of care as described above  Current Length of Stay: 0 day(s)      Code Status: Level 1 - Full Code        ** Please Note: This note is constructed using a voice recognition dictation system  An occasional wrong word/phrase or sound-a-like substitution may have been picked up by dictation device due to the inherent limitations of voice recognition software  Read the chart carefully and recognize, using reasonable context, where substitutions may have occurred  **

## 2019-08-20 NOTE — PLAN OF CARE
Problem: METABOLIC, FLUID AND ELECTROLYTES - ADULT  Goal: Fluid balance maintained  Description  INTERVENTIONS:  - Monitor labs   - Monitor I/O and WT  - Instruct patient on fluid and nutrition as appropriate  - Assess for signs & symptoms of volume excess or deficit  Outcome: Progressing  Goal: Glucose maintained within target range  Description  INTERVENTIONS:  - Monitor Blood Glucose as ordered  - Assess for signs and symptoms of hyperglycemia and hypoglycemia  - Administer ordered medications to maintain glucose within target range  - Assess nutritional intake and initiate nutrition service referral as needed  Outcome: Progressing     Problem: HEMATOLOGIC - ADULT  Goal: Maintains hematologic stability  Description  INTERVENTIONS  - Assess for signs and symptoms of bleeding or hemorrhage  - Monitor labs  - Administer supportive blood products/factors as ordered and appropriate  Outcome: Progressing     Problem: MUSCULOSKELETAL - ADULT  Goal: Maintain or return mobility to safest level of function  Description  INTERVENTIONS:  - Assess patient's ability to carry out ADLs; assess patient's baseline for ADL function and identify physical deficits which impact ability to perform ADLs (bathing, care of mouth/teeth, toileting, grooming, dressing, etc )  - Assess/evaluate cause of self-care deficits   - Assess range of motion  - Assess patient's mobility  - Assess patient's need for assistive devices and provide as appropriate  - Encourage maximum independence but intervene and supervise when necessary  - Involve family in performance of ADLs  - Assess for home care needs following discharge   - Consider OT consult to assist with ADL evaluation and planning for discharge  - Provide patient education as appropriate  Outcome: Progressing  Goal: Maintain proper alignment of affected body part  Description  INTERVENTIONS:  - Support, maintain and protect limb and body alignment  - Provide patient/ family with appropriate education  Outcome: Progressing     Problem: DISCHARGE PLANNING  Goal: Discharge to home or other facility with appropriate resources  Description  INTERVENTIONS:  - Identify barriers to discharge w/patient and caregiver  - Arrange for needed discharge resources and transportation as appropriate  - Identify discharge learning needs (meds, wound care, etc )  - Arrange for interpretive services to assist at discharge as needed  - Refer to Case Management Department for coordinating discharge planning if the patient needs post-hospital services based on physician/advanced practitioner order or complex needs related to functional status, cognitive ability, or social support system  Outcome: Progressing     Problem: Nutrition/Hydration-ADULT  Goal: Nutrient/Hydration intake appropriate for improving, restoring or maintaining nutritional needs  Description  Monitor and assess patient's nutrition/hydration status for malnutrition  Collaborate with interdisciplinary team and initiate plan and interventions as ordered  Monitor patient's weight and dietary intake as ordered or per policy  Utilize nutrition screening tool and intervene as necessary  Determine patient's food preferences and provide high-protein, high-caloric foods as appropriate       INTERVENTIONS:  - Monitor oral intake, urinary output, labs, and treatment plans  - Assess nutrition and hydration status and recommend course of action  - Evaluate amount of meals eaten  - Assist patient with eating if necessary   - Allow adequate time for meals  - Recommend/ encourage appropriate diets, oral nutritional supplements, and vitamin/mineral supplements  - Order, calculate, and assess calorie counts as needed  - Recommend, monitor, and adjust tube feedings and TPN/PPN based on assessed needs  - Assess need for intravenous fluids  - Provide specific nutrition/hydration education as appropriate  - Include patient/family/caregiver in decisions related to nutrition  Outcome: Progressing

## 2019-08-20 NOTE — SOCIAL WORK
Met with pt and explained CM program/CM role  Resides with spouse and children in a house, 1 [address], bed/bathroom main floor  Independent prior to admission for ADL's/ambulation  She drives  PCP Dr Dee Carson  Has prescription plan, uses [facility] [address], [place]  Denies utilization DME/HHC/IP Rehab  Denies mental health illness, IP or OP psyche care, drug and/or alcohol abuse  No POA/Living Will  Primary contact is  Jelena Marshall, [phone]  Daughter will provide transport home    CM reviewed d/c planning process including the following: identifying help at home, patient preference for d/c planning needs, Discharge Lounge, [facility] Meds to Bed program, availability of treatment team to discuss questions or concerns patient and/or family may have regarding understanding medications and recognizing signs and symptoms once discharged  CM also encouraged patient to follow up with all recommended appointments after discharge  Patient advised of importance for patient and family to participate in managing patients medical well being  Patient/caregiver received discharge checklist  Content reviewed  Patient/caregiver encouraged to participate in discharge plan of care prior to discharge home

## 2019-08-20 NOTE — UTILIZATION REVIEW
Initial Clinical Review    Admission: Date/Time/Statement: 08/19/2019 @ 1550  Orders Placed This Encounter   Procedures    Place in Observation     Standing Status:   Standing     Number of Occurrences:   1     Order Specific Question:   Admitting Physician     Answer:   Johnathan Peres     Order Specific Question:   Level of Care     Answer:   Med Surg [16]     ED Arrival Information     Expected Arrival Acuity Means of Arrival Escorted By Service Admission Type    - 8/19/2019 13:09 Urgent Walk-In Self Hospitalist Urgent    Arrival Complaint    abnormal lab        Chief Complaint   Patient presents with    Hyperglycemia - Symptomatic     pt presented to wellness center today for dizziness, found to have BG of 393 and + ketones in urine  pt states she took her medications  pt with recent URI x5 days and taking abx     Assessment/Plan: 61year old female, presented to the ED from home via car  Admitted as observation due to elevated glucose  At work became diaphoretic, started to sweat through her clothes  She went to the workplace wellness center and they checked her blood sugar and it was in the 300s  They did a UA and there was ketones in her urine and they recommended she come to the ER  When she arrived to the ER she was given 1L of NSS and her bmp revealed a creatinine of 1 5; NA of 126 and glucose of 513  She admits to being compliant with her medications at home and takes tresiba 32 units at  and xugduo 5-1000  The patient denied any chest pain, pressure, palpitations or leg swelling  She does admit to polydipsia, blurry vision at work that resolved when she got to the ER and urinary frequency  Hyponatremia:  Sodium corrected in setting of hyperglycemia is 133  Trial ivfs and monitor  Check urine osmo, urine na and serum osmo  Check tsh  Repeat am bmp  Type 2 diabetes mellitus: blood sugar on [facility] was 513  Last a1c check on 8/3 was 10 9    She is currently on xigduo 5-1000 at home, victoza 18mg and tresiba 32 units at HS- will hold for now and start noncritical care insulin gtt while hospitalized   CCD  Will rehydrate with normal saline 100ml/hr  Consult endocrinology  C/w neurontin for neuropathy  LEON (acute kidney injury) ([facility] [address] ):  Likely prerenal- suspect dehydration as a possible cause  C/w normal saline at 100ml/hr  Avoid nephrotoxic drugs and hypotension  Check PVR x1 to r/o retention  Check ua in the setting of urinary frequency- although this can be secondray to hyperglycemia  Acute bronchitis:  Dx last week at urgent care and started on PO azithro  Chest xray without acute abnormality detected  Add respiratory protocol/nebs  Pt has one more day of azithro that she will need to take for tomorrow and then d/c abxs  Add mucinex  08/20/2019 1818  Uncontrolled insulin-dependent diabetes mellitus with hyperglycemia:  HbA1c of 8 6 - presented with an uncontrolled blood sugar of 513 status post initiation of an insulin drip - blood sugars have been waxing/waning since with a peak of 407 today improved to 182 this evening  Thoroughly counseled on dietary restrictions/modifications - will appreciate nutritionist evaluation  Endocrinology evaluation noted - hopeful transition to basal regimen with SSI coverage tomorrow  Hyponatremia:  Likely pseudohyponatremia in the setting of severe hyperglycemia - serum sodium normalized today after initiation of an insulin drip coupled with aggressive IV fluid hydration - continue to monitor  Acute kidney injury:  Creatinine improved from 1 5 on admission to 0 66 today status post IV fluid hydration  Monitor renal function and urine output - limit/avoid nephrotoxins as possible    Likely failure with dehydration in the setting of hyperglycemia        ED Triage Vitals   Temperature Pulse Respirations Blood Pressure SpO2   08/19/19 1333 08/19/19 1316 08/19/19 1316 08/19/19 1316 08/19/19 1316   98 3 °F (36 8 °C) 81 18 162/72 93 %      Temp Source Heart Rate Source Patient Position - Orthostatic VS BP Location FiO2 (%)   08/19/19 1333 08/19/19 1316 08/19/19 1316 08/19/19 1316 --   Oral Monitor Lying Right arm       Pain Score       08/19/19 1316       No Pain        Wt Readings from Last 1 Encounters:   08/19/19 58 2 kg (128 lb 4 9 oz)     Additional Vital Signs:   Date/Time  Temp  Pulse  Resp  BP  SpO2  O2 Device  Patient Position - Orthostatic VS   08/20/19 0312  98 2 °F (36 8 °C)  75  18  138/81  97 %  --  --   08/19/19 1912  --  --  --  --  98 %  None (Room air)  --   08/19/19 1802  98 4 °F (36 9 °C)  75  20  175/80Abnormal   96 %  None (Room air)  --   08/19/19 1630  --  76  21  --  94 %  --  --   08/19/19 1530  --  80  16  166/74  95 %  --  --   08/19/19 1406  --  83  18  183/88Abnormal   96 %  None (Room air)  Lying   08/19/19 1333  98 3 °F (36 8 °C)  --  --  --  --  --  --   08/19/19 1316  --  81  18  162/72  93 %  None (Room air)  Lying       Pertinent Labs/Diagnostic Test Results:   Procedure Component Value Ref Range Date/Time   Fingerstick Glucose (POCT) [[id number]] (Abnormal) Collected: 08/21/19 0702   Lab Status: Final result Updated: 08/21/19 0703    POC Glucose 153High  65 - 140 mg/dl    Basic metabolic panel [[id number]] Collected: 08/21/19 0532   Lab Status:  In process Specimen: Blood from Arm, Right Updated: 08/21/19 0714   Fingerstick Glucose (POCT) [[id number]] (Normal) Collected: 08/21/19 0513   Lab Status: Final result Updated: 08/21/19 0514    POC Glucose 114 65 - 140 mg/dl    Fingerstick Glucose (POCT) [[id number]] (Normal) Collected: 08/21/19 0310   Lab Status: Final result Updated: 08/21/19 0340    POC Glucose 113 65 - 140 mg/dl    Fingerstick Glucose (POCT) [[id number]] (Abnormal) Collected: 08/21/19 0123   Lab Status: Final result Updated: 08/21/19 0148    POC Glucose 240High  65 - 140 mg/dl    Fingerstick Glucose (POCT) [[id number]] (Abnormal) Collected: 08/20/19 2316   Lab Status: Final result Updated: 08/20/19 2316    POC Glucose 157High 65 - 140 mg/dl    Fingerstick Glucose (POCT) [[id number]] (Abnormal) Collected: 08/20/19 2230   Lab Status: Final result Updated: 08/20/19 2232    POC Glucose 46Low  65 - 140 mg/dl     Comment: CRITICAL VALUE NOTED      Fingerstick Glucose (POCT) [[id number]] (Abnormal) Collected: 08/20/19 2000   Lab Status: Final result Updated: 08/20/19 2003    POC Glucose 215High  65 - 140 mg/dl    Fingerstick Glucose (POCT) [[id number]] (Normal) Collected: 08/20/19 1814   Lab Status: Final result Updated: 08/20/19 2145    POC Glucose 81 65 - 140 mg/dl    Fingerstick Glucose (POCT) [[id number]] (Abnormal) Collected: 08/20/19 1557   Lab Status: Final result Updated: 08/20/19 1600    POC Glucose 182High  65 - 140 mg/dl    Fingerstick Glucose (POCT) [[id number]] (Abnormal) Collected: 08/20/19 1354   Lab Status: Final result Updated: 08/20/19 1356    POC Glucose 248High  65 - 140 mg/dl    Fingerstick Glucose (POCT) [[id number]] (Abnormal) Collected: 08/20/19 1202   Lab Status: Final result Updated: 08/20/19 1205    POC Glucose 300High  65 - 140 mg/dl    Fingerstick Glucose (POCT) [[id number]] (Abnormal) Collected: 08/20/19 1020   Lab Status: Final result Updated: 08/20/19 1144    POC Glucose 407High  65 - 140 mg/dl        Results from last 7 days   Lab Units 08/20/19  0512 08/20/19  0511 08/19/19  1407   WBC Thousand/uL  --  10 16 12 41*   HEMOGLOBIN g/dL  --  12 4 13 6   HEMATOCRIT %  --  37 1 39 6   PLATELETS Thousands/uL 313 300 340   NEUTROS ABS Thousands/µL  --   --  9 91*     Results from last 7 days   Lab Units 08/20/19  0512 08/19/19  1407   SODIUM mmol/L 136 126*   POTASSIUM mmol/L 3 9 4 8   CHLORIDE mmol/L 105 91*   CO2 mmol/L 27 29   ANION GAP mmol/L 4 6   BUN mg/dL 14 20   CREATININE mg/dL 0 66 1 50*   EGFR ml/min/1 73sq m 94 37   CALCIUM mg/dL 8 9 9 3     Results from last 7 days   Lab Units 08/20/19  0600 08/20/19  0357 08/20/19  0311 08/20/19  0100 08/20/19  0023 08/19/19  2058 08/19/19  1824   POC GLUCOSE mg/dl 231* 184* 63* 228* 272* 190* 252*     Results from last 7 days   Lab Units 19  0512 19  1407   GLUCOSE RANDOM mg/dL 251* 513*     Results from last 7 days   Lab Units 19  0510   OSMOLALITY, SERUM mmol/*     Results from last 7 days   Lab Units 19  1407   TROPONIN I ng/mL <0 02     Results from last 7 days   Lab Units 19  0510   TSH 3RD GENERATON uIU/mL 0 781     Results from last 7 days   Lab Units 19  1706 19  1705   CLARITY UA  clear Clear   COLOR UA   --  Yellow   SPEC GRAV UA   --  1 010   PH UA   --  6 0   GLUCOSE UA mg/dl  --  500 (1/2%)*   KETONES UA mg/dl  --  Negative   BLOOD UA   --  Negative   PROTEIN UA mg/dl  --  Negative   NITRITE UA   --  Negative   BILIRUBIN UA   --  Negative   UROBILINOGEN UA E U /dl  --  0 2   LEUKOCYTES UA   --  Negative     2019 @ 1456 chest X:  No acute cardiopulmonary disease    2019 @ 1528 EC, NSR    ED Treatment:   Medication Administration from 2019 1309 to 2019 1757       Date/Time Order Dose Route Action     2019 1457 sodium chloride 0 9 % bolus 1,000 mL 1,000 mL Intravenous New Bag        Past Medical History:   Diagnosis Date    Absent pulse     DP pulse B/L    Depression     Diabetes (HCC)     HLD (hyperlipidemia)     Neuropathy     Osteopenia     Prominence of large joints     (L) SC    Weight loss      Present on Admission:   Osteopenia   Depression      Admitting Diagnosis: Diaphoresis [R61]  Hyperglycemia [R73 9]  Abnormal laboratory test result [R89 9]  Poorly controlled diabetes mellitus ([facility] [address] ) [E11 65]  [facility] [N17 9]  Age/Sex: 61 y o  female  Admission Orders:  Current Facility-Administered Medications:  acetaminophen 650 mg Oral Q6H PRN   atorvastatin 80 mg Oral Daily With Dinner   azithromycin 250 mg Oral Q24H   cholecalciferol 1,000 Units Oral Daily   docusate sodium 100 mg Oral BID   gabapentin 300 mg Oral TID   heparin (porcine) 5,000 Units Subcutaneous Q8H [address]   hydrALAZINE 5 mg Intravenous Q6H PRN   insulin regular (HumuLIN R,NovoLIN R) infusion 0 3-21 Units/hr Intravenous Titrated   levalbuterol 1 25 mg Nebulization TID   nicotine 1 patch Transdermal Daily   ondansetron 4 mg Intravenous Q6H PRN   sodium chloride 100 mL/hr Intravenous Continuous   sodium chloride 3 mL Nebulization TID   Fingerstick glucose q2h  TELM  Gustavo SCDs  IP CONSULT TO ENDOCRINOLOGY    Network Utilization Review Department  Phone: [phone]; Fax [phone]  [email or web address]  ATTENTION: Please call with any questions or concerns to [phone]  and carefully listen to the prompts so that you are directed to the right person  Send all requests for admission clinical reviews, approved or denied determinations and any other requests to fax [phone]   All voicemails are confidential

## 2019-08-20 NOTE — CONSULTS
Consultation - Lindsay Serrato 61 y o  female MRN: [id number]    Unit/Bed#: [facility] 907-01 Encounter: [id number]      Assessment/Plan     Assessment: This is a 61y o -year-old female with diabetes with hyperglycemia complicated by neuropathy  Plan:  1  Type 2 diabetes with severe hyperglycemia-her diabetes is under very poor control based on her presentation and having blood sugar greater than 500 on admission laboratory testing  At this time, I would continue IV insulin since she her blood sugars still spiking in the 400s  Continue to monitor blood sugar every 2 hours while she is on IV insulin  Likely transition to subcutaneous insulin tomorrow  Educated patient on the relationship of drinking juice and hyperglycemia  She agrees to avoid juice if possible  I suspect the biggest component of her hyperglycemia at this time is dietary on awareness  I think she would benefit from a nutrition consult  Check urine for microalbumin  2  Diabetic neuropathy is stable  CC: Diabetes Consult    History of Present Illness     HPI: Lindsay Serrato is a 61y o  year old female with type 2 diabetes for 20 years  She is on oral agents and insulin at home  She did have polyuria, polydipsia, nocturia and blurry vision  She denies nephropathy, retinopathy, heart attack, stroke and claudication but does admit to neuropathy  She denies any hypoglycemia  Inpatient consult to Endocrinology  Consult performed by: Tierney La MD  Consult ordered by: HAROLDO Arreola          Review of Systems   Constitutional: Negative for chills and fever  Respiratory: Negative for shortness of breath  Cardiovascular: Negative for chest pain  Gastrointestinal: Negative for constipation, diarrhea, nausea and vomiting  Endocrine: Positive for polydipsia and polyuria  Neurological: Positive for numbness  All other systems reviewed and are negative        Historical Information   Past Medical History:   Diagnosis Date    Absent pulse     DP pulse B/L    Depression     Diabetes (HCC)     HLD (hyperlipidemia)     Neuropathy     Osteopenia     Prominence of large joints     (L) SC    Weight loss      Past Surgical History:   Procedure Laterality Date    EYE SURGERY  2018    FINGER SURGERY       Social History   Social History     Substance and Sexual Activity   Alcohol Use Not Currently     Social History     Substance and Sexual Activity   Drug Use Never     Social History     Tobacco Use   Smoking Status Current Every Day Smoker    Packs/day: 0 50    Years: 20 00    Pack years: 10 00    Types: Cigarettes   Smokeless Tobacco Never Used     Family History:   Family History   Problem Relation Age of Onset    Alzheimer's disease Mother     Heart attack Father     Diabetes Sister        Meds/Allergies   Current Facility-Administered Medications   Medication Dose Route Frequency Provider Last Rate Last Dose    acetaminophen (TYLENOL) tablet 650 mg  650 mg Oral Q6H PRN HAROLDO Venegas        atorvastatin (LIPITOR) tablet 80 mg  80 mg Oral Daily With [employer] HAROLDO Vick   80 mg at 08/19/19 1835    cholecalciferol (VITAMIN D3) tablet 1,000 Units  1,000 Units Oral Daily HAROLDO Venegas   1,000 Units at 08/20/19 0810    docusate sodium (COLACE) capsule 100 mg  100 mg Oral BID HAROLDO Venegas   100 mg at 08/20/19 0810    gabapentin (NEURONTIN) capsule 300 mg  300 mg Oral TID HAROLDO Cormier   300 mg at 08/20/19 0810    heparin (porcine) subcutaneous injection 5,000 Units  5,000 Units Subcutaneous [facility] HAROLDO Venegas   5,000 Units at 08/20/19 0548    hydrALAZINE (APRESOLINE) injection 5 mg  5 mg Intravenous Q6H PRN HAROLDO Cormier        insulin regular (HumuLIN R,NovoLIN R) 1 Units/mL in sodium chloride 0 9 % 100 mL infusion  0 3-21 Units/hr Intravenous Titrated HAROLDO Venegas 8 mL/hr at 08/20/19 1208 8 Units/hr at 08/20/19 1208    levalbuterol (XOPENEX) inhalation solution 1 25 mg  1 25 mg Nebulization TID Quenten , DO   1 25 mg at 08/20/19 0834    nicotine (NICODERM CQ) 14 mg/24hr TD 24 hr patch 1 patch  1 patch Transdermal Daily HAROLDO Venegas        ondansetron (ZOFRAN) injection 4 mg  4 mg Intravenous Q6H PRN HAROLDO Murphy        sodium chloride 0 9 % infusion  100 mL/hr Intravenous Continuous HAROLDO Venegas 100 mL/hr at 08/20/19 0342 100 mL/hr at 08/20/19 0342    sodium chloride 0 9 % inhalation solution 3 mL  3 mL Nebulization TID Quenten , DO   3 mL at 08/20/19 0834     No Known Allergies    Objective   Vitals: Blood pressure 144/82, pulse 80, temperature 98 8 °F (37 1 °C), temperature source Oral, resp  rate 14, weight 58 2 kg (128 lb 4 9 oz), SpO2 96 %  Intake/Output Summary (Last 24 hours) at 8/20/2019 1340  Last data filed at 8/20/2019 1300  Gross per 24 hour   Intake 2657 1 ml   Output 2300 ml   Net 357 1 ml     Invasive Devices     Peripheral Intravenous Line            Peripheral IV 08/19/19 Left Antecubital 1 day                Physical Exam   Constitutional: She is oriented to person, place, and time  She appears well-developed and well-nourished  No distress  HENT:   Head: Normocephalic and atraumatic  Mouth/Throat: Oropharynx is clear and moist and mucous membranes are normal  No oropharyngeal exudate  Eyes: Conjunctivae, EOM and lids are normal  Right eye exhibits no discharge  Left eye exhibits no discharge  No scleral icterus  Neck: Neck supple  No thyromegaly present  Cardiovascular: Normal rate, regular rhythm and normal heart sounds  Exam reveals no gallop and no friction rub  No murmur heard  Pulmonary/Chest: Effort normal and breath sounds normal  No respiratory distress  She has no wheezes  Abdominal: Soft  Bowel sounds are normal  She exhibits no distension  There is no tenderness  Musculoskeletal: Normal range of motion  She exhibits no edema, tenderness or deformity     Lymphadenopathy: Head (right side): No occipital adenopathy present  Head (left side): No occipital adenopathy present  Right: No supraclavicular adenopathy present  Left: No supraclavicular adenopathy present  Neurological: She is alert and oriented to person, place, and time  No cranial nerve deficit  Skin: Skin is warm and intact  No rash noted  She is not diaphoretic  No erythema  Psychiatric: She has a normal mood and affect  Her behavior is normal    Vitals reviewed  The history was obtained from the review of the chart, patient and family  Lab Results:       Lab Results   Component Value Date    WBC 10 16 08/20/2019    HGB 12 4 08/20/2019    HCT 37 1 08/20/2019    MCV 91 08/20/2019     08/20/2019     Lab Results   Component Value Date/Time    BUN 14 08/20/2019 05:12 AM    BUN 11 09/02/2017 07:11 AM    K 3 9 08/20/2019 05:12 AM    K 4 6 09/02/2017 07:11 AM     08/20/2019 05:12 AM     09/02/2017 07:11 AM    CO2 27 08/20/2019 05:12 AM    CO2 28 09/02/2017 07:11 AM    CREATININE 0 66 08/20/2019 05:12 AM    AST 12 08/17/2018 07:26 AM    AST 11 09/02/2017 07:11 AM    ALT 16 08/17/2018 07:26 AM    ALT 11 09/02/2017 07:11 AM    ALB 3 5 08/17/2018 07:26 AM    GLOB 3 0 09/02/2017 07:11 AM     No results for input(s): CHOL, HDL, LDL, TRIG, VLDL in the last 72 hours  No results found for: Kat Block  POC Glucose (mg/dl)   Date Value   08/20/2019 300 (H)   08/20/2019 407 (H)   08/20/2019 139   08/20/2019 231 (H)   08/20/2019 184 (H)   08/20/2019 63 (L)   08/20/2019 228 (H)   08/20/2019 272 (H)   08/19/2019 190 (H)   08/19/2019 252 (H)       Imaging Studies: I have personally reviewed pertinent reports  Portions of the record may have been created with voice recognition software

## 2019-08-21 LAB
ANION GAP SERPL CALCULATED.3IONS-SCNC: 4 MMOL/L (ref 4–13)
BACTERIA UR CULT: NORMAL
BUN SERPL-MCNC: 9 MG/DL (ref 5–25)
CALCIUM SERPL-MCNC: 8.3 MG/DL (ref 8.3–10.1)
CHLORIDE SERPL-SCNC: 108 MMOL/L (ref 100–108)
CO2 SERPL-SCNC: 29 MMOL/L (ref 21–32)
CREAT SERPL-MCNC: 0.6 MG/DL (ref 0.6–1.3)
GFR SERPL CREATININE-BSD FRML MDRD: 97 ML/MIN/1.73SQ M
GLUCOSE P FAST SERPL-MCNC: 106 MG/DL (ref 65–99)
GLUCOSE SERPL-MCNC: 106 MG/DL (ref 65–140)
GLUCOSE SERPL-MCNC: 110 MG/DL (ref 65–140)
GLUCOSE SERPL-MCNC: 111 MG/DL (ref 65–140)
GLUCOSE SERPL-MCNC: 113 MG/DL (ref 65–140)
GLUCOSE SERPL-MCNC: 114 MG/DL (ref 65–140)
GLUCOSE SERPL-MCNC: 144 MG/DL (ref 65–140)
GLUCOSE SERPL-MCNC: 153 MG/DL (ref 65–140)
GLUCOSE SERPL-MCNC: 174 MG/DL (ref 65–140)
GLUCOSE SERPL-MCNC: 240 MG/DL (ref 65–140)
GLUCOSE SERPL-MCNC: 255 MG/DL (ref 65–140)
GLUCOSE SERPL-MCNC: 350 MG/DL (ref 65–140)
GLUCOSE SERPL-MCNC: 354 MG/DL (ref 65–140)
GLUCOSE SERPL-MCNC: 83 MG/DL (ref 65–140)
POTASSIUM SERPL-SCNC: 4 MMOL/L (ref 3.5–5.3)
SODIUM SERPL-SCNC: 141 MMOL/L (ref 136–145)

## 2019-08-21 PROCEDURE — 82948 REAGENT STRIP/BLOOD GLUCOSE: CPT

## 2019-08-21 PROCEDURE — 94640 AIRWAY INHALATION TREATMENT: CPT

## 2019-08-21 PROCEDURE — 80048 BASIC METABOLIC PNL TOTAL CA: CPT | Performed by: INTERNAL MEDICINE

## 2019-08-21 PROCEDURE — 94760 N-INVAS EAR/PLS OXIMETRY 1: CPT

## 2019-08-21 PROCEDURE — 99232 SBSQ HOSP IP/OBS MODERATE 35: CPT | Performed by: INTERNAL MEDICINE

## 2019-08-21 RX ORDER — INSULIN GLARGINE 100 [IU]/ML
30 INJECTION, SOLUTION SUBCUTANEOUS
Status: DISCONTINUED | OUTPATIENT
Start: 2019-08-21 | End: 2019-08-22 | Stop reason: HOSPADM

## 2019-08-21 RX ADMIN — INSULIN LISPRO 10 UNITS: 100 INJECTION, SOLUTION INTRAVENOUS; SUBCUTANEOUS at 17:59

## 2019-08-21 RX ADMIN — GABAPENTIN 300 MG: 300 CAPSULE ORAL at 21:03

## 2019-08-21 RX ADMIN — HEPARIN SODIUM 5000 UNITS: 5000 INJECTION INTRAVENOUS; SUBCUTANEOUS at 05:18

## 2019-08-21 RX ADMIN — LEVALBUTEROL 1.25 MG: 1.25 SOLUTION, CONCENTRATE RESPIRATORY (INHALATION) at 14:00

## 2019-08-21 RX ADMIN — GABAPENTIN 300 MG: 300 CAPSULE ORAL at 09:06

## 2019-08-21 RX ADMIN — HEPARIN SODIUM 5000 UNITS: 5000 INJECTION INTRAVENOUS; SUBCUTANEOUS at 15:25

## 2019-08-21 RX ADMIN — GABAPENTIN 300 MG: 300 CAPSULE ORAL at 17:18

## 2019-08-21 RX ADMIN — LEVALBUTEROL 1.25 MG: 1.25 SOLUTION, CONCENTRATE RESPIRATORY (INHALATION) at 07:21

## 2019-08-21 RX ADMIN — ATORVASTATIN CALCIUM 80 MG: 80 TABLET, FILM COATED ORAL at 17:18

## 2019-08-21 RX ADMIN — SODIUM CHLORIDE 100 ML/HR: 0.9 INJECTION, SOLUTION INTRAVENOUS at 19:07

## 2019-08-21 RX ADMIN — INSULIN GLARGINE 30 UNITS: 100 INJECTION, SOLUTION SUBCUTANEOUS at 21:03

## 2019-08-21 RX ADMIN — HEPARIN SODIUM 5000 UNITS: 5000 INJECTION INTRAVENOUS; SUBCUTANEOUS at 21:03

## 2019-08-21 RX ADMIN — VITAMIN D, TAB 1000IU (100/BT) 1000 UNITS: 25 TAB at 09:06

## 2019-08-21 RX ADMIN — ISODIUM CHLORIDE 3 ML: 0.03 SOLUTION RESPIRATORY (INHALATION) at 07:21

## 2019-08-21 RX ADMIN — ISODIUM CHLORIDE 3 ML: 0.03 SOLUTION RESPIRATORY (INHALATION) at 14:00

## 2019-08-21 NOTE — PROGRESS NOTES
[facility]ist Service - Internal Medicine Progress Note       PATIENT INFORMATION      Patient: David Munson 61 y o  female   MRN: [id number]  PCP: No primary care provider on file  [facility] Encounter: [id number]  Date Of Visit: 08/21/19       ASSESSMENTS & PLAN       * Uncontrolled insulin-dependent diabetes mellitus with hyperglycemia  Assessment & Plan  · HbA1c of 8 6 - presented with an uncontrolled blood sugar of 513 status post initiation of an insulin drip - blood sugars have been waxing/waning since with a peak of 354 today - discussed with endocrinology and if blood sugars remain below 250 tomorrow, will transition to a basal regimen with prandial/fixed doses with resumption of home regimen on discharge  · Thoroughly counseled on dietary restrictions/modifications - appreciate nutritionist evaluation  · If stable tomorrow as noted above, hopeful discharge home    Hyponatremia  Assessment & Plan   Likely pseudohyponatremia in the setting of severe hyperglycemia - serum sodium normalized yesterday after initiation of an insulin drip coupled with aggressive IV fluid hydration - continue to monitor     Acute kidney injury  Assessment & Plan   Creatinine improved from 1 5 on admission to 0 6 today status post IV fluid hydration   Monitor renal function and urine output - limit/avoid nephrotoxins as possible   Likely failure with dehydration in the setting of hyperglycemia    Tobacco abuse  Assessment & Plan   Transdermal nicotine patch on board   Smoking cessation counseling    Leukocytosis  Assessment & Plan    Likely reactive due to acute medical issues - WBC count previously normalized   Remains afebrile      DVT Prophylaxis:  Heparin SC        SUBJECTIVE     Seen/examined earlier today  No significant complaints at this time  Understands that her blood sugars are still waxing/waning but generally improved on the insulin drip        OBJECTIVE     Vitals:   Temp (24hrs), Av 6 °F (37 °C), Min:98 6 °F (37 °C), Max:98 6 °F (37 °C)    Temp:  [98 6 °F (37 °C)] 98 6 °F (37 °C)  HR:  [87] 87  Resp:  [16] 16  BP: (147)/(67) 147/67  SpO2:  [98 %-100 %] 99 %  Body mass index is 25 74 kg/m²  Input and Output Summary (last 24 hours):        Intake/Output Summary (Last 24 hours) at 2019 1618  Last data filed at 2019 1412  Gross per 24 hour   Intake 2427 6 ml   Output 4400 ml   Net -1972 4 ml       Physical Exam:     GENERAL:  Well-developed/nourished - no immediate distress  HEAD:  Normocephalic - atraumatic  EYES: PERRL - EOMI   MOUTH:  Mucosa moist  NECK:  Supple - full range of motion  CARDIAC:  Regular rate/rhythm - S1/S2 positive  PULMONARY:  Clear to auscultation bilaterally - nonlabored respirations  ABDOMEN:  Soft - nontender/nondistended - active bowel sounds  MUSCULOSKELETAL:  Motor strength/range of motion intact  NEUROLOGIC:  Alert/oriented x3  SKIN:  Chronic wrinkles/blemishes   PSYCHIATRIC:  Mood/affect stable      ADDITIONAL DATA       Labs & Recent Cultures:     Results from last 7 days   Lab Units 19  0512 19  0511 19  1407   WBC Thousand/uL  --  10 16 12 41*   HEMOGLOBIN g/dL  --  12 4 13 6   HEMATOCRIT %  --  37 1 39 6   PLATELETS Thousands/uL 313 300 340   NEUTROS PCT %  --   --  79*   LYMPHS PCT %  --   --  14   MONOS PCT %  --   --  4   EOS PCT %  --   --  1     Results from last 7 days   Lab Units 19  0532   SODIUM mmol/L 141   POTASSIUM mmol/L 4 0   CHLORIDE mmol/L 108   CO2 mmol/L 29   BUN mg/dL 9   CREATININE mg/dL 0 60   CALCIUM mg/dL 8 3         Last 24 Hours Medication List:     Current Facility-Administered Medications:  acetaminophen 650 mg Oral Q6H PRN HAROLDO Venegas    atorvastatin 80 mg Oral Daily With [employer] HAROLDO Vick    cholecalciferol 1,000 Units Oral Daily HAROLDO Venegas    docusate sodium 100 mg Oral BID HAROLDO Venegas    gabapentin 300 mg Oral TID HAROLDO Arreola heparin (porcine) 5,000 Units Subcutaneous [facility] HAROLDO Venegas    hydrALAZINE 5 mg Intravenous Q6H PRN HAROLDO Venegas    insulin glargine 30 Units Subcutaneous HS Gabrielle Zamora MD    insulin lispro 1-6 Units Subcutaneous HS Gabrielle Zamora MD    insulin lispro 10 Units Subcutaneous TID With [employer] Elina Lincoln MD    [START ON 8/22/2019] insulin lispro 2-12 Units Subcutaneous TID AC Gabrielle Zamora MD    insulin regular (HumuLIN R,NovoLIN R) infusion 0 3-21 Units/hr Intravenous Titrated Elina Lincoln MD Last Rate: 7 Units/hr (08/21/19 1500)   levalbuterol 1 25 mg Nebulization TID Dunia Araiza DO    nicotine 1 patch Transdermal Daily HAROLDO Venegas    ondansetron 4 mg Intravenous Q6H PRN HAROLDO Venegas    sodium chloride 100 mL/hr Intravenous Continuous HAROLDO Venegas Last Rate: 100 mL/hr (08/20/19 2229)   sodium chloride 3 mL Nebulization TID Dunia Araiza DO           Time Spent for Care:  32 minutes  More than 50% of total time spent on counseling and coordination of care as described above  Current Length of Stay: 0 day(s)      Code Status: Level 1 - Full Code        ** Please Note: This note is constructed using a voice recognition dictation system  An occasional wrong word/phrase or sound-a-like substitution may have been picked up by dictation device due to the inherent limitations of voice recognition software  Read the chart carefully and recognize, using reasonable context, where substitutions may have occurred  **

## 2019-08-21 NOTE — PLAN OF CARE
Problem: METABOLIC, FLUID AND ELECTROLYTES - ADULT  Goal: Fluid balance maintained  Description  INTERVENTIONS:  - Monitor labs   - Monitor I/O and WT  - Instruct patient on fluid and nutrition as appropriate  - Assess for signs & symptoms of volume excess or deficit  Outcome: Progressing     Problem: METABOLIC, FLUID AND ELECTROLYTES - ADULT  Goal: Glucose maintained within target range  Description  INTERVENTIONS:  - Monitor Blood Glucose as ordered  - Assess for signs and symptoms of hyperglycemia and hypoglycemia  - Administer ordered medications to maintain glucose within target range  - Assess nutritional intake and initiate nutrition service referral as needed  Outcome: Progressing     Problem: HEMATOLOGIC - ADULT  Goal: Maintains hematologic stability  Description  INTERVENTIONS  - Assess for signs and symptoms of bleeding or hemorrhage  - Monitor labs  - Administer supportive blood products/factors as ordered and appropriate  Outcome: Progressing     Problem: DISCHARGE PLANNING  Goal: Discharge to home or other facility with appropriate resources  Description  INTERVENTIONS:  - Identify barriers to discharge w/patient and caregiver  - Arrange for needed discharge resources and transportation as appropriate  - Identify discharge learning needs (meds, wound care, etc )  - Arrange for interpretive services to assist at discharge as needed  - Refer to Case Management Department for coordinating discharge planning if the patient needs post-hospital services based on physician/advanced practitioner order or complex needs related to functional status, cognitive ability, or social support system  Outcome: Progressing     Problem: Nutrition/Hydration-ADULT  Goal: Nutrient/Hydration intake appropriate for improving, restoring or maintaining nutritional needs  Description  Monitor and assess patient's nutrition/hydration status for malnutrition   Collaborate with interdisciplinary team and initiate plan and interventions as ordered  Monitor patient's weight and dietary intake as ordered or per policy  Utilize nutrition screening tool and intervene as necessary  Determine patient's food preferences and provide high-protein, high-caloric foods as appropriate       INTERVENTIONS:  - Monitor oral intake, urinary output, labs, and treatment plans  - Assess nutrition and hydration status and recommend course of action  - Evaluate amount of meals eaten  - Assist patient with eating if necessary   - Allow adequate time for meals  - Recommend/ encourage appropriate diets, oral nutritional supplements, and vitamin/mineral supplements  - Order, calculate, and assess calorie counts as needed  - Recommend, monitor, and adjust tube feedings and TPN/PPN based on assessed needs  - Assess need for intravenous fluids  - Provide specific nutrition/hydration education as appropriate  - Include patient/family/caregiver in decisions related to nutrition  Outcome: Progressing

## 2019-08-21 NOTE — PLAN OF CARE
Problem: Nutrition/Hydration-ADULT  Goal: Nutrient/Hydration intake appropriate for improving, restoring or maintaining nutritional needs  Description  Monitor and assess patient's nutrition/hydration status for malnutrition  Collaborate with interdisciplinary team and initiate plan and interventions as ordered  Monitor patient's weight and dietary intake as ordered or per policy  Utilize nutrition screening tool and intervene as necessary  Determine patient's food preferences and provide high-protein, high-caloric foods as appropriate       INTERVENTIONS:  - Monitor oral intake, urinary output, labs, and treatment plans  - Assess nutrition and hydration status and recommend course of action  - Evaluate amount of meals eaten  - Assist patient with eating if necessary   - Allow adequate time for meals  - Recommend/ encourage appropriate diets, oral nutritional supplements, and vitamin/mineral supplements  - Order, calculate, and assess calorie counts as needed  - Recommend, monitor, and adjust tube feedings and TPN/PPN based on assessed needs  - Assess need for intravenous fluids  - Provide specific nutrition/hydration education as appropriate  - Include patient/family/caregiver in decisions related to nutrition  Outcome: Progressing  DM diet education daniel

## 2019-08-21 NOTE — SOCIAL WORK
Met with pt to discuss DCP, [facility]  States she will not be home as she will be returning to work full time  Recommended she follow closely with PCP, stated she will    3:40-pt requesting I fill out FMLA paperwork for her  Advised I do not fill out this paperwork, she would have her physician complete  She should request the paperwork from her company and provide to her physician  She states her company told her to have hospital CM complete  Asked me to speak with her dghters    Spoke with pt and dghters and advised I do not complete paperwork for FMLA, I am not a MD, I do not know why her company said I could, but I will not as it is not in my scope to complete    Advised to discuss with MD   Dr Nicol Justice was made aware

## 2019-08-21 NOTE — UTILIZATION REVIEW
Continued Stay Review      Date: 08/21/2019                         Current Patient Class: INPATIENT Current Level of Care: Med/Surg  OBSERVATION 08/19/2019 @ 1702, CONVERTED TO INPATIENT ADMISSION 08/21/2019 @ 1558, DUE TO FURTHER DIAGNOSTIC WORKUP, REQUIRING AT LEAST 2 MIDNIGHT STAY     08/21/19 1558  Inpatient Admission Once     Transfer Service: General Medicine       Question Answer Comment   Admitting Physician Nia DOMINGUEZ    Level of Care Med Surg    Estimated length of stay More than 2 Midnights    Certification I certify that inpatient services are medically necessary for this patient for a duration of greater than two midnights  See H&P and MD Progress Notes for additional information about the patient's course of treatment  HPI:63 y o  female initially admitted on 08/19/2019    Assessment/Plan:  Continue insulin drip - blood sugars have been waxing/waning  She is having hyperglycemia into the 350s after eating  Had an episode of hypoglycemia at 10:30 p m  Last night  08/21/2019  Progress Note Endocrine:  Type 2 diabetes with hyperglycemia-transition to subcutaneous insulin with Lantus 30 units at bedtime and Humalog 10 units with meals along with algorithm for correctional insulin  Stop IV insulin 1 hour after Lantus injection      Pertinent Labs/Diagnostic Results:   Results from last 7 days   Lab Units 08/20/19  0512 08/20/19  0511 08/19/19  1407   WBC Thousand/uL  --  10 16 12 41*   HEMOGLOBIN g/dL  --  12 4 13 6   HEMATOCRIT %  --  37 1 39 6   PLATELETS Thousands/uL 313 300 340   NEUTROS ABS Thousands/µL  --   --  9 91*     Results from last 7 days   Lab Units 08/21/19  0532 08/20/19  0512 08/19/19  1407   SODIUM mmol/L 141 136 126*   POTASSIUM mmol/L 4 0 3 9 4 8   CHLORIDE mmol/L 108 105 91*   CO2 mmol/L 29 27 29   ANION GAP mmol/L 4 4 6   BUN mg/dL 9 14 20   CREATININE mg/dL 0 60 0 66 1 50*   EGFR ml/min/1 73sq m 97 94 37   CALCIUM mg/dL 8 3 8 9 9 3     Results from last 7 days   Lab Units 08/21/19  0702 08/21/19  0513 08/21/19  0310 08/21/19  0123 08/20/19  2316 08/20/19  2230 08/20/19  2000 08/20/19  1814 08/20/19  1557 08/20/19  1354   POC GLUCOSE mg/dl 153* 114 113 240* 157* 46* 215* 81 182* 248*     Results from last 7 days   Lab Units 08/21/19  0532 08/20/19  0512 08/19/19  1407   GLUCOSE RANDOM mg/dL 106 251* 513*     Results from last 7 days   Lab Units 08/20/19  0510   OSMOLALITY, SERUM mmol/*     Results from last 7 days   Lab Units 08/19/19  1407   TROPONIN I ng/mL <0 02     Results from last 7 days   Lab Units 08/20/19  0510   TSH 3RD GENERATON uIU/mL 0 781     Results from last 7 days   Lab Units 08/20/19  0756 08/19/19  1706 08/19/19  1705   CLARITY UA  Clear clear Clear   COLOR UA  Yellow  --  Yellow   SPEC GRAV UA  1 011  --  1 010   PH UA  5 5  --  6 0   GLUCOSE UA mg/dl 100 (1/10%)*  --  500 (1/2%)*   KETONES UA mg/dl Negative  --  Negative   BLOOD UA  Negative  --  Negative   PROTEIN UA mg/dl Negative  --  Negative   NITRITE UA  Negative  --  Negative   BILIRUBIN UA  Negative  --  Negative   UROBILINOGEN UA E U /dl 0 2  --  0 2   LEUKOCYTES UA  Large*  --  Negative   WBC UA /hpf 10-20*  --   --    RBC UA /hpf None Seen  --   --    BACTERIA UA /hpf Occasional  --   --    EPITHELIAL CELLS WET PREP /hpf Occasional  --   --    SODIUM UR  68  --   --      Vital Signs:   Date/Time  Temp  Pulse  Resp  BP  SpO2  O2 Device  Patient Position - Orthostatic VS   08/21/19 0721  --  --  --  --  98 %   --  --   SpO2: ra at 08/21/19 0721 08/20/19 2228  98 6 °F (37 °C)  87  16  147/67  99 %  None (Room air)  Lying         Medications:   Scheduled Meds:   Current Facility-Administered Medications:  acetaminophen 650 mg Oral Q6H PRN   atorvastatin 80 mg Oral Daily With Dinner   cholecalciferol 1,000 Units Oral Daily   docusate sodium 100 mg Oral BID   gabapentin 300 mg Oral TID   heparin (porcine) 5,000 Units Subcutaneous Q8H SHANNON   hydrALAZINE 5 mg Intravenous Q6H PRN insulin regular (HumuLIN R,NovoLIN R) infusion 0 3-21 Units/hr Intravenous Titrated   levalbuterol 1 25 mg Nebulization TID   nicotine 1 patch Transdermal Daily   ondansetron 4 mg Intravenous Q6H PRN   sodium chloride 100 mL/hr Intravenous Continuous   sodium chloride 3 mL Nebulization TID       Discharge Plan: TBD    Network Utilization Review Department  Phone: [phone]; Fax [phone]  [email or web address]  ATTENTION: Please call with any questions or concerns to [phone]  and carefully listen to the prompts so that you are directed to the right person  Send all requests for admission clinical reviews, approved or denied determinations and any other requests to fax [phone]   All voicemails are confidential

## 2019-08-21 NOTE — PLAN OF CARE
Problem: METABOLIC, FLUID AND ELECTROLYTES - ADULT  Goal: Fluid balance maintained  Description  INTERVENTIONS:  - Monitor labs   - Monitor I/O and WT  - Instruct patient on fluid and nutrition as appropriate  - Assess for signs & symptoms of volume excess or deficit  Outcome: Progressing  Goal: Glucose maintained within target range  Description  INTERVENTIONS:  - Monitor Blood Glucose as ordered  - Assess for signs and symptoms of hyperglycemia and hypoglycemia  - Administer ordered medications to maintain glucose within target range  - Assess nutritional intake and initiate nutrition service referral as needed  Outcome: Progressing     Problem: HEMATOLOGIC - ADULT  Goal: Maintains hematologic stability  Description  INTERVENTIONS  - Assess for signs and symptoms of bleeding or hemorrhage  - Monitor labs  - Administer supportive blood products/factors as ordered and appropriate  Outcome: Progressing     Problem: DISCHARGE PLANNING  Goal: Discharge to home or other facility with appropriate resources  Description  INTERVENTIONS:  - Identify barriers to discharge w/patient and caregiver  - Arrange for needed discharge resources and transportation as appropriate  - Identify discharge learning needs (meds, wound care, etc )  - Arrange for interpretive services to assist at discharge as needed  - Refer to Case Management Department for coordinating discharge planning if the patient needs post-hospital services based on physician/advanced practitioner order or complex needs related to functional status, cognitive ability, or social support system  Outcome: Progressing     Problem: Nutrition/Hydration-ADULT  Goal: Nutrient/Hydration intake appropriate for improving, restoring or maintaining nutritional needs  Description  Monitor and assess patient's nutrition/hydration status for malnutrition  Collaborate with interdisciplinary team and initiate plan and interventions as ordered    Monitor patient's weight and dietary intake as ordered or per policy  Utilize nutrition screening tool and intervene as necessary  Determine patient's food preferences and provide high-protein, high-caloric foods as appropriate       INTERVENTIONS:  - Monitor oral intake, urinary output, labs, and treatment plans  - Assess nutrition and hydration status and recommend course of action  - Evaluate amount of meals eaten  - Assist patient with eating if necessary   - Allow adequate time for meals  - Recommend/ encourage appropriate diets, oral nutritional supplements, and vitamin/mineral supplements  - Order, calculate, and assess calorie counts as needed  - Recommend, monitor, and adjust tube feedings and TPN/PPN based on assessed needs  - Assess need for intravenous fluids  - Provide specific nutrition/hydration education as appropriate  - Include patient/family/caregiver in decisions related to nutrition  Outcome: Progressing

## 2019-08-21 NOTE — PROGRESS NOTES
Progress Note - Clay Díaz 61 y o  female MRN: [id number]    Unit/Bed#: [facility] 907-01 Encounter: [id number]      CC: diabetes f/u    Subjective:   Clay Díaz is a 61y o  year old female with type 2 diabetes  Feels well  No complaints  No hypoglycemia  She is having hyperglycemia into the 350s after eating  Had an episode of hypoglycemia at 10:30 p m  Last night  Objective:     Vitals: Blood pressure 147/67, pulse 87, temperature 98 6 °F (37 °C), temperature source Oral, resp  rate 16, height 5' (1 524 m), weight 59 8 kg (131 lb 12 8 oz), SpO2 99 %  ,Body mass index is 25 74 kg/m²  Intake/Output Summary (Last 24 hours) at 8/21/2019 1605  Last data filed at 8/21/2019 1412  Gross per 24 hour   Intake 2427 6 ml   Output 4400 ml   Net -1972 4 ml       Physical Exam:  General Appearance: awake, appears stated age and cooperative  Head: Normocephalic, without obvious abnormality, atraumatic  Extremities: moves all extremities  Skin: Skin color and temperature normal    Pulm: no labored breathing    Lab, Imaging and other studies: I have personally reviewed pertinent reports  POC Glucose (mg/dl)   Date Value   08/21/2019 350 (H)   08/21/2019 110   08/21/2019 144 (H)   08/21/2019 354 (H)   08/21/2019 153 (H)   08/21/2019 114   08/21/2019 113   08/21/2019 240 (H)   08/20/2019 157 (H)   08/20/2019 46 (L)       Assessment:  diabetes with hyperglycemia    Plan:  1  Type 2 diabetes with hyperglycemia-transition to subcutaneous insulin with Lantus 30 units at bedtime and Humalog 10 units with meals along with algorithm for correctional insulin  Stop IV insulin 1 hour after Lantus injection  When she is discharged, she can resume her Gabrielle Haver and Victoza  Upon discharge, if Humalog is not the preferred mealtime insulin for the patient's insurance, we can use NovoLog, Fiasp, Admelog, or Apidra at the same dose instead      If her blood sugar is below 250 mg/dL, it would be reasonable to discharge her home from an endocrine perspective  2  Hypoglycemia should improve with changes in her insulin therapy  Portions of the record may have been created with voice recognition software

## 2019-08-22 VITALS
SYSTOLIC BLOOD PRESSURE: 168 MMHG | TEMPERATURE: 98.2 F | HEIGHT: 60 IN | RESPIRATION RATE: 20 BRPM | OXYGEN SATURATION: 97 % | BODY MASS INDEX: 25.87 KG/M2 | HEART RATE: 69 BPM | WEIGHT: 131.8 LBS | DIASTOLIC BLOOD PRESSURE: 80 MMHG

## 2019-08-22 PROBLEM — N17.9 AKI (ACUTE KIDNEY INJURY) (HCC): Status: RESOLVED | Noted: 2019-08-19 | Resolved: 2019-08-22

## 2019-08-22 PROBLEM — E87.1 HYPONATREMIA: Status: RESOLVED | Noted: 2019-08-19 | Resolved: 2019-08-22

## 2019-08-22 LAB
GLUCOSE SERPL-MCNC: 133 MG/DL (ref 65–140)
GLUCOSE SERPL-MCNC: 213 MG/DL (ref 65–140)

## 2019-08-22 PROCEDURE — 82948 REAGENT STRIP/BLOOD GLUCOSE: CPT

## 2019-08-22 PROCEDURE — 99239 HOSP IP/OBS DSCHRG MGMT >30: CPT | Performed by: INTERNAL MEDICINE

## 2019-08-22 RX ORDER — ATORVASTATIN CALCIUM 80 MG/1
80 TABLET, FILM COATED ORAL
Qty: 30 TABLET | Refills: 0 | Status: SHIPPED | OUTPATIENT
Start: 2019-08-22 | End: 2022-07-19 | Stop reason: ALTCHOICE

## 2019-08-22 RX ORDER — GABAPENTIN 300 MG/1
300 CAPSULE ORAL 3 TIMES DAILY
Qty: 90 CAPSULE | Refills: 0 | Status: SHIPPED | OUTPATIENT
Start: 2019-08-22

## 2019-08-22 RX ADMIN — INSULIN LISPRO 4 UNITS: 100 INJECTION, SOLUTION INTRAVENOUS; SUBCUTANEOUS at 08:29

## 2019-08-22 RX ADMIN — ISODIUM CHLORIDE 3 ML: 0.03 SOLUTION RESPIRATORY (INHALATION) at 08:14

## 2019-08-22 RX ADMIN — DOCUSATE SODIUM 100 MG: 100 CAPSULE, LIQUID FILLED ORAL at 08:32

## 2019-08-22 RX ADMIN — SODIUM CHLORIDE 100 ML/HR: 0.9 INJECTION, SOLUTION INTRAVENOUS at 03:11

## 2019-08-22 RX ADMIN — INSULIN LISPRO 10 UNITS: 100 INJECTION, SOLUTION INTRAVENOUS; SUBCUTANEOUS at 12:28

## 2019-08-22 RX ADMIN — LEVALBUTEROL 1.25 MG: 1.25 SOLUTION, CONCENTRATE RESPIRATORY (INHALATION) at 08:14

## 2019-08-22 RX ADMIN — INSULIN LISPRO 10 UNITS: 100 INJECTION, SOLUTION INTRAVENOUS; SUBCUTANEOUS at 08:30

## 2019-08-22 RX ADMIN — VITAMIN D, TAB 1000IU (100/BT) 1000 UNITS: 25 TAB at 08:32

## 2019-08-22 RX ADMIN — GABAPENTIN 300 MG: 300 CAPSULE ORAL at 08:32

## 2019-08-22 RX ADMIN — HEPARIN SODIUM 5000 UNITS: 5000 INJECTION INTRAVENOUS; SUBCUTANEOUS at 05:33

## 2019-08-22 NOTE — DISCHARGE SUMMARY
Discharge Summary - [facility] - Internal Medicine      Patient Information: Amy Msea 61 y o  female MRN: [id number]  Unit/Bed#: [facility] 907-01 Encounter: [id number]    Discharging Physician / Practitioner: Saintclair Curling, MD  PCP: No primary care provider on file    Admission Date:   Admission Orders (From admission, onward)     Ordered        08/21/19 1557  Inpatient Admission  Once         08/19/19 1550  Place in Observation  Once                   Discharge Date: 08/22/19      Reason for Admission:  Diaphoresis and uncontrolled blood sugars       Discharge Diagnoses:     Principal Problem:    Uncontrolled insulin-dependent diabetes mellitus with hyperglycemia    Active Problems:    Tobacco abuse    Resolved Problems:    Hyponatremia    Acute kidney injury     Leukocytosis      Consultations During Hospital Stay:  · Endocrinology      Hospital Course:     * Uncontrolled insulin-dependent diabetes mellitus with hyperglycemia  Assessment & Plan  · HbA1c of 8 6 - presented with an uncontrolled blood sugar of 513 status post initiation of an insulin drip - blood sugars have been waxing/waning since with a peak of 354 today - discussed with endocrinology and has blood sugars have progressively improved, transitioned to a modified basal regimen with prandial/fixed doses on discharge (prescriptions provided)  · Thoroughly counseled on dietary restrictions/modifications - appreciate nutritionist evaluation  · Plan for discharge home today     Hyponatremia  Assessment & Plan  · Likely pseudohyponatremia in the setting of severe hyperglycemia - serum sodium normalized after initiation of an insulin drip coupled with aggressive IV fluid hydration      Acute kidney injury  Assessment & Plan  · Creatinine improved from 1 5 on admission to 0 6 yesterday status post IV fluid hydration  · Likely due to dehydration in the setting of hyperglycemia on admission     Tobacco abuse  Assessment & Plan  · Transdermal nicotine patch on board during inpatient course  · Smoking cessation counseling     Leukocytosis  Assessment & Plan   · Likely initially reactive due to acute medical issues - WBC count normalized      Condition at Discharge: good       Discharge Day Visit / Exam:     Vitals: Blood Pressure: 168/80 (08/22/19 0644)  Pulse: 69 (08/22/19 0644)  Temperature: 98 2 °F (36 8 °C) (08/22/19 0644)  Temp Source: Oral (08/20/19 2228)  Respirations: 20 (08/22/19 0644)  Height: 5' (152 4 cm) (08/20/19 1701)  Weight - Scale: 59 8 kg (131 lb 12 8 oz) (08/20/19 1701)  SpO2: 97 % (08/22/19 0644)      Physical exam - I had a face-to-face encounter with the patient on day of discharge  Discussion with Patient and/or Family:  The patient has been advised to return to the ER immediately if any symptoms recur or worsen  Discharge instructions/Information to Patient and/or Family:   See after visit summary for information provided to patient and/or family  Provisions for Follow-Up Care:  See after visit summary for information related to follow-up care and any pertinent home health orders  Disposition:   Home      Discharge Medications:  See after visit summary for reconciled discharge medications provided to patient and/or family  Discharge Statement:  I spent 38 minutes discharging the patient  This time was spent on the day of discharge  I had direct contact with the patient on the day of discharge  Greater than 50% of the total time was spent examining patient, answering all patient questions, arranging and discussing plan of care with patient as well as directly providing post-discharge instructions  Additional time then spent on discharge activities  ** Please Note: This note is constructed using a voice recognition dictation system    An occasional wrong word/phrase or sound-a-like substitution may have been picked up by dictation device due to the inherent limitations of voice recognition software  Read the chart carefully and recognize, using reasonable context, where substitutions may have occurred  **

## 2019-08-23 NOTE — ED ATTENDING ATTESTATION
Luis Kwon MD, saw and evaluated the patient  I have discussed the patient with the resident/non-physician practitioner and agree with the resident's/non-physician practitioner's findings, Plan of Care, and MDM as documented in the resident's/non-physician practitioner's note, except where noted  All available labs and Radiology studies were reviewed  I was present for key portions of any procedure(s) performed by the resident/non-physician practitioner and I was immediately available to provide assistance  At this point I agree with the current assessment done in the Emergency Department  I have conducted an independent evaluation of this patient a history and physical is as follows:    29-year-old woman with history of diabetes presents after sudden onset of diaphoresis while at work  Was found to be fairly hyperglycemic at that time  Patient states her blood sugar is usually well under control  She is currently on antibiotics for recent diagnosis of acute bronchitis continues to have cough  She does not have any chest pain or shortness of breath  She is not on a steroids  On exam patient is awake alert no acute distress  Diaphoresis has resolved  Heart regular rate and rhythm, no murmurs rubs or gallops  Lungs clear to auscultation bilaterally  Abdomen soft, nontender, nondistended  Nonfocal neuro  Given diabetic patient with onset of diaphoresis would have to consider cardiac etiology  Patient is also fairly hyperglycemic  Will admit for further evaluation and management        Critical Care Time  Procedures

## 2019-10-21 ENCOUNTER — HOSPITAL ENCOUNTER (OUTPATIENT)
Dept: BONE DENSITY | Facility: MEDICAL CENTER | Age: 64
Discharge: HOME | End: 2019-10-21
Payer: COMMERCIAL

## 2019-10-21 DIAGNOSIS — Z13.820 ENCOUNTER FOR SCREENING FOR OSTEOPOROSIS: ICD-10-CM

## 2019-10-21 DIAGNOSIS — Z78.0 MENOPAUSE: ICD-10-CM

## 2019-10-21 PROCEDURE — 77080 DXA BONE DENSITY AXIAL: CPT

## 2019-10-24 ENCOUNTER — TELEPHONE (OUTPATIENT)
Dept: FAMILY MEDICINE CLINIC | Facility: CLINIC | Age: 64
End: 2019-10-24

## 2019-11-07 ENCOUNTER — HOSPITAL ENCOUNTER (OUTPATIENT)
Dept: MAMMOGRAPHY | Facility: CLINIC | Age: 64
Discharge: HOME | End: 2019-11-07
Payer: COMMERCIAL

## 2019-11-07 VITALS — WEIGHT: 131 LBS | BODY MASS INDEX: 25.72 KG/M2 | HEIGHT: 60 IN

## 2019-11-07 DIAGNOSIS — Z12.31 ENCOUNTER FOR SCREENING MAMMOGRAM FOR MALIGNANT NEOPLASM OF BREAST: ICD-10-CM

## 2019-11-07 PROCEDURE — 77067 SCR MAMMO BI INCL CAD: CPT

## 2019-11-07 PROCEDURE — 77063 BREAST TOMOSYNTHESIS BI: CPT

## 2021-01-13 ENCOUNTER — OFFICE VISIT (OUTPATIENT)
Dept: FAMILY MEDICINE CLINIC | Facility: CLINIC | Age: 66
End: 2021-01-13
Payer: COMMERCIAL

## 2021-01-13 ENCOUNTER — TELEPHONE (OUTPATIENT)
Dept: ADMINISTRATIVE | Facility: OTHER | Age: 66
End: 2021-01-13

## 2021-01-13 VITALS
SYSTOLIC BLOOD PRESSURE: 156 MMHG | TEMPERATURE: 97.3 F | HEIGHT: 60 IN | OXYGEN SATURATION: 99 % | BODY MASS INDEX: 28.47 KG/M2 | DIASTOLIC BLOOD PRESSURE: 70 MMHG | HEART RATE: 85 BPM | WEIGHT: 145 LBS

## 2021-01-13 DIAGNOSIS — E11.42 TYPE 2 DIABETES MELLITUS WITH DIABETIC POLYNEUROPATHY, WITH LONG-TERM CURRENT USE OF INSULIN (HCC): ICD-10-CM

## 2021-01-13 DIAGNOSIS — Z12.31 ENCOUNTER FOR SCREENING MAMMOGRAM FOR MALIGNANT NEOPLASM OF BREAST: ICD-10-CM

## 2021-01-13 DIAGNOSIS — Z23 ENCOUNTER FOR IMMUNIZATION: ICD-10-CM

## 2021-01-13 DIAGNOSIS — R79.89 LOW VITAMIN D LEVEL: ICD-10-CM

## 2021-01-13 DIAGNOSIS — Z79.4 TYPE 2 DIABETES MELLITUS WITH DIABETIC POLYNEUROPATHY, WITH LONG-TERM CURRENT USE OF INSULIN (HCC): ICD-10-CM

## 2021-01-13 DIAGNOSIS — Z12.11 SCREENING FOR COLON CANCER: ICD-10-CM

## 2021-01-13 DIAGNOSIS — I10 ESSENTIAL HYPERTENSION: Primary | ICD-10-CM

## 2021-01-13 DIAGNOSIS — E78.00 PURE HYPERCHOLESTEROLEMIA: ICD-10-CM

## 2021-01-13 DIAGNOSIS — Z72.0 TOBACCO ABUSE: ICD-10-CM

## 2021-01-13 PROCEDURE — 1160F RVW MEDS BY RX/DR IN RCRD: CPT | Performed by: FAMILY MEDICINE

## 2021-01-13 PROCEDURE — 90471 IMMUNIZATION ADMIN: CPT

## 2021-01-13 PROCEDURE — 90732 PPSV23 VACC 2 YRS+ SUBQ/IM: CPT

## 2021-01-13 PROCEDURE — 3008F BODY MASS INDEX DOCD: CPT | Performed by: FAMILY MEDICINE

## 2021-01-13 PROCEDURE — 4010F ACE/ARB THERAPY RXD/TAKEN: CPT | Performed by: FAMILY MEDICINE

## 2021-01-13 PROCEDURE — 3078F DIAST BP <80 MM HG: CPT | Performed by: FAMILY MEDICINE

## 2021-01-13 PROCEDURE — 3725F SCREEN DEPRESSION PERFORMED: CPT | Performed by: FAMILY MEDICINE

## 2021-01-13 PROCEDURE — 99204 OFFICE O/P NEW MOD 45 MIN: CPT | Performed by: FAMILY MEDICINE

## 2021-01-13 PROCEDURE — 4040F PNEUMOC VAC/ADMIN/RCVD: CPT | Performed by: FAMILY MEDICINE

## 2021-01-13 PROCEDURE — 3077F SYST BP >= 140 MM HG: CPT | Performed by: FAMILY MEDICINE

## 2021-01-13 RX ORDER — ACETAMINOPHEN 500 MG
TABLET ORAL EVERY 8 HOURS
COMMUNITY
Start: 2016-06-01

## 2021-01-13 RX ORDER — AMLODIPINE AND OLMESARTAN MEDOXOMIL 5; 40 MG/1; MG/1
TABLET ORAL
COMMUNITY
Start: 2020-09-21 | End: 2021-01-13

## 2021-01-13 RX ORDER — DAPAGLIFLOZIN AND METFORMIN HYDROCHLORIDE 5; 1000 MG/1; MG/1
TABLET, FILM COATED, EXTENDED RELEASE ORAL
COMMUNITY
End: 2021-01-13 | Stop reason: ALTCHOICE

## 2021-01-13 RX ORDER — TRIPROLIDINE/PSEUDOEPHEDRINE 2.5MG-60MG
TABLET ORAL
COMMUNITY

## 2021-01-13 RX ORDER — OLMESARTAN MEDOXOMIL 20 MG/1
20 TABLET ORAL DAILY
Qty: 30 TABLET | Refills: 1 | Status: SHIPPED | OUTPATIENT
Start: 2021-01-13 | End: 2021-02-08

## 2021-01-13 RX ORDER — DULAGLUTIDE 1.5 MG/.5ML
INJECTION, SOLUTION SUBCUTANEOUS
COMMUNITY
Start: 2020-12-23

## 2021-01-13 NOTE — TELEPHONE ENCOUNTER
Upon review of the In Basket request we were able to locate, review, and update the patient chart as requested for Hepatitis C   Any additional questions or concerns should be emailed to the Practice Liaisons via [email or web address] email, please do not reply via In Basket      Thank you  Tosha Sheffield

## 2021-01-13 NOTE — TELEPHONE ENCOUNTER
----- Message from Peggy Aguilar sent at 1/12/2021  2:15 PM EST -----  Regarding: Mammogram  01/12/21 2:15 PM    Hello, our patient Corinne Spearing has had Mammogram completed/performed  Please assist in updating the patient chart by pulling the Care Everywhere (CE) document  The date of service is 12/11/2020       Thank you,  Cole Umanzor, MA   [facility]

## 2021-01-13 NOTE — TELEPHONE ENCOUNTER
----- Message from Peggy Abebe sent at 1/12/2021  2:14 PM EST -----  Regarding: Hep C  01/12/21 2:14 PM    Hello, our patient Melanie Ngo has had Hepatitis C completed/performed  Please assist in updating the patient chart by pulling the Care Everywhere (CE) document  The date of service is 3/22/2019       Thank you,  Pedro Hopper MA   [facility]

## 2021-01-13 NOTE — PROGRESS NOTES
Assessment/Plan:       No problem-specific Assessment & Plan notes found for this encounter  Diagnoses and all orders for this visit:    Essential hypertension  Comments:  Not well controlled  Start Benicar 20 mg  Check BMP in 1 week  To follow with the dash diet  Call if any side effect with medication  Orders:  -     olmesartan (BENICAR) 20 mg tablet; Take 1 tablet (20 mg total) by mouth daily  -     Basic metabolic panel; Future    Type 2 diabetes mellitus with diabetic polyneuropathy, with long-term current use of insulin ([facility] [address] )  Comments:  to see oph  To follow with 1800 calorie diet  Follow with Endocrinology  endo office visit 09/20/2020 noted    Pure hypercholesterolemia  Comments:  Not well controlled  Advised to take her medication on the daily basis  To follow with low-fat diet  Low vitamin D level  Comments:  Take vitamin-D on the regular basis  Following with Endocrinology    Screening for colon cancer  Comments:  Patient stated she had Cologuard 2 years ago will try to get the results  Declined colonoscopy  Orders:  -     Cancel: Cologuard; Future    Encounter for immunization  -     PNEUMOCOCCAL POLYSACCHARIDE VACCINE 23-VALENT =>1YO SQ IM    Tobacco abuse  Comments:  Consult patient to stop smoking    Encounter for screening mammogram for malignant neoplasm of breast  -     Mammo screening bilateral w 3d & cad; Future    Other orders  -     acetaminophen (TYLENOL) 500 mg tablet; every 8 (eight) hours  -     Discontinue: Dapagliflozin-metFORMIN HCl ER (Xigduo XR) 5-1000 MG TB24; Xigduo XR 5 mg-1,000 mg tablet,extended release   TAKE 1 TABLET BY MOUTH ONCE DAILY  -     Discontinue: amlodipine-olmesartan (BRIANNA) 5-40 MG; Take 1 tablet po qhs  -     Trulicity 1 5 RY/6 3UX SOPN; INJECT 1 5 MG UNDER THE SKIN EVERY 7 DAYS  -     Difluprednate (Durezol) 0 05 % EMUL; Durezol 0 05 % eye drops  -     insulin lispro (HumaLOG) 100 units/mL injection;  Inject 30 Units under the skin        Patient Instructions   To follow up with test results      Orders Placed This Encounter   Procedures    Mammo screening bilateral w 3d & cad     Standing Status:   Future     Standing Expiration Date:   1/13/2025     Scheduling Instructions:      Do not wear any perfume, powder, lotion or deodorant on the breast or underarm area  If you have had any prior breast studies done at a different facility than [facility], please bring a copy of the study with you on the day of your test  Please allow at least 30 minutes for this appointment  Please bring your insurance cards, a form of photo ID and a list of your medications with you  To schedule this appointment, please contact Central Scheduling at [phone]  Order Specific Question:   Approval for Diagnostic Call Back/Ultrasound If Necessary     Answer:   Yes     Order Specific Question:   Approval for Coordination for Additional Testing     Answer: Yes    PNEUMOCOCCAL POLYSACCHARIDE VACCINE 23-VALENT =>3YO SQ IM    Basic metabolic panel     This is a patient instruction: Patient fasting for 8 hours or longer recommended  Standing Status:   Future     Standing Expiration Date:   1/13/2022         Subjective:     Patient ID: Jaqueline Gaytan is a 72 y o  female      HPI  DM, following with Corie Obrien, had cataract surgery 2019, did not see Ophthalmology send then  Denied polyuria or polydipsia  Patient stated her blood sugar does fluctuate depend on what she eats     Denied sign of symptom of hypoglycemia  Hypertension  Patient stated she did not take her blood pressure medication for more than 1 week  Because she said when she takes it does upset her stomach  She did discussed that with her endocrinologist and he told her her side effect might go away  Admit to high salt intake  Denied headache or dizziness  Hyperlipidemia admit to regular fat intake denied chest pain she said she does not take Lipitor on the daily basis  Neuropathy    Secondary her diabetes  Well controlled with the Neurontin  Denied weight  Low vitamin-D  Denied falling denied depression  Also sometimes skips her vitamin-D  Screening for colon cancer patient stated she had Cologuard 2 years ago and it was negative  She said was order by her gynecologist     Test results done September 5, 2020 reviewed and discussed result with patient    Review of Systems   Constitutional: Negative for chills, diaphoresis and fatigue  HENT: Negative for dental problem, ear pain, sore throat, trouble swallowing and voice change  Eyes: Negative for pain, redness and visual disturbance  Respiratory: Negative for cough, chest tightness and shortness of breath  Cardiovascular: Negative for chest pain, palpitations and leg swelling  Gastrointestinal: Negative for abdominal pain, blood in stool, constipation, diarrhea and nausea  Endocrine: Negative for cold intolerance, heat intolerance, polydipsia and polyuria  Genitourinary: Negative for dysuria, flank pain, frequency, hematuria, pelvic pain, urgency, vaginal bleeding and vaginal discharge  Musculoskeletal: Negative for arthralgias, back pain, gait problem, myalgias and neck pain  Skin: Negative for rash  Allergic/Immunologic: Negative for food allergies  Neurological: Negative for dizziness, tremors, seizures, weakness, light-headedness, numbness and headaches  Hematological: Negative for adenopathy  Does not bruise/bleed easily  Psychiatric/Behavioral: Negative for confusion, dysphoric mood, hallucinations and sleep disturbance  The patient is not nervous/anxious  Objective:     Physical Exam  Constitutional:       General: She is not in acute distress  Appearance: She is well-developed  HENT:      Head: Normocephalic and atraumatic  Eyes:      General: No scleral icterus  Conjunctiva/sclera: Conjunctivae normal       Pupils: Pupils are equal, round, and reactive to light     Neck:      Musculoskeletal: Neck supple  Thyroid: No thyromegaly  Vascular: No carotid bruit or JVD  Cardiovascular:      Rate and Rhythm: Normal rate and regular rhythm  Pulses: no weak pulses          Carotid pulses are 3+ on the right side and 3+ on the left side  Dorsalis pedis pulses are 2+ on the right side and 2+ on the left side  Posterior tibial pulses are 2+ on the right side and 2+ on the left side  Heart sounds: Normal heart sounds  No murmur  Comments: Extremities  No edema  Pulmonary:      Effort: Pulmonary effort is normal       Breath sounds: Normal breath sounds  Abdominal:      General: Abdomen is flat  Bowel sounds are normal       Palpations: Abdomen is soft  There is no mass  Tenderness: There is no abdominal tenderness  There is no guarding or rebound  Hernia: No hernia is present  Musculoskeletal:      Right lower leg: No edema  Left lower leg: No edema  Feet:      Right foot:      Skin integrity: No ulcer, skin breakdown, erythema, warmth, callus or dry skin  Left foot:      Skin integrity: No ulcer, skin breakdown, erythema, warmth, callus or dry skin  Lymphadenopathy:      Cervical: No cervical adenopathy  Skin:     Coloration: Skin is not pale  Findings: No rash  Neurological:      General: No focal deficit present  Mental Status: She is alert and oriented to person, place, and time  Cranial Nerves: No cranial nerve deficit  Sensory: No sensory deficit  Motor: No weakness or abnormal muscle tone  Coordination: Coordination normal       Gait: Gait normal    Psychiatric:         Mood and Affect: Mood normal          Behavior: Behavior normal          Thought Content: Thought content normal          Judgment: Judgment normal      BMI Counseling: Body mass index is 28 32 kg/m²   The BMI is above normal  Nutrition recommendations include decreasing portion sizes, moderation in carbohydrate intake, reducing intake of saturated and trans fat and reducing intake of cholesterol  Diabetic Foot Exam    Right Foot/Ankle   Right Foot Inspection  Skin Exam: skin normal and skin intact no dry skin, no warmth, no callus, no erythema, no maceration, no abnormal color, no pre-ulcer, no ulcer and no callus                          Toe Exam: no swelling, no tenderness, erythema and  no right toe deformity  Sensory       Monofilament testing: intact  Vascular    The right DP pulse is 2+  The right PT pulse is 2+  Left Foot/Ankle  Left Foot Inspection  Skin Exam: skin normal and skin intactno dry skin, no warmth, no erythema, no maceration, normal color, no pre-ulcer, no ulcer and no callus                         Toe Exam: no swelling, no erythema and no left toe deformity                   Sensory       Monofilament: intact  Vascular    The left DP pulse is 2+  The left PT pulse is 2+  Assign Risk Category:  No deformity present; No loss of protective sensation;  No weak pulses       Risk: 0

## 2021-01-13 NOTE — TELEPHONE ENCOUNTER
Upon review of the In Basket request we were have found/obtained the documentation  After careful review of the document we are unable to complete this request for Mammogram because the documentation is considered an External Result  The documentation found is a copy of an original  We require the original document to close the gap(s)  no link for results/ not found in CE  Any additional questions or concerns should be emailed to the Practice Liaisons via [email or web address] email, please do not reply via In Basket      Thank you  Kamryn Hinson

## 2021-01-14 ENCOUNTER — TELEPHONE (OUTPATIENT)
Dept: FAMILY MEDICINE CLINIC | Facility: CLINIC | Age: 66
End: 2021-01-14

## 2021-01-14 NOTE — TELEPHONE ENCOUNTER
Patient stated she had a cologuard done through her GYN, patient had a FIT test done not the cologuard, I can not find anything in patients chart to update her HM

## 2021-02-08 DIAGNOSIS — I10 ESSENTIAL HYPERTENSION: ICD-10-CM

## 2021-02-08 RX ORDER — OLMESARTAN MEDOXOMIL 20 MG/1
TABLET ORAL
Qty: 30 TABLET | Refills: 1 | Status: SHIPPED | OUTPATIENT
Start: 2021-02-08 | End: 2021-02-23

## 2021-02-22 DIAGNOSIS — I10 ESSENTIAL HYPERTENSION: ICD-10-CM

## 2021-02-23 ENCOUNTER — TELEPHONE (OUTPATIENT)
Dept: ADMINISTRATIVE | Facility: OTHER | Age: 66
End: 2021-02-23

## 2021-02-23 RX ORDER — OLMESARTAN MEDOXOMIL 20 MG/1
TABLET ORAL
Qty: 90 TABLET | Refills: 1 | Status: SHIPPED | OUTPATIENT
Start: 2021-02-23 | End: 2022-07-19 | Stop reason: ALTCHOICE

## 2021-02-23 NOTE — TELEPHONE ENCOUNTER
----- Message from Peggy Stanton sent at 2/23/2021  7:18 AM EST -----  Regarding: Levi Abdi Request  02/23/21 7:18 AM    Hello, our patient Aline Goins has had Mammogram completed/performed  Please assist in updating the patient chart by pulling the Care Everywhere (CE) document  The date of service is 12/11/2020       Thank you,  Bigg Hernandez MA   [facility]

## 2021-02-23 NOTE — TELEPHONE ENCOUNTER
Upon review of the In Basket request we have found/obtained the documentation  The status of this report is in progress/pending/awaiting final  Due to protocols, we are unable to hold requests for resulting/linking of a pending/ in progress/awaiting final items and are unable to proceed  Any additional questions or concerns should be emailed to the Practice Liaisons via [email or web address] email, please do not reply via In Basket      Thank you  Dennys Tabares MA

## 2021-03-23 ENCOUNTER — ANNUAL EXAM (OUTPATIENT)
Dept: GYNECOLOGY | Facility: CLINIC | Age: 66
End: 2021-03-23
Payer: COMMERCIAL

## 2021-03-23 VITALS
DIASTOLIC BLOOD PRESSURE: 64 MMHG | SYSTOLIC BLOOD PRESSURE: 160 MMHG | WEIGHT: 143.2 LBS | HEART RATE: 93 BPM | HEIGHT: 60 IN | BODY MASS INDEX: 28.11 KG/M2

## 2021-03-23 DIAGNOSIS — Z13.820 ENCOUNTER FOR SCREENING FOR OSTEOPOROSIS: Primary | ICD-10-CM

## 2021-03-23 DIAGNOSIS — M81.0 AGE-RELATED OSTEOPOROSIS WITHOUT CURRENT PATHOLOGICAL FRACTURE: ICD-10-CM

## 2021-03-23 DIAGNOSIS — Z78.0 MENOPAUSE: ICD-10-CM

## 2021-03-23 DIAGNOSIS — Z12.4 ENCOUNTER FOR PAPANICOLAOU SMEAR FOR CERVICAL CANCER SCREENING: ICD-10-CM

## 2021-03-23 DIAGNOSIS — N95.2 ATROPHIC VAGINITIS: ICD-10-CM

## 2021-03-23 DIAGNOSIS — Z12.31 ENCOUNTER FOR SCREENING MAMMOGRAM FOR MALIGNANT NEOPLASM OF BREAST: ICD-10-CM

## 2021-03-23 PROCEDURE — G0145 SCR C/V CYTO,THINLAYER,RESCR: HCPCS | Performed by: OBSTETRICS & GYNECOLOGY

## 2021-03-23 PROCEDURE — 3008F BODY MASS INDEX DOCD: CPT | Performed by: FAMILY MEDICINE

## 2021-03-23 PROCEDURE — S0612 ANNUAL GYNECOLOGICAL EXAMINA: HCPCS | Performed by: OBSTETRICS & GYNECOLOGY

## 2021-03-23 RX ORDER — OMEGA-3-ACID ETHYL ESTERS 1 G/1
2 CAPSULE, LIQUID FILLED ORAL 2 TIMES DAILY
COMMUNITY

## 2021-03-23 NOTE — PROGRESS NOTES
Assessment/Plan:         Diagnoses and all orders for this visit:    Encounter for screening for osteoporosis  -     DXA bone density spine hip and pelvis; Future    Menopause  -     DXA bone density spine hip and pelvis; Future    Encounter for screening mammogram for malignant neoplasm of breast  -     Mammo screening bilateral w 3d & cad; Future    Encounter for Papanicolaou smear for cervical cancer screening  -     Liquid-based pap, screening    Atrophic vaginitis; asymptomatic follow    Age-related osteoporosis without current pathological fracture; recheck DEXA scan  If any further progression consider Prolia    Other orders  -     omega-3-acid ethyl esters (LOVAZA) 1 g capsule; Take 2 g by mouth 2 (two) times a day        Subjective:      Patient ID: Divina Hahn is a 72 y o  female  HPI   patient presents for annual examination  She offers no complaints  Denies any vaginal irritation, burning, discharge or bleeding  Denies any dysuria, hematuria urgency or stress incontinence  Denies any GI complaints  Colon cancer screening via colo guard 2020: Negative     DEXA scan October 2019 revealed osteoporosis of the femoral neck with osteopenia lumbar spine and hip  Patient had previously been on Boniva but had discontinued that on her own  The following portions of the patient's history were reviewed and updated as appropriate:   She  has a past medical history of Absent pulse, Depression, Diabetes ([address] ), HLD (hyperlipidemia), Neuropathy, Osteopenia, Prominence of large joints, and Weight loss    She   Patient Active Problem List    Diagnosis Date Noted    Leukocytosis 08/20/2019    Acute bronchitis 08/19/2019    Diaphoresis 08/19/2019    HLD (hyperlipidemia) 08/19/2019    Tobacco abuse 08/19/2019    Elevated blood pressure reading 08/19/2019    Osteopenia     Uncontrolled insulin-dependent diabetes mellitus with hyperglycemia     Depression      She  has a past surgical history that includes Eye surgery (2018) and Trigger finger release (Left, 03/12/2021)  Her family history includes Alzheimer's disease in her mother; Diabetes in her sister; Heart attack in her father; No Known Problems in her daughter, daughter, maternal grandmother, paternal grandmother, sister, sister, sister, sister, sister, and sister  She  reports that she has been smoking cigarettes  She has a 10 00 pack-year smoking history  She has never used smokeless tobacco  She reports previous alcohol use  She reports that she does not use drugs  Current Outpatient Medications   Medication Sig Dispense Refill    acetaminophen (TYLENOL) 500 mg tablet every 8 (eight) hours      cholecalciferol (VITAMIN D3) 1,000 units tablet Take 1,000 Units by mouth daily      gabapentin (NEURONTIN) 300 mg capsule Take 1 capsule (300 mg total) by mouth 3 (three) times a day 90 capsule 0    insulin lispro (HumaLOG) 100 units/mL injection Inject 30 Units under the skin      olmesartan (BENICAR) 20 mg tablet TAKE 1 TABLET BY MOUTH EVERY DAY 90 tablet 1    omega-3-acid ethyl esters (LOVAZA) 1 g capsule Take 2 g by mouth 2 (two) times a day      Trulicity 1 5 EO/4 1CL SOPN INJECT 1 5 MG UNDER THE SKIN EVERY 7 DAYS   atorvastatin (LIPITOR) 80 mg tablet Take 1 tablet (80 mg total) by mouth daily with dinner (Patient not taking: Reported on 3/23/2021) 30 tablet 0    Difluprednate (Durezol) 0 05 % EMUL Durezol 0 05 % eye drops      insulin aspart (NOVOLOG FLEXPEN) 100 Units/mL injection pen Inject 10 Units under the skin 3 (three) times a day with meals for 30 days (Patient not taking: Reported on 3/23/2021) 3 pen 0    insulin glargine (BASAGLAR KWIKPEN) 100 units/mL injection pen Inject 30 Units under the skin daily for 30 days 3 pen 0     No current facility-administered medications for this visit        Current Outpatient Medications on File Prior to Visit   Medication Sig    acetaminophen (TYLENOL) 500 mg tablet every 8 (eight) hours  cholecalciferol (VITAMIN D3) 1,000 units tablet Take 1,000 Units by mouth daily    gabapentin (NEURONTIN) 300 mg capsule Take 1 capsule (300 mg total) by mouth 3 (three) times a day    insulin lispro (HumaLOG) 100 units/mL injection Inject 30 Units under the skin    olmesartan (BENICAR) 20 mg tablet TAKE 1 TABLET BY MOUTH EVERY DAY    omega-3-acid ethyl esters (LOVAZA) 1 g capsule Take 2 g by mouth 2 (two) times a day    Trulicity 1 5 EL/7 2YY SOPN INJECT 1 5 MG UNDER THE SKIN EVERY 7 DAYS   atorvastatin (LIPITOR) 80 mg tablet Take 1 tablet (80 mg total) by mouth daily with dinner (Patient not taking: Reported on 3/23/2021)    Difluprednate (Durezol) 0 05 % EMUL Durezol 0 05 % eye drops    insulin aspart (NOVOLOG FLEXPEN) 100 Units/mL injection pen Inject 10 Units under the skin 3 (three) times a day with meals for 30 days (Patient not taking: Reported on 3/23/2021)    insulin glargine (BASAGLAR KWIKPEN) 100 units/mL injection pen Inject 30 Units under the skin daily for 30 days     No current facility-administered medications on file prior to visit  She is allergic to oxycodone       Review of Systems   Constitutional: Negative  HENT: Negative for sore throat and trouble swallowing  Gastrointestinal: Negative  Genitourinary: Negative  Objective:      /64   Pulse 93   Ht 4' 11 5" (1 511 m)   Wt 65 kg (143 lb 3 2 oz)   BMI 28 44 kg/m²          Physical Exam  Vitals signs reviewed  Constitutional:       Appearance: Normal appearance  Neck:      Musculoskeletal: Normal range of motion and neck supple  No muscular tenderness  Cardiovascular:      Rate and Rhythm: Normal rate and regular rhythm  Pulses: Normal pulses  Heart sounds: Normal heart sounds  No murmur  Pulmonary:      Effort: Pulmonary effort is normal  No respiratory distress  Breath sounds: Normal breath sounds     Chest:      Breasts:         Right: No swelling, bleeding, inverted nipple, mass, nipple discharge, skin change or tenderness  Left: No swelling, bleeding, inverted nipple, mass, nipple discharge, skin change or tenderness  Abdominal:      General: There is no distension  Palpations: Abdomen is soft  There is no mass  Tenderness: There is no abdominal tenderness  There is no guarding or rebound  Hernia: No hernia is present  There is no hernia in the left inguinal area or right inguinal area  Genitourinary:     General: Normal vulva  Labia:         Right: No rash, tenderness or lesion  Left: No rash, tenderness or lesion  Vagina: Normal       Cervix: Normal       Uterus: Normal        Adnexa:         Right: No mass, tenderness or fullness  Left: No mass, tenderness or fullness  Lymphadenopathy:      Cervical: No cervical adenopathy  Upper Body:      Right upper body: No supraclavicular, axillary or pectoral adenopathy  Left upper body: No supraclavicular, axillary or pectoral adenopathy  Lower Body: No right inguinal adenopathy  No left inguinal adenopathy  Neurological:      Mental Status: She is alert

## 2021-03-30 LAB
LAB AP GYN PRIMARY INTERPRETATION: NORMAL
Lab: NORMAL

## 2021-04-21 ENCOUNTER — TELEPHONE (OUTPATIENT)
Dept: FAMILY MEDICINE CLINIC | Facility: CLINIC | Age: 66
End: 2021-04-21

## 2021-04-21 NOTE — TELEPHONE ENCOUNTER
LM for pt to call back she is due for DM eye exam, if she does not have eye dr, referral will be sent

## 2021-05-05 ENCOUNTER — OFFICE VISIT (OUTPATIENT)
Dept: FAMILY MEDICINE CLINIC | Facility: CLINIC | Age: 66
End: 2021-05-05
Payer: COMMERCIAL

## 2021-05-05 VITALS
BODY MASS INDEX: 28.67 KG/M2 | DIASTOLIC BLOOD PRESSURE: 64 MMHG | HEIGHT: 59 IN | OXYGEN SATURATION: 96 % | WEIGHT: 142.2 LBS | HEART RATE: 82 BPM | TEMPERATURE: 97.3 F | SYSTOLIC BLOOD PRESSURE: 130 MMHG

## 2021-05-05 DIAGNOSIS — Z12.11 COLON CANCER SCREENING: ICD-10-CM

## 2021-05-05 DIAGNOSIS — M81.0 OSTEOPOROSIS WITHOUT CURRENT PATHOLOGICAL FRACTURE, UNSPECIFIED OSTEOPOROSIS TYPE: ICD-10-CM

## 2021-05-05 DIAGNOSIS — Z79.4 TYPE 2 DIABETES MELLITUS WITH HYPERGLYCEMIA, WITH LONG-TERM CURRENT USE OF INSULIN (HCC): ICD-10-CM

## 2021-05-05 DIAGNOSIS — E11.65 TYPE 2 DIABETES MELLITUS WITH HYPERGLYCEMIA, WITH LONG-TERM CURRENT USE OF INSULIN (HCC): ICD-10-CM

## 2021-05-05 DIAGNOSIS — Z12.11 SCREENING FOR COLON CANCER: ICD-10-CM

## 2021-05-05 DIAGNOSIS — Z72.0 TOBACCO ABUSE: ICD-10-CM

## 2021-05-05 DIAGNOSIS — R53.1 WEAKNESS: ICD-10-CM

## 2021-05-05 DIAGNOSIS — I10 ESSENTIAL HYPERTENSION: ICD-10-CM

## 2021-05-05 DIAGNOSIS — R79.89 LOW VITAMIN D LEVEL: ICD-10-CM

## 2021-05-05 DIAGNOSIS — R79.9 ABNORMAL BLOOD CHEMISTRY: ICD-10-CM

## 2021-05-05 DIAGNOSIS — E66.3 OVER WEIGHT: ICD-10-CM

## 2021-05-05 DIAGNOSIS — Z01.118 ABNORMAL HEARING TEST: ICD-10-CM

## 2021-05-05 DIAGNOSIS — E78.00 PURE HYPERCHOLESTEROLEMIA: ICD-10-CM

## 2021-05-05 DIAGNOSIS — Z00.00 WELL ADULT EXAM: Primary | ICD-10-CM

## 2021-05-05 DIAGNOSIS — Z53.20 HIV SCREENING DECLINED: ICD-10-CM

## 2021-05-05 DIAGNOSIS — Z12.31 ENCOUNTER FOR SCREENING MAMMOGRAM FOR MALIGNANT NEOPLASM OF BREAST: ICD-10-CM

## 2021-05-05 DIAGNOSIS — R09.89 DECREASED PEDAL PULSES: ICD-10-CM

## 2021-05-05 DIAGNOSIS — R94.128 ABNORMAL HEARING TEST: ICD-10-CM

## 2021-05-05 PROCEDURE — 1160F RVW MEDS BY RX/DR IN RCRD: CPT | Performed by: FAMILY MEDICINE

## 2021-05-05 PROCEDURE — 3078F DIAST BP <80 MM HG: CPT | Performed by: FAMILY MEDICINE

## 2021-05-05 PROCEDURE — 3008F BODY MASS INDEX DOCD: CPT | Performed by: FAMILY MEDICINE

## 2021-05-05 PROCEDURE — 99397 PER PM REEVAL EST PAT 65+ YR: CPT | Performed by: FAMILY MEDICINE

## 2021-05-05 PROCEDURE — 3075F SYST BP GE 130 - 139MM HG: CPT | Performed by: FAMILY MEDICINE

## 2021-05-05 PROCEDURE — 3725F SCREEN DEPRESSION PERFORMED: CPT | Performed by: FAMILY MEDICINE

## 2021-05-05 PROCEDURE — 99214 OFFICE O/P EST MOD 30 MIN: CPT | Performed by: FAMILY MEDICINE

## 2021-05-05 RX ORDER — ROSUVASTATIN CALCIUM 40 MG/1
40 TABLET, COATED ORAL DAILY
COMMUNITY
End: 2022-07-19 | Stop reason: ALTCHOICE

## 2021-05-05 NOTE — PROGRESS NOTES
Assessment/Plan:       No problem-specific Assessment & Plan notes found for this encounter  Diagnoses and all orders for this visit:    Well adult exam    Over weight  Comments:  Advised to lose weight  Diet discussed  Advised to walk half daily  Type 2 diabetes mellitus with hyperglycemia, with long-term current use of insulin (HCC)  Comments: To follow with 1800 calorie diet  Patient follow with endo  Orders:  -     Ambulatory referral to Ophthalmology; Future    Colon cancer screening  -     Cologuard; Future    Pure hypercholesterolemia  Comments:  LDL is not well controlled, advised to take Crestor daily    Essential hypertension  Comments:  Controlled  To follow with the dash diet    Low vitamin D level  Comments:  Not well compensated  To follow with Endo    Abnormal hearing test  Comments:  right ear  Recommend referral to ENT  Patient declined    Screening for colon cancer  Comments:  Patient declined colonoscopy    Tobacco abuse  Comments:  Consel patient to stop smoking  Abnormal blood chemistry  Comments:  Low anion gap  Patient will have BMP, she has a slip from Endo    Weakness  Comments:  Juggly feeling of the legs, recommend PT  Pt declined  Recommend to walk 1/2 hour daily  If symptoms persist in 1 week to call to consider further evaluation    Decreased pedal pulses  Comments:  left DP, discussed checking arterial Doppler of the lower extremities  Patient declined    HIV screening declined    Encounter for screening mammogram for malignant neoplasm of breast  Comments:  Check Mammogram  order exist    Osteoporosis without current pathological fracture, unspecified osteoporosis type  Comments:  Check Bone density order  Order exit    Other orders  -     rosuvastatin (CRESTOR) 40 MG tablet;  Take 40 mg by mouth daily        Patient Instructions   To call in 1-2 weeks of her leg symptoms persist  To follow up with test results      Orders Placed This Encounter   Procedures    Cologuard     Standing Status:   Future     Standing Expiration Date:   5/3/2022    Ambulatory referral to Ophthalmology     Standing Status:   Future     Standing Expiration Date:   5/3/2022     Referral Priority:   Routine     Referral Type:   Consult - AMB     Referral Reason:   Specialty Services Required     Requested Specialty:   Ophthalmology     Number of Visits Requested:   1     Expiration Date:   5/3/2022         Subjective:     Patient ID: Aleah Sanchez is a 72 y o  female      HPI   Well adult exam  She feels well  Denied anxiety or depression  She does smoke  Does not drink alcohol or does drugs  Eye exam ,last year  Dental exam about 1 year, follow periodically with her dental  Gyn exam, she had Pap and gyn exam recent  manual breast exam done recently by gyn  Mammogram   Done last December   For colon cancer she had Hemoccult screening 2 years ago  Never had colonoscopy  Immunization  Reviewed  Exercise, she just  start working in her garden  Diet, regular  Over weighy   Trying to lose weight by cutting down on bread intake    Review of Systems   Constitutional: Negative for activity change, appetite change, chills, fatigue, fever and unexpected weight change  HENT: Negative for congestion, dental problem, ear discharge, ear pain, hearing loss, nosebleeds, rhinorrhea, sinus pressure, sore throat, tinnitus, trouble swallowing and voice change  Eyes: Negative for photophobia, pain, itching and visual disturbance  Respiratory: Negative for cough, chest tightness, shortness of breath and wheezing  Cardiovascular: Negative for chest pain, palpitations and leg swelling  Gastrointestinal: Negative for abdominal pain, anal bleeding, blood in stool, constipation, diarrhea, nausea and vomiting  Endocrine: Negative for cold intolerance, heat intolerance, polydipsia and polyuria  Genitourinary: Negative for dysuria, frequency, hematuria and urgency     Musculoskeletal: Negative for arthralgias, back pain, gait problem, joint swelling, myalgias and neck pain  Skin: Negative for color change and rash  Neurological: Negative for dizziness, tremors, seizures, syncope, weakness, light-headedness, numbness and headaches  Hematological: Negative for adenopathy  Does not bruise/bleed easily  Psychiatric/Behavioral: Negative for agitation, behavioral problems, confusion, dysphoric mood, hallucinations and sleep disturbance  The patient is not nervous/anxious  Objective:     Physical Exam  Constitutional:       General: She is not in acute distress  Appearance: Normal appearance  She is well-developed  She is not ill-appearing or diaphoretic  HENT:      Head: Normocephalic and atraumatic  Right Ear: Tympanic membrane, ear canal and external ear normal  There is no impacted cerumen  Left Ear: Tympanic membrane, ear canal and external ear normal  There is no impacted cerumen  Ears:      Comments: Whisper test normal left ear , not normal  Right ear     Nose: Nose normal       Comments: Nose, and throat  Not examined  Patient has a mask  Due to COVID     Mouth/Throat:      Pharynx: No oropharyngeal exudate  Eyes:      General: No scleral icterus  Right eye: No discharge  Left eye: No discharge  Extraocular Movements: Extraocular movements intact  Conjunctiva/sclera: Conjunctivae normal       Pupils: Pupils are equal, round, and reactive to light  Neck:      Musculoskeletal: Normal range of motion and neck supple  No muscular tenderness  Thyroid: No thyromegaly  Vascular: No carotid bruit or JVD  Cardiovascular:      Rate and Rhythm: Normal rate and regular rhythm  Pulses:           Carotid pulses are 3+ on the right side and 3+ on the left side  Dorsalis pedis pulses are 3+ on the right side and 3+ on the left side  Posterior tibial pulses are 3+ on the right side and 3+ on the left side        Heart sounds: Normal heart sounds  No murmur  Pulmonary:      Effort: Pulmonary effort is normal  No respiratory distress  Breath sounds: Normal breath sounds  No stridor  No wheezing or rales  Abdominal:      General: Abdomen is flat  Bowel sounds are normal  There is no distension  Palpations: Abdomen is soft  There is no mass  Tenderness: There is no abdominal tenderness  There is no guarding or rebound  Hernia: No hernia is present  Musculoskeletal: Normal range of motion  General: No tenderness or deformity  Right lower leg: No edema  Left lower leg: No edema  Lymphadenopathy:      Cervical: No cervical adenopathy  Skin:     Coloration: Skin is not jaundiced or pale  Findings: No bruising or rash  Neurological:      General: No focal deficit present  Mental Status: She is alert and oriented to person, place, and time  Cranial Nerves: No cranial nerve deficit  Sensory: No sensory deficit  Motor: No weakness or abnormal muscle tone  Coordination: Coordination normal       Gait: Gait normal       Deep Tendon Reflexes: Reflexes normal       Comments: Babinski is negative bilaterally  Negative Romberg   Psychiatric:         Mood and Affect: Mood normal          Behavior: Behavior normal          Thought Content:  Thought content normal          Judgment: Judgment normal

## 2021-05-05 NOTE — PROGRESS NOTES
Assessment/Plan:       No problem-specific Assessment & Plan notes found for this encounter  Diagnoses and all orders for this visit:    Well adult exam    Over weight  Comments:  Advised to lose weight  Diet discussed  Advised to walk half daily  Type 2 diabetes mellitus with hyperglycemia, with long-term current use of insulin (HCC)  Comments: To follow with 1800 calorie diet  Patient follow with endo  Orders:  -     Ambulatory referral to Ophthalmology; Future    Colon cancer screening  -     Cologuard; Future    Pure hypercholesterolemia  Comments:  LDL is not well controlled, advised to take Crestor daily    Essential hypertension  Comments:  Controlled  To follow with the dash diet    Low vitamin D level  Comments:  Not well compensated  To follow with Endo    Abnormal hearing test  Comments:  right ear  Recommend referral to ENT  Patient declined    Screening for colon cancer  Comments:  Patient declined colonoscopy    Tobacco abuse  Comments:  Consel patient to stop smoking  Abnormal blood chemistry  Comments:  Low anion gap  Patient will have BMP, she has a slip from Endo    Weakness  Comments:  Juggly feeling of the legs, recommend PT  Pt declined  Recommend to walk 1/2 hour daily  If symptoms persist in 1 week to call to consider further evaluation    Decreased pedal pulses  Comments:  left DP, discussed checking arterial Doppler of the lower extremities  Patient declined    HIV screening declined    Encounter for screening mammogram for malignant neoplasm of breast  Comments:  Check Mammogram  order exist    Osteoporosis without current pathological fracture, unspecified osteoporosis type  Comments:  Check Bone density order  Order exit    Other orders  -     rosuvastatin (CRESTOR) 40 MG tablet;  Take 40 mg by mouth daily        Patient Instructions   To call in 1-2 weeks of her leg symptoms persist  To follow up with test results      Orders Placed This Encounter   Procedures    [place]     Standing Status:   Future     Standing Expiration Date:   5/3/2022    Ambulatory referral to Ophthalmology     Standing Status:   Future     Standing Expiration Date:   5/3/2022     Referral Priority:   Routine     Referral Type:   Consult - AMB     Referral Reason:   Specialty Services Required     Requested Specialty:   Ophthalmology     Number of Visits Requested:   1     Expiration Date:   5/3/2022         Subjective:     Patient ID: Anusha Reyes is a 72 y o  female      HPI   New complaint  Juggly feeling of the legs  Patient stated for the last 1 week when she said for more than 1 hour at 1 time ,and she try to stand up she feels her legs are juggly until   she takes few steps then symptoms corrected  Patient stated if she sat and keep moving her lower extremities while as she is sitting she has no problem  Denied low back pain, denied weakness of the extremities  Denied hips, knees or ankles pain  Patient admit to chronic numbness secondary to her peripheral neuropathy  No change  Denied injury to the legs  Follow-up  Hypertension  She stated it she start Benicar since last office visit  Has no side effect  Admit to regular salt intake  Denied headache or dizziness  Hyperlipidemia  Patient does take Crestor 40 mg but she does not take it on the daily basis  DM  today random 157, fasting 87  She follow with Endocrinology  She does use insulin p r n  It through her insulin pump according to the guideline from her endocrinologist   Naa Colón sign and symptoms of hypoglycemia  Peripheral neuropathy  Symptoms are stable  Test results  pap done March 23021  Lab done January 31, 2021  Discussed result with patient    Review of Systems   Constitutional: Negative for activity change, appetite change, chills, fatigue, fever and unexpected weight change     HENT: Negative for congestion, ear discharge, ear pain, hearing loss, nosebleeds, rhinorrhea, sinus pressure, sore throat, tinnitus, trouble swallowing and voice change  Eyes: Negative for photophobia, pain and visual disturbance  Respiratory: Negative for cough, chest tightness, shortness of breath and wheezing  Cardiovascular: Negative for chest pain, palpitations and leg swelling  Gastrointestinal: Negative for abdominal pain, anal bleeding, blood in stool, constipation, diarrhea, nausea and vomiting  Endocrine: Negative for cold intolerance, heat intolerance, polydipsia and polyuria  Genitourinary: Negative for dysuria, frequency, hematuria and urgency  Musculoskeletal: Negative for arthralgias, back pain, gait problem, joint swelling, myalgias and neck pain  Skin: Negative for rash  Neurological: Positive for numbness  Negative for dizziness, tremors, seizures, syncope, speech difficulty, weakness, light-headedness and headaches  Hematological: Negative for adenopathy  Does not bruise/bleed easily  Psychiatric/Behavioral: Negative for agitation, behavioral problems, confusion, dysphoric mood, hallucinations and sleep disturbance  The patient is not nervous/anxious  Objective:     Physical Exam  Constitutional:       General: She is not in acute distress  Appearance: Normal appearance  She is well-developed  HENT:      Head: Normocephalic and atraumatic  Eyes:      General: No scleral icterus  Conjunctiva/sclera: Conjunctivae normal       Pupils: Pupils are equal, round, and reactive to light  Neck:      Thyroid: No thyromegaly  Vascular: No carotid bruit or JVD  Cardiovascular:      Rate and Rhythm: Normal rate and regular rhythm  Pulses:           Dorsalis pedis pulses are 3+ on the right side and 2+ on the left side  Posterior tibial pulses are 3+ on the right side and 3+ on the left side  Heart sounds: Normal heart sounds  No murmur  Comments: Extremities  No edema  Pulmonary:      Effort: Pulmonary effort is normal       Breath sounds: Normal breath sounds  Abdominal:      Palpations: Abdomen is soft  There is no mass  Tenderness: There is no abdominal tenderness  There is no guarding or rebound  Hernia: No hernia is present  Musculoskeletal:         General: No swelling, tenderness or signs of injury  Right lower leg: No edema  Left lower leg: No edema  Comments: Back no spine or muscle tenderness  Both hips  Both knees  Both ankle joints  No acute changes and has for range of motion   Lymphadenopathy:      Cervical: No cervical adenopathy  Skin:     Findings: No rash  Neurological:      Mental Status: She is alert and oriented to person, place, and time  Cranial Nerves: No cranial nerve deficit  Sensory: No sensory deficit  Motor: No weakness or abnormal muscle tone  Coordination: Coordination normal       Gait: Gait normal       Deep Tendon Reflexes: Reflexes normal       Comments: Both legs with normal strength and sensation  Negative Babinski   Psychiatric:         Mood and Affect: Mood normal          Behavior: Behavior normal          Thought Content: Thought content normal          Judgment: Judgment normal           Falls Plan of Care: balance, strength, and gait training instructions were provided  Home safety education provided

## 2021-05-06 PROBLEM — Z53.20 HIV SCREENING DECLINED: Status: ACTIVE | Noted: 2021-05-06

## 2021-05-14 ENCOUNTER — VBI (OUTPATIENT)
Dept: ADMINISTRATIVE | Facility: OTHER | Age: 66
End: 2021-05-14

## 2021-06-18 ENCOUNTER — VBI (OUTPATIENT)
Dept: ADMINISTRATIVE | Facility: OTHER | Age: 66
End: 2021-06-18

## 2021-09-09 ENCOUNTER — VBI (OUTPATIENT)
Dept: ADMINISTRATIVE | Facility: OTHER | Age: 66
End: 2021-09-09

## 2021-11-03 ENCOUNTER — VBI (OUTPATIENT)
Dept: ADMINISTRATIVE | Facility: OTHER | Age: 66
End: 2021-11-03

## 2021-11-23 ENCOUNTER — TELEPHONE (OUTPATIENT)
Dept: FAMILY MEDICINE CLINIC | Facility: CLINIC | Age: 66
End: 2021-11-23

## 2021-11-24 ENCOUNTER — OFFICE VISIT (OUTPATIENT)
Dept: FAMILY MEDICINE CLINIC | Facility: CLINIC | Age: 66
End: 2021-11-24
Payer: COMMERCIAL

## 2021-11-24 VITALS
SYSTOLIC BLOOD PRESSURE: 131 MMHG | WEIGHT: 140.2 LBS | HEIGHT: 59 IN | DIASTOLIC BLOOD PRESSURE: 70 MMHG | OXYGEN SATURATION: 99 % | HEART RATE: 80 BPM | TEMPERATURE: 96.7 F | BODY MASS INDEX: 28.26 KG/M2

## 2021-11-24 DIAGNOSIS — Z72.0 TOBACCO ABUSE: ICD-10-CM

## 2021-11-24 DIAGNOSIS — M81.0 OSTEOPOROSIS WITHOUT CURRENT PATHOLOGICAL FRACTURE, UNSPECIFIED OSTEOPOROSIS TYPE: ICD-10-CM

## 2021-11-24 DIAGNOSIS — I10 ESSENTIAL HYPERTENSION: ICD-10-CM

## 2021-11-24 DIAGNOSIS — R09.89 DECREASED PEDAL PULSES: ICD-10-CM

## 2021-11-24 DIAGNOSIS — E11.42 TYPE 2 DIABETES MELLITUS WITH DIABETIC POLYNEUROPATHY, WITH LONG-TERM CURRENT USE OF INSULIN (HCC): ICD-10-CM

## 2021-11-24 DIAGNOSIS — Z23 IMMUNIZATION DUE: ICD-10-CM

## 2021-11-24 DIAGNOSIS — Z79.4 TYPE 2 DIABETES MELLITUS WITH DIABETIC POLYNEUROPATHY, WITH LONG-TERM CURRENT USE OF INSULIN (HCC): ICD-10-CM

## 2021-11-24 DIAGNOSIS — S00.83XS CONTUSION OF OTHER PART OF HEAD, SEQUELA: ICD-10-CM

## 2021-11-24 DIAGNOSIS — R79.89 ELEVATED PLATELET COUNT: ICD-10-CM

## 2021-11-24 DIAGNOSIS — E78.2 MIXED HYPERLIPIDEMIA: ICD-10-CM

## 2021-11-24 DIAGNOSIS — Z00.00 MEDICARE WELCOME EXAM: Primary | ICD-10-CM

## 2021-11-24 DIAGNOSIS — J01.00 ACUTE MAXILLARY SINUSITIS, RECURRENCE NOT SPECIFIED: ICD-10-CM

## 2021-11-24 DIAGNOSIS — R79.89 LOW VITAMIN D LEVEL: ICD-10-CM

## 2021-11-24 PROBLEM — S00.93XA HEAD CONTUSION: Status: ACTIVE | Noted: 2021-11-24

## 2021-11-24 PROCEDURE — 1125F AMNT PAIN NOTED PAIN PRSNT: CPT | Performed by: FAMILY MEDICINE

## 2021-11-24 PROCEDURE — 3288F FALL RISK ASSESSMENT DOCD: CPT | Performed by: FAMILY MEDICINE

## 2021-11-24 PROCEDURE — 1170F FXNL STATUS ASSESSED: CPT | Performed by: FAMILY MEDICINE

## 2021-11-24 PROCEDURE — G0402 INITIAL PREVENTIVE EXAM: HCPCS | Performed by: FAMILY MEDICINE

## 2021-11-24 PROCEDURE — 99214 OFFICE O/P EST MOD 30 MIN: CPT | Performed by: FAMILY MEDICINE

## 2021-11-24 PROCEDURE — 1160F RVW MEDS BY RX/DR IN RCRD: CPT | Performed by: FAMILY MEDICINE

## 2021-11-24 PROCEDURE — 3078F DIAST BP <80 MM HG: CPT | Performed by: FAMILY MEDICINE

## 2021-11-24 PROCEDURE — 3075F SYST BP GE 130 - 139MM HG: CPT | Performed by: FAMILY MEDICINE

## 2021-11-24 PROCEDURE — 3725F SCREEN DEPRESSION PERFORMED: CPT | Performed by: FAMILY MEDICINE

## 2021-11-24 PROCEDURE — 3008F BODY MASS INDEX DOCD: CPT | Performed by: FAMILY MEDICINE

## 2021-11-24 RX ORDER — ERGOCALCIFEROL 1.25 MG/1
50000 CAPSULE ORAL
COMMUNITY

## 2021-11-24 RX ORDER — LORATADINE 10 MG/1
10 TABLET ORAL DAILY
Qty: 30 TABLET | Refills: 0 | Status: SHIPPED | OUTPATIENT
Start: 2021-11-24 | End: 2022-07-12 | Stop reason: SDUPTHER

## 2021-11-24 RX ORDER — AMOXICILLIN 500 MG/1
500 CAPSULE ORAL EVERY 8 HOURS SCHEDULED
Qty: 30 CAPSULE | Refills: 0 | Status: SHIPPED | OUTPATIENT
Start: 2021-11-24 | End: 2021-12-04

## 2021-12-14 ENCOUNTER — TELEPHONE (OUTPATIENT)
Dept: FAMILY MEDICINE CLINIC | Facility: CLINIC | Age: 66
End: 2021-12-14

## 2021-12-30 ENCOUNTER — VBI (OUTPATIENT)
Dept: ADMINISTRATIVE | Facility: OTHER | Age: 66
End: 2021-12-30

## 2022-01-31 ENCOUNTER — HOSPITAL ENCOUNTER (OUTPATIENT)
Dept: CT IMAGING | Facility: HOSPITAL | Age: 67
Discharge: HOME | End: 2022-01-31
Attending: FAMILY MEDICINE
Payer: COMMERCIAL

## 2022-01-31 ENCOUNTER — TELEPHONE (OUTPATIENT)
Dept: FAMILY MEDICINE CLINIC | Facility: CLINIC | Age: 67
End: 2022-01-31

## 2022-01-31 ENCOUNTER — TELEMEDICINE (OUTPATIENT)
Dept: FAMILY MEDICINE CLINIC | Facility: CLINIC | Age: 67
End: 2022-01-31
Payer: COMMERCIAL

## 2022-01-31 ENCOUNTER — CLINICAL SUPPORT (OUTPATIENT)
Dept: URGENT CARE | Age: 67
End: 2022-01-31
Payer: COMMERCIAL

## 2022-01-31 ENCOUNTER — APPOINTMENT (OUTPATIENT)
Dept: RADIOLOGY | Age: 67
End: 2022-01-31
Payer: COMMERCIAL

## 2022-01-31 ENCOUNTER — APPOINTMENT (OUTPATIENT)
Dept: LAB | Facility: HOSPITAL | Age: 67
End: 2022-01-31
Attending: FAMILY MEDICINE
Payer: COMMERCIAL

## 2022-01-31 VITALS — DIASTOLIC BLOOD PRESSURE: 66 MMHG | SYSTOLIC BLOOD PRESSURE: 144 MMHG | TEMPERATURE: 98.4 F

## 2022-01-31 DIAGNOSIS — R41.0 DISORIENTED: ICD-10-CM

## 2022-01-31 DIAGNOSIS — N28.9 ABNORMAL RENAL FUNCTION: Primary | ICD-10-CM

## 2022-01-31 DIAGNOSIS — R55 SYNCOPE, UNSPECIFIED SYNCOPE TYPE: Primary | ICD-10-CM

## 2022-01-31 DIAGNOSIS — R55 SYNCOPE, UNSPECIFIED SYNCOPE TYPE: ICD-10-CM

## 2022-01-31 DIAGNOSIS — R79.89 ELEVATED PLATELET COUNT: ICD-10-CM

## 2022-01-31 DIAGNOSIS — I10 ESSENTIAL HYPERTENSION: ICD-10-CM

## 2022-01-31 DIAGNOSIS — E11.42 TYPE 2 DIABETES MELLITUS WITH DIABETIC POLYNEUROPATHY, WITH LONG-TERM CURRENT USE OF INSULIN (HCC): ICD-10-CM

## 2022-01-31 DIAGNOSIS — Z79.4 TYPE 2 DIABETES MELLITUS WITH DIABETIC POLYNEUROPATHY, WITH LONG-TERM CURRENT USE OF INSULIN (HCC): ICD-10-CM

## 2022-01-31 LAB
ALBUMIN SERPL BCP-MCNC: 4.1 G/DL (ref 3–5.2)
ALP SERPL-CCNC: 249 U/L (ref 43–122)
ALT SERPL W P-5'-P-CCNC: 36 U/L
ANION GAP SERPL CALCULATED.3IONS-SCNC: 4 MMOL/L (ref 5–14)
AST SERPL W P-5'-P-CCNC: 27 U/L (ref 14–36)
ATRIAL RATE: 76 BPM
ATRIAL RATE: 76 BPM
BACTERIA UR QL AUTO: ABNORMAL /HPF
BASOPHILS # BLD AUTO: 0 THOUSANDS/ΜL (ref 0–0.1)
BASOPHILS NFR BLD AUTO: 1 % (ref 0–1)
BILIRUB SERPL-MCNC: 0.28 MG/DL
BILIRUB UR QL STRIP: NEGATIVE
BUN SERPL-MCNC: 19 MG/DL (ref 5–25)
CALCIUM SERPL-MCNC: 8.9 MG/DL (ref 8.4–10.2)
CARDIAC TROPONIN I PNL SERPL HS: 3 NG/L (ref 8–18)
CHLORIDE SERPL-SCNC: 98 MMOL/L (ref 97–108)
CLARITY UR: CLEAR
CO2 SERPL-SCNC: 30 MMOL/L (ref 22–30)
COLOR UR: YELLOW
CREAT SERPL-MCNC: 0.87 MG/DL (ref 0.6–1.2)
EOSINOPHIL # BLD AUTO: 0.2 THOUSAND/ΜL (ref 0–0.4)
EOSINOPHIL NFR BLD AUTO: 2 % (ref 0–6)
ERYTHROCYTE [DISTWIDTH] IN BLOOD BY AUTOMATED COUNT: 12.9 %
GFR SERPL CREATININE-BSD FRML MDRD: 69 ML/MIN/1.73SQ M
GLUCOSE SERPL-MCNC: 310 MG/DL (ref 70–99)
GLUCOSE UR STRIP-MCNC: ABNORMAL MG/DL
HCT VFR BLD AUTO: 37 % (ref 36–46)
HGB BLD-MCNC: 13 G/DL (ref 12–16)
HGB UR QL STRIP.AUTO: NEGATIVE
KETONES UR STRIP-MCNC: NEGATIVE MG/DL
LEUKOCYTE ESTERASE UR QL STRIP: 25
LYMPHOCYTES # BLD AUTO: 2.6 THOUSANDS/ΜL (ref 0.5–4)
LYMPHOCYTES NFR BLD AUTO: 34 % (ref 25–45)
MCH RBC QN AUTO: 31.5 PG (ref 26–34)
MCHC RBC AUTO-ENTMCNC: 35.1 G/DL (ref 31–36)
MCV RBC AUTO: 90 FL (ref 80–100)
MONOCYTES # BLD AUTO: 0.5 THOUSAND/ΜL (ref 0.2–0.9)
MONOCYTES NFR BLD AUTO: 6 % (ref 1–10)
NEUTROPHILS # BLD AUTO: 4.5 THOUSANDS/ΜL (ref 1.8–7.8)
NEUTS SEG NFR BLD AUTO: 58 % (ref 45–65)
NITRITE UR QL STRIP: NEGATIVE
NON-SQ EPI CELLS URNS QL MICRO: ABNORMAL /HPF
P AXIS: 20 DEGREES
P AXIS: 20 DEGREES
PH UR STRIP.AUTO: 5 [PH]
PLATELET # BLD AUTO: 355 THOUSANDS/UL (ref 150–450)
PMV BLD AUTO: 8.6 FL (ref 8.9–12.7)
POTASSIUM SERPL-SCNC: 5 MMOL/L (ref 3.6–5)
PR INTERVAL: 134 MS
PR INTERVAL: 134 MS
PROT SERPL-MCNC: 7.8 G/DL (ref 5.9–8.4)
PROT UR STRIP-MCNC: NEGATIVE MG/DL
QRS AXIS: -21 DEGREES
QRS AXIS: -21 DEGREES
QRSD INTERVAL: 68 MS
QRSD INTERVAL: 68 MS
QT INTERVAL: 396 MS
QT INTERVAL: 396 MS
QTC INTERVAL: 445 MS
QTC INTERVAL: 445 MS
RBC # BLD AUTO: 4.13 MILLION/UL (ref 4–5.2)
RBC #/AREA URNS AUTO: ABNORMAL /HPF
SODIUM SERPL-SCNC: 132 MMOL/L (ref 137–147)
SP GR UR STRIP.AUTO: 1.01 (ref 1–1.04)
T WAVE AXIS: 20 DEGREES
T WAVE AXIS: 20 DEGREES
UROBILINOGEN UA: NEGATIVE MG/DL
VENTRICULAR RATE: 76 BPM
VENTRICULAR RATE: 76 BPM
WBC # BLD AUTO: 7.7 THOUSAND/UL (ref 4.5–11)
WBC #/AREA URNS AUTO: ABNORMAL /HPF

## 2022-01-31 PROCEDURE — 84484 ASSAY OF TROPONIN QUANT: CPT

## 2022-01-31 PROCEDURE — 70450 CT HEAD/BRAIN W/O DYE: CPT

## 2022-01-31 PROCEDURE — 93010 ELECTROCARDIOGRAM REPORT: CPT

## 2022-01-31 PROCEDURE — 99215 OFFICE O/P EST HI 40 MIN: CPT | Performed by: FAMILY MEDICINE

## 2022-01-31 PROCEDURE — 93005 ELECTROCARDIOGRAM TRACING: CPT

## 2022-01-31 PROCEDURE — G1004 CDSM NDSC: HCPCS

## 2022-01-31 PROCEDURE — 85025 COMPLETE CBC W/AUTO DIFF WBC: CPT

## 2022-01-31 PROCEDURE — 36415 COLL VENOUS BLD VENIPUNCTURE: CPT

## 2022-01-31 PROCEDURE — 81001 URINALYSIS AUTO W/SCOPE: CPT | Performed by: FAMILY MEDICINE

## 2022-01-31 PROCEDURE — 71046 X-RAY EXAM CHEST 2 VIEWS: CPT

## 2022-01-31 PROCEDURE — 80053 COMPREHEN METABOLIC PANEL: CPT

## 2022-01-31 RX ORDER — AMOXICILLIN 875 MG/1
875 TABLET, COATED ORAL 2 TIMES DAILY
COMMUNITY
End: 2022-07-19 | Stop reason: ALTCHOICE

## 2022-01-31 NOTE — TELEPHONE ENCOUNTER
CMP is remarkable for sodium 132  Blood sugar 310  Most likely sodium is low because of high blood sugar advised  Advised patient to call her Endo ,Dr Kofi Hsu is today for further recommendation regarding managing her blood sugar  It was low in the morning and high during the day  renal function is abnormal   Recheck BMP in 4 days  Liver function test is abnormal   Hold Crestor  Recheck liver function test in 4 days  CBC is okay, troponin is low  UA is remarkable for leukocyte and blood sugar  Urine microscopic pending    Check urine culture  Head CT scan okay  Chest x-ray okay  EKG remarkable for minimal voltage criteria for left ventricular hypertrophy when compared to EKG done August 2019 no significant change  Check EEG and Holter monitor

## 2022-01-31 NOTE — PROGRESS NOTES
Virtual Regular Visit    Verification of patient location:    Patient is located in the following state in which I hold an active license [place]      Assessment/Plan:    Problem List Items Addressed This Visit        Endocrine    Uncontrolled insulin-dependent diabetes mellitus with hyperglycemia       Cardiovascular and Mediastinum    Essential hypertension      Other Visit Diagnoses     Syncope, unspecified syncope type    -  Primary    Recommend referral to the ER  Patient declined  advised if recur to go to the ER    Relevant Orders    CBC and differential (Completed)    Comprehensive metabolic panel (Completed)    ECG 12 lead    XR chest pa & lateral (Completed)    High Sensitivity Troponin I Random (Completed)    UA (URINE) with reflex to Scope (Completed)    CT head wo contrast (Completed)    Urine Microscopic (Completed)    Disoriented        recommend to go to Er   pt declined   advise pt If recur to go to the ER    Relevant Orders    CBC and differential (Completed)    Comprehensive metabolic panel (Completed)    ECG 12 lead    UA (URINE) with reflex to Scope (Completed)    CT head wo contrast (Completed)    Urine Microscopic (Completed)        Diagnoses and all orders for this visit:    Syncope, unspecified syncope type  Comments:  Recommend referral to the ER  Patient declined  advised if recur to go to the ER  Orders:  -     CBC and differential; Future  -     Comprehensive metabolic panel; Future  -     ECG 12 lead; Future  -     XR chest pa & lateral; Future  -     High Sensitivity Troponin I Random; Future  -     UA (URINE) with reflex to Scope  -     Cancel: CT head wo contrast; Future  -     CT head wo contrast; Future  -     Urine Microscopic    Disoriented  Comments:  recommend to go to Er   pt declined   advise pt If recur to go to the ER  Orders:  -     CBC and differential; Future  -     Comprehensive metabolic panel; Future  -     ECG 12 lead;  Future  -     UA (URINE) with reflex to Scope  - Cancel: CT head wo contrast; Future  -     CT head wo contrast; Future  -     Urine Microscopic    Type 2 diabetes mellitus with diabetic polyneuropathy, with long-term current use of insulin (HCC)  Comments: Following with Endocrinology  Advised to contact Endocrinology for better management of her blood sugar    Essential hypertension  Comments:   okay control, to follow with the dash diet ,    Other orders  -     amoxicillin (AMOXIL) 875 mg tablet; Take 875 mg by mouth 2 (two) times a day (Patient not taking: Reported on 1/31/2022 )           Reason for visit is   Chief Complaint   Patient presents with    Virtual Regular Visit        Encounter provider Stacy Terry MD    Provider located at [address] [place] [address]  [facility]  [address] [address] [address] [phone]      Recent Visits  Date Type Provider Dept   01/31/22 Telephone Stacy Terry MD [address] [place] [address] Primary Care   01/31/22 Telephone Stacy Terry MD [facility] Primary Care   01/31/22 Telemedicine Stacy Terry MD [facility] Primary Care   Showing recent visits within past 7 days and meeting all other requirements  Future Appointments  No visits were found meeting these conditions  Showing future appointments within next 150 days and meeting all other requirements       The patient was identified by name and date of birth  Ct Elizabeth was informed that this is a telemedicine visit and that the visit is being conducted through [address] [place] [address] Now and patient was informed that this is a secure, HIPAA-compliant platform  She agrees to proceed     My office door was closed  No one else was in the room  She acknowledged consent and understanding of privacy and security of the video platform  The patient has agreed to participate and understands they can discontinue the visit at any time  Patient is aware this is a billable service       Subjective  Ct Elizabeth is a 77 y o  female       HPI   Syncope, patient a a p m  She was in bed watching TV  Her hospital called her to check a later  He came to the all and after she got a walked a she start feeling faint she did go to the floor her all and then she passed out she did not hit her head  Her  stated she passed out about 22nd  And when she woke up she was disoriented for half an hour denied for any symptoms before denied chest pain, palpitation, seizure-like activity, patient check blood sugar at that time it was 286  Blood pressure 154/72  And she returned to normal after that denied loss of vision, slurred speech, weakness or numbness  And she had been feeling fine  Denied any known exposure to anybody with COVID  Diabetes  Fasting blood sugar this morning was 54  Now after she ate is 108  Hypertension  Patient denied headache  Past Medical History:   Diagnosis Date    Absent pulse     DP pulse B/L    Depression     Diabetes (HCC)     HLD (hyperlipidemia)     Neuropathy     Osteopenia     Prominence of large joints     (L) SC    Weight loss        Past Surgical History:   Procedure Laterality Date    EYE SURGERY  2018    TRIGGER FINGER RELEASE Left 03/12/2021       Current Outpatient Medications   Medication Sig Dispense Refill    ergocalciferol (VITAMIN D2) 50,000 units Take 50,000 Units by mouth      insulin lispro (HumaLOG) 100 units/mL injection Inject 30 Units under the skin      loratadine (CLARITIN) 10 mg tablet Take 1 tablet (10 mg total) by mouth daily 30 tablet 0    olmesartan (BENICAR) 20 mg tablet TAKE 1 TABLET BY MOUTH EVERY DAY 90 tablet 1    omega-3-acid ethyl esters (LOVAZA) 1 g capsule Take 2 g by mouth 2 (two) times a day        rosuvastatin (CRESTOR) 40 MG tablet Take 40 mg by mouth daily      Trulicity 1 5 MB/9 6MQ SOPN INJECT 1 5 MG UNDER THE SKIN EVERY 7 DAYS        acetaminophen (TYLENOL) 500 mg tablet every 8 (eight) hours (Patient not taking: Reported on 1/31/2022 )      amoxicillin (AMOXIL) 875 mg tablet Take 875 mg by mouth 2 (two) times a day (Patient not taking: Reported on 1/31/2022 )      atorvastatin (LIPITOR) 80 mg tablet Take 1 tablet (80 mg total) by mouth daily with dinner (Patient not taking: Reported on 5/5/2021) 30 tablet 0    cholecalciferol (VITAMIN D3) 1,000 units tablet Take 1,000 Units by mouth daily (Patient not taking: Reported on 1/31/2022 )      Difluprednate (Durezol) 0 05 % EMUL Durezol 0 05 % eye drops (Patient not taking: Reported on 1/31/2022)      gabapentin (NEURONTIN) 300 mg capsule Take 1 capsule (300 mg total) by mouth 3 (three) times a day (Patient not taking: Reported on 1/31/2022 ) 90 capsule 0    insulin aspart (NOVOLOG FLEXPEN) 100 Units/mL injection pen Inject 10 Units under the skin 3 (three) times a day with meals for 30 days (Patient not taking: Reported on 3/23/2021) 3 pen 0    insulin glargine (BASAGLAR KWIKPEN) 100 units/mL injection pen Inject 30 Units under the skin daily for 30 days 3 pen 0     No current facility-administered medications for this visit  Allergies   Allergen Reactions    Oxycodone Other (See Comments)     fatigue       Review of Systems   Constitutional: Negative for activity change, appetite change, chills, fatigue, fever and unexpected weight change  HENT: Negative for congestion, ear discharge, ear pain, hearing loss, nosebleeds, rhinorrhea, sinus pressure, sore throat, tinnitus, trouble swallowing and voice change  Eyes: Negative for photophobia, pain and visual disturbance  Respiratory: Negative for cough, chest tightness, shortness of breath and wheezing  Cardiovascular: Negative for chest pain, palpitations and leg swelling  Gastrointestinal: Negative for abdominal pain, anal bleeding, blood in stool, constipation, diarrhea, nausea and vomiting  Endocrine: Negative for cold intolerance, heat intolerance, polydipsia and polyuria  Genitourinary: Negative for dysuria, frequency, hematuria and urgency     Musculoskeletal: Negative for arthralgias, back pain, gait problem, joint swelling, myalgias and neck pain  Skin: Negative for rash  Neurological: Negative for tremors, seizures, syncope, speech difficulty, weakness and headaches  Hematological: Negative for adenopathy  Does not bruise/bleed easily  Psychiatric/Behavioral: Negative for agitation, behavioral problems, confusion, dysphoric mood, hallucinations and sleep disturbance  The patient is not nervous/anxious  Video Exam    Vitals:    01/31/22 1158   BP: 144/66   Temp: 98 4 °F (36 9 °C)       Physical Exam  Constitutional:       General: She is not in acute distress  Appearance: Normal appearance  She is well-developed  She is not ill-appearing, toxic-appearing or diaphoretic  Eyes:      General: No scleral icterus  Right eye: No discharge  Left eye: No discharge  Extraocular Movements: Extraocular movements intact  Neck:      Vascular: No JVD  Pulmonary:      Effort: Pulmonary effort is normal       Comments: No abnormal audible breath sounds  Abdominal:      Palpations: Abdomen is soft  Comments: per patient   Musculoskeletal:      Cervical back: Neck supple  Skin:     Coloration: Skin is not pale  Findings: No rash  Neurological:      Mental Status: She is alert and oriented to person, place, and time  Cranial Nerves: No cranial nerve deficit  Sensory: No sensory deficit  Motor: No weakness  Coordination: Coordination normal       Gait: Gait normal       Comments: Romberg is negative  Daughter helped a checking her sensation and her extremities strength she said it was normal feeling in both side of her body and normal strength of upper and lower extremities   Psychiatric:         Mood and Affect: Mood normal          Behavior: Behavior normal          Thought Content:  Thought content normal          Judgment: Judgment normal       Comments: Smiling          I spent 30 minutes with patient today in which greater than 50% of the time was spent in counseling/coordination of care regarding [address] verbally agrees to participate in [employer]  Pt is aware that [employer] could be limited without vital signs or the ability to perform a full hands-on physical Iesha David understands she or the provider may request at any time to terminate the video visit and request the patient to seek care or treatment in person

## 2022-01-31 NOTE — TELEPHONE ENCOUNTER
I called pt regarding her test results  No answer  Tests are remarkable remarkable for high blood sugar 310  Recommend to check blood sugar , to make make sure is less ,  To call endo to manage her diabetes better  Also advised her function test is abnormal need to recheck the hold Crestor for now  Advised patient to call tomorrow follow-up and further treatment

## 2022-02-16 ENCOUNTER — RA CDI HCC (OUTPATIENT)
Dept: OTHER | Facility: HOSPITAL | Age: 67
End: 2022-02-16

## 2022-02-16 PROBLEM — E11.42 TYPE 2 DIABETES MELLITUS WITH POLYNEUROPATHY (HCC): Status: ACTIVE | Noted: 2022-02-16

## 2022-02-16 NOTE — PROGRESS NOTES
[facility]  coding opportunities          Number of diagnosis code(s) already on the problem list added to FYI fla  e11 42     Chart Reviewed * (Number of) Inbasket suggestions sent to Provider: 2  E11 22  e11 65                  Patients insurance company: [employer] ([employer])

## 2022-03-03 ENCOUNTER — OFFICE VISIT (OUTPATIENT)
Dept: FAMILY MEDICINE CLINIC | Facility: CLINIC | Age: 67
End: 2022-03-03
Payer: COMMERCIAL

## 2022-03-03 VITALS
BODY MASS INDEX: 29.43 KG/M2 | HEART RATE: 80 BPM | SYSTOLIC BLOOD PRESSURE: 132 MMHG | HEIGHT: 59 IN | WEIGHT: 146 LBS | TEMPERATURE: 97.2 F | RESPIRATION RATE: 16 BRPM | DIASTOLIC BLOOD PRESSURE: 66 MMHG | OXYGEN SATURATION: 97 %

## 2022-03-03 DIAGNOSIS — R55 SYNCOPE, UNSPECIFIED SYNCOPE TYPE: Primary | ICD-10-CM

## 2022-03-03 DIAGNOSIS — R79.89 ABNORMAL LIVER FUNCTION TEST: ICD-10-CM

## 2022-03-03 DIAGNOSIS — N28.9 ABNORMAL RENAL FUNCTION: ICD-10-CM

## 2022-03-03 DIAGNOSIS — R82.81 PYURIA: ICD-10-CM

## 2022-03-03 DIAGNOSIS — Z79.4 TYPE 2 DIABETES MELLITUS WITH DIABETIC POLYNEUROPATHY, WITH LONG-TERM CURRENT USE OF INSULIN (HCC): ICD-10-CM

## 2022-03-03 DIAGNOSIS — R09.89 ABSENT PEDAL PULSES: ICD-10-CM

## 2022-03-03 DIAGNOSIS — R74.8 ALKALINE PHOSPHATASE ELEVATION: ICD-10-CM

## 2022-03-03 DIAGNOSIS — D72.820 ELEVATED LYMPHOCYTES: ICD-10-CM

## 2022-03-03 DIAGNOSIS — R79.89 LOW VITAMIN D LEVEL: ICD-10-CM

## 2022-03-03 DIAGNOSIS — R94.31 ABNORMAL EKG: ICD-10-CM

## 2022-03-03 DIAGNOSIS — E11.42 TYPE 2 DIABETES MELLITUS WITH DIABETIC POLYNEUROPATHY, WITH LONG-TERM CURRENT USE OF INSULIN (HCC): ICD-10-CM

## 2022-03-03 DIAGNOSIS — F17.200 SMOKING: ICD-10-CM

## 2022-03-03 PROCEDURE — 1160F RVW MEDS BY RX/DR IN RCRD: CPT | Performed by: FAMILY MEDICINE

## 2022-03-03 PROCEDURE — 3008F BODY MASS INDEX DOCD: CPT | Performed by: FAMILY MEDICINE

## 2022-03-03 PROCEDURE — 3075F SYST BP GE 130 - 139MM HG: CPT | Performed by: FAMILY MEDICINE

## 2022-03-03 PROCEDURE — 3078F DIAST BP <80 MM HG: CPT | Performed by: FAMILY MEDICINE

## 2022-03-03 PROCEDURE — 99214 OFFICE O/P EST MOD 30 MIN: CPT | Performed by: FAMILY MEDICINE

## 2022-03-03 NOTE — PROGRESS NOTES
Assessment/Plan:       No problem-specific Assessment & Plan notes found for this encounter  Diagnoses and all orders for this visit:    Syncope, unspecified syncope type  Comments:  No recurrence  Recommend to check  holter monitor to rule out arrhythmia  Patient declined    Smoking  Comments:  Advised patient to stop smoking  Abnormal EKG  -     Echo complete w/ contrast if indicated; Future    Low vitamin D level  Comments:  Not well compensated she is following with Dr Gerda Arroyo  Take vitamin-D as directed    Alkaline phosphatase elevation  -     Alkaline phosphatase, isoenzymes; Future    Elevated lymphocytes  -     CBC and differential; Future    Type 2 diabetes mellitus with diabetic polyneuropathy, with long-term current use of insulin (HCC)  Comments: To follow with 1800 calorie diet  Patient is following with Endocrinology    Abnormal renal function  Comments:  Avoid NSAID  Hydrate self well  Orders:  -     Basic metabolic panel; Future    Abnormal liver function test  Comments:  Corrected except alkaline phosphatase still abnormal    Pyuria  -     Urine culture; Future  -     UA (URINE) with reflex to Scope    Absent pedal pulses  Comments:  left DP  Orders:  -     VAS lower limb arterial duplex, complete bilateral; Future        Patient Instructions   Follow up with test results      Orders Placed This Encounter   Procedures    Urine culture     Standing Status:   Future     Standing Expiration Date:   3/3/2023    CBC and differential     This is a patient instruction: This test is non-fasting  Please drink two glasses of water morning of bloodwork  Standing Status:   Future     Standing Expiration Date:   3/3/2023    Alkaline phosphatase, isoenzymes     Standing Status:   Future     Standing Expiration Date:   3/3/2023    Basic metabolic panel     This is a patient instruction: Patient fasting for 8 hours or longer recommended       Standing Status:   Future     Standing Expiration Date:   3/3/2023    UA (URINE) with reflex to Scope    Echo complete w/ contrast if indicated     Standing Status:   Future     Standing Expiration Date:   3/3/2026     Scheduling Instructions: There is no prep required  Please bring your insurance cards, a form of photo ID and a list of your medications with you  Arrive 15 minutes prior to your appointment time in order to register  To schedule this appointment, please contact Central Scheduling at [phone]  Subjective:     Patient ID: [id number] Johnson is a 77 y o  female      HPI  Patient is here for follow-up  Syncope  Patient stated she has no further syncope  Denied headache, chest pain, palpitation, or seizure   Diabetes  Patient is not checking blood sugar at home  Following with Endocrinology  Denied polyuria or polydipsia  Denied the claudication  Low vitamin-D  Denied depression change in the skin or hair  Smoking , she smokes 4-5 cig a day  Had flu shot at the pharmacy about 1 or 2 months ago      Test results  CT scan of the head  Chest x-ray  EKG   labs January 31st and February 1, 2022  Discussed result with patient    Review of Systems   Constitutional: Negative for activity change, appetite change, chills, diaphoresis and fatigue  HENT: Negative for ear pain, sore throat, trouble swallowing and voice change  Eyes: Negative for itching and visual disturbance  Respiratory: Negative for cough, chest tightness and shortness of breath  Cardiovascular: Negative for chest pain, palpitations and leg swelling  Gastrointestinal: Negative for abdominal pain, blood in stool, constipation, diarrhea and nausea  Endocrine: Negative for polydipsia and polyuria  Genitourinary: Negative for dysuria, flank pain, frequency, hematuria, pelvic pain, urgency, vaginal bleeding and vaginal discharge  Musculoskeletal: Negative for arthralgias, back pain, gait problem, myalgias and neck pain     Skin: Negative for rash  Allergic/Immunologic: Negative for food allergies  Neurological: Negative for dizziness, tremors, seizures, syncope, facial asymmetry, speech difficulty, weakness, light-headedness, numbness and headaches  Hematological: Negative for adenopathy  Does not bruise/bleed easily  Psychiatric/Behavioral: Negative for confusion and dysphoric mood  The patient is not nervous/anxious  Objective:     Physical Exam  Constitutional:       General: She is not in acute distress  Appearance: Normal appearance  She is well-developed  She is not ill-appearing  HENT:      Head: Normocephalic and atraumatic  Eyes:      General: No scleral icterus  Conjunctiva/sclera: Conjunctivae normal       Pupils: Pupils are equal, round, and reactive to light  Neck:      Thyroid: No thyromegaly  Vascular: No JVD  Cardiovascular:      Rate and Rhythm: Normal rate and regular rhythm  Pulses: Pulses are weak  Carotid pulses are 3+ on the right side and 3+ on the left side  Dorsalis pedis pulses are 2+ on the right side and 0 on the left side  Posterior tibial pulses are 2+ on the right side and 2+ on the left side  Heart sounds: Normal heart sounds  No murmur heard  Comments: Extremities  No edema  Pulmonary:      Effort: Pulmonary effort is normal       Breath sounds: Normal breath sounds  Abdominal:      General: Bowel sounds are normal  There is no distension  Palpations: Abdomen is soft  There is no mass  Tenderness: There is no abdominal tenderness  There is no guarding or rebound  Hernia: No hernia is present  Musculoskeletal:      Cervical back: Neck supple  Right lower leg: No edema  Left lower leg: No edema  Feet:      Right foot:      Skin integrity: No ulcer, skin breakdown, erythema, warmth, callus or dry skin  Left foot:      Skin integrity: No ulcer, skin breakdown, erythema, warmth, callus or dry skin  Lymphadenopathy:      Cervical: No cervical adenopathy  Skin:     Coloration: Skin is not pale  Findings: No rash  Neurological:      General: No focal deficit present  Mental Status: She is alert and oriented to person, place, and time  Cranial Nerves: No cranial nerve deficit  Sensory: No sensory deficit  Motor: No weakness or abnormal muscle tone  Coordination: Coordination normal       Gait: Gait normal       Deep Tendon Reflexes: Reflexes normal       Comments: Negative romberg  negative babinisky   Psychiatric:         Mood and Affect: Mood normal          Behavior: Behavior normal          Thought Content: Thought content normal          Judgment: Judgment normal      BMI Counseling: Body mass index is 29 49 kg/m²  The BMI is above normal  Nutrition recommendations include decreasing portion sizes  Exercise recommendations include moderate physical activity 150 minutes/week  No pharmacotherapy was ordered  Patient referred to PCP  Rationale for BMI follow-up plan is due to patient being overweight or obese  Diabetic Foot Exam    Patient's shoes and socks removed  Right Foot/Ankle   Right Foot Inspection  Skin Exam: skin normal and skin intact  No dry skin, no warmth, no callus, no erythema, no maceration, no abnormal color, no pre-ulcer, no ulcer and no callus  Toe Exam: No swelling, no tenderness, erythema and  no right toe deformity    Sensory   Monofilament testing: intact    Vascular  The right DP pulse is 2+  The right PT pulse is 2+  Left Foot/Ankle  Left Foot Inspection  Skin Exam: skin normal and skin intact  No dry skin, no warmth, no erythema, no maceration, normal color, no pre-ulcer, no ulcer and no callus  Toe Exam: No swelling, no tenderness, no erythema and no left toe deformity  Sensory   Monofilament testing: intact    Vascular  The left DP pulse is 0  The left PT pulse is 2+       Assign Risk Category  No deformity present  No loss of protective sensation  Weak pulses  Risk: 0

## 2022-03-05 PROBLEM — R55 SYNCOPE: Status: ACTIVE | Noted: 2022-03-05

## 2022-03-09 ENCOUNTER — HOSPITAL ENCOUNTER (OUTPATIENT)
Dept: MAMMOGRAPHY | Facility: CLINIC | Age: 67
Discharge: HOME | End: 2022-03-09
Payer: COMMERCIAL

## 2022-03-09 VITALS — WEIGHT: 146 LBS | BODY MASS INDEX: 29.43 KG/M2 | HEIGHT: 59 IN

## 2022-03-09 DIAGNOSIS — Z12.31 ENCOUNTER FOR SCREENING MAMMOGRAM FOR MALIGNANT NEOPLASM OF BREAST: ICD-10-CM

## 2022-03-09 PROCEDURE — 77067 SCR MAMMO BI INCL CAD: CPT

## 2022-03-09 PROCEDURE — 77063 BREAST TOMOSYNTHESIS BI: CPT

## 2022-03-14 ENCOUNTER — HOSPITAL ENCOUNTER (OUTPATIENT)
Dept: NON INVASIVE DIAGNOSTICS | Facility: HOSPITAL | Age: 67
Discharge: HOME | End: 2022-03-14
Attending: FAMILY MEDICINE
Payer: COMMERCIAL

## 2022-03-14 DIAGNOSIS — R09.89 ABSENT PEDAL PULSES: ICD-10-CM

## 2022-03-14 PROCEDURE — 93925 LOWER EXTREMITY STUDY: CPT | Performed by: SURGERY

## 2022-03-14 PROCEDURE — 93922 UPR/L XTREMITY ART 2 LEVELS: CPT | Performed by: SURGERY

## 2022-03-14 PROCEDURE — 93923 UPR/LXTR ART STDY 3+ LVLS: CPT

## 2022-03-14 PROCEDURE — 93925 LOWER EXTREMITY STUDY: CPT

## 2022-03-17 DIAGNOSIS — I70.202 FEMORAL ARTERY STENOSIS, LEFT (HCC): Primary | ICD-10-CM

## 2022-03-17 DIAGNOSIS — I99.9 VASCULAR DISEASE: ICD-10-CM

## 2022-03-17 LAB
LEFT EYE DIABETIC RETINOPATHY: NORMAL
RIGHT EYE DIABETIC RETINOPATHY: NORMAL
SEVERITY (EYE EXAM): NORMAL

## 2022-03-17 PROCEDURE — 2023F DILAT RTA XM W/O RTNOPTHY: CPT | Performed by: FAMILY MEDICINE

## 2022-03-23 ENCOUNTER — TELEPHONE (OUTPATIENT)
Dept: ADMINISTRATIVE | Facility: OTHER | Age: 67
End: 2022-03-23

## 2022-03-23 NOTE — TELEPHONE ENCOUNTER
----- Message from Baron Macrina MA sent at 3/22/2022 12:51 PM EDT -----  Regarding: care gap request  03/22/22 12:51 PM    Hello, our patient attached above has had Diabetic Eye Exam completed/performed  Please assist in updating the patient chart by pulling the document from the Media Tab  The date of service is 03/17/2022       Thank you,  Baron Macrina MA   [facility]

## 2022-03-23 NOTE — TELEPHONE ENCOUNTER
Upon review of the In Basket request we were able to locate, review, and update the patient chart as requested for Diabetic Eye Exam     Any additional questions or concerns should be emailed to the Practice Liaisons via [email or web address] email, please do not reply via In Basket      Thank you  Daryn Anglin MA

## 2022-03-29 ENCOUNTER — TELEPHONE (OUTPATIENT)
Dept: FAMILY MEDICINE CLINIC | Facility: CLINIC | Age: 67
End: 2022-03-29

## 2022-03-29 NOTE — TELEPHONE ENCOUNTER
Spoke to patient and advised that the insurance is asking for us to send information on a holter monitor, patient said that she never had a holter monitor done before  Insurance will not cover the echo since the holter monitor has not been done   Please advise if you would like for patient to follow up with cardiology for her   abnormal ekg

## 2022-03-30 ENCOUNTER — RA CDI HCC (OUTPATIENT)
Dept: OTHER | Facility: HOSPITAL | Age: 67
End: 2022-03-30

## 2022-03-30 NOTE — PROGRESS NOTES
[facility] [address]  coding opportunities          Chart Reviewed number of suggestions sent to Provider: 3  E11 42  E11 22  e11 65     Patients Insurance     Medicare Insurance: [employer]

## 2022-03-30 NOTE — TELEPHONE ENCOUNTER
Patient has an office a in 1 week  We can discuss her test further a for her to Cardiology if she in agreement    Advised patient to keep her office visit

## 2022-04-01 ENCOUNTER — HOSPITAL ENCOUNTER (OUTPATIENT)
Dept: BONE DENSITY | Facility: MEDICAL CENTER | Age: 67
Discharge: HOME | End: 2022-04-01
Payer: COMMERCIAL

## 2022-04-01 DIAGNOSIS — Z13.820 ENCOUNTER FOR SCREENING FOR OSTEOPOROSIS: ICD-10-CM

## 2022-04-01 DIAGNOSIS — Z78.0 MENOPAUSE: ICD-10-CM

## 2022-04-01 PROCEDURE — 77080 DXA BONE DENSITY AXIAL: CPT

## 2022-04-12 ENCOUNTER — APPOINTMENT (OUTPATIENT)
Dept: LAB | Age: 67
End: 2022-04-12
Payer: COMMERCIAL

## 2022-04-12 DIAGNOSIS — M81.0 OSTEOPOROSIS WITHOUT CURRENT PATHOLOGICAL FRACTURE, UNSPECIFIED OSTEOPOROSIS TYPE: ICD-10-CM

## 2022-04-12 LAB
25(OH)D3 SERPL-MCNC: 40.9 NG/ML (ref 30–100)
ALBUMIN SERPL BCP-MCNC: 3.3 G/DL (ref 3.5–5)
ALP SERPL-CCNC: 95 U/L (ref 46–116)
ALT SERPL W P-5'-P-CCNC: 22 U/L (ref 12–78)
ANION GAP SERPL CALCULATED.3IONS-SCNC: 4 MMOL/L (ref 4–13)
AST SERPL W P-5'-P-CCNC: 14 U/L (ref 5–45)
BILIRUB SERPL-MCNC: 0.25 MG/DL (ref 0.2–1)
BUN SERPL-MCNC: 12 MG/DL (ref 5–25)
CALCIUM ALBUM COR SERPL-MCNC: 10.1 MG/DL (ref 8.3–10.1)
CALCIUM SERPL-MCNC: 9.5 MG/DL (ref 8.3–10.1)
CHLORIDE SERPL-SCNC: 108 MMOL/L (ref 100–108)
CO2 SERPL-SCNC: 28 MMOL/L (ref 21–32)
CREAT SERPL-MCNC: 0.75 MG/DL (ref 0.6–1.3)
GFR SERPL CREATININE-BSD FRML MDRD: 83 ML/MIN/1.73SQ M
GLUCOSE SERPL-MCNC: 132 MG/DL (ref 65–140)
POTASSIUM SERPL-SCNC: 4.7 MMOL/L (ref 3.5–5.3)
PROT SERPL-MCNC: 7.2 G/DL (ref 6.4–8.2)
PTH-INTACT SERPL-MCNC: 37.1 PG/ML (ref 18.4–80.1)
SODIUM SERPL-SCNC: 140 MMOL/L (ref 136–145)

## 2022-04-12 PROCEDURE — 82306 VITAMIN D 25 HYDROXY: CPT

## 2022-04-12 PROCEDURE — 83970 ASSAY OF PARATHORMONE: CPT

## 2022-04-12 PROCEDURE — 80053 COMPREHEN METABOLIC PANEL: CPT

## 2022-04-12 PROCEDURE — 36415 COLL VENOUS BLD VENIPUNCTURE: CPT

## 2022-04-13 ENCOUNTER — VBI (OUTPATIENT)
Dept: ADMINISTRATIVE | Facility: OTHER | Age: 67
End: 2022-04-13

## 2022-04-20 ENCOUNTER — ANNUAL EXAM (OUTPATIENT)
Dept: GYNECOLOGY | Facility: CLINIC | Age: 67
End: 2022-04-20
Payer: COMMERCIAL

## 2022-04-20 VITALS
BODY MASS INDEX: 29.64 KG/M2 | DIASTOLIC BLOOD PRESSURE: 78 MMHG | HEART RATE: 81 BPM | SYSTOLIC BLOOD PRESSURE: 144 MMHG | HEIGHT: 59 IN | WEIGHT: 147 LBS

## 2022-04-20 DIAGNOSIS — N95.2 ATROPHIC VAGINITIS: ICD-10-CM

## 2022-04-20 DIAGNOSIS — M81.0 AGE-RELATED OSTEOPOROSIS WITHOUT CURRENT PATHOLOGICAL FRACTURE: ICD-10-CM

## 2022-04-20 DIAGNOSIS — Z12.4 ENCOUNTER FOR PAPANICOLAOU SMEAR FOR CERVICAL CANCER SCREENING: Primary | ICD-10-CM

## 2022-04-20 DIAGNOSIS — Z01.419 ENCOUNTER FOR GYNECOLOGICAL EXAMINATION WITHOUT ABNORMAL FINDING: ICD-10-CM

## 2022-04-20 PROCEDURE — 3008F BODY MASS INDEX DOCD: CPT | Performed by: FAMILY MEDICINE

## 2022-04-20 PROCEDURE — G0101 CA SCREEN;PELVIC/BREAST EXAM: HCPCS | Performed by: OBSTETRICS & GYNECOLOGY

## 2022-04-20 NOTE — PROGRESS NOTES
Assessment/Plan:         Diagnoses and all orders for this visit:    Encounter for Papanicolaou smear for cervical cancer screening  -     Liquid-based pap, screening    Encounter for gynecological examination without abnormal finding    Age-related osteoporosis without current pathological fracture; start Prolia    Atrophic vaginitis; asymptomatic-follow        Subjective:      Patient ID: Willia Pallas is a 77 y o  female  HPI  patient presents for for annual examination  She offers no complaints  She denies any vaginal irritation, burning, discharge or bleeding  Denies any dysuria, hematuria urgency or urinary incontinence  No GI complaints  Colon cancer screening via colo guard 2020: Negative    DEXA scan April 2022  Osteoporosis of the left femoral neck with osteopenia of the lumbar spine and the hip  She had previously been on Boniva but discontinued this secondary to her forgetting to take this on a monthly basis  She would prefer switching to Prolia    The following portions of the patient's history were reviewed and updated as appropriate:   She  has a past medical history of Absent pulse, Depression, Diabetes ([address] ), HLD (hyperlipidemia), Neuropathy, Osteopenia, Prominence of large joints, and Weight loss    She   Patient Active Problem List    Diagnosis Date Noted    Syncope 03/05/2022    Type 2 diabetes mellitus with polyneuropathy ([address] ) 02/16/2022    Head contusion 11/24/2021    Decreased pedal pulses 11/24/2021    Essential hypertension 11/24/2021    Elevated platelet count 52/11/0313    Low vitamin D level 11/24/2021    Acute maxillary sinusitis 11/24/2021    Osteoporosis without current pathological fracture 11/24/2021    Immunization due 11/24/2021    Medicare welcome exam 11/24/2021    HIV screening declined 05/06/2021    Leukocytosis 08/20/2019    Acute bronchitis 08/19/2019    Diaphoresis 08/19/2019    HLD (hyperlipidemia) 08/19/2019    Tobacco abuse 08/19/2019    Elevated blood pressure reading 08/19/2019    Osteopenia     Uncontrolled insulin-dependent diabetes mellitus with hyperglycemia     Depression      She  has a past surgical history that includes Eye surgery (2018) and Trigger finger release (Left, 03/12/2021)  Her family history includes Alzheimer's disease in her mother; Diabetes in her sister; Heart attack in her father; No Known Problems in her daughter, daughter, maternal grandmother, paternal grandmother, sister, sister, sister, sister, sister, and sister  She  reports that she has been smoking cigarettes  She has a 10 00 pack-year smoking history  She has never used smokeless tobacco  She reports previous alcohol use  She reports that she does not use drugs    Current Outpatient Medications   Medication Sig Dispense Refill    ergocalciferol (VITAMIN D2) 50,000 units Take 50,000 Units by mouth      gabapentin (NEURONTIN) 300 mg capsule Take 1 capsule (300 mg total) by mouth 3 (three) times a day 90 capsule 0    insulin glargine (BASAGLAR KWIKPEN) 100 units/mL injection pen Inject 30 Units under the skin daily for 30 days 3 pen 0    insulin lispro (HumaLOG) 100 units/mL injection Inject 30 Units under the skin      omega-3-acid ethyl esters (LOVAZA) 1 g capsule Take 2 g by mouth 2 (two) times a day        acetaminophen (TYLENOL) 500 mg tablet every 8 (eight) hours (Patient not taking: Reported on 1/31/2022 )      amoxicillin (AMOXIL) 875 mg tablet Take 875 mg by mouth 2 (two) times a day (Patient not taking: Reported on 1/31/2022 )      atorvastatin (LIPITOR) 80 mg tablet Take 1 tablet (80 mg total) by mouth daily with dinner (Patient not taking: Reported on 5/5/2021) 30 tablet 0    cholecalciferol (VITAMIN D3) 1,000 units tablet Take 1,000 Units by mouth daily (Patient not taking: Reported on 1/31/2022 )      Difluprednate (Durezol) 0 05 % EMUL Durezol 0 05 % eye drops (Patient not taking: Reported on 1/31/2022)      insulin aspart (Myra Levy FLEXPEN) 100 Units/mL injection pen Inject 10 Units under the skin 3 (three) times a day with meals for 30 days (Patient not taking: Reported on 3/23/2021) 3 pen 0    loratadine (CLARITIN) 10 mg tablet Take 1 tablet (10 mg total) by mouth daily (Patient not taking: Reported on 4/20/2022 ) 30 tablet 0    olmesartan (BENICAR) 20 mg tablet TAKE 1 TABLET BY MOUTH EVERY DAY (Patient not taking: Reported on 4/20/2022) 90 tablet 1    rosuvastatin (CRESTOR) 40 MG tablet Take 40 mg by mouth daily (Patient not taking: Reported on 3/71/4376 )      Trulicity 1 5 PF/5 1XW SOPN INJECT 1 5 MG UNDER THE SKIN EVERY 7 DAYS  (Patient not taking: Reported on 4/20/2022)       No current facility-administered medications for this visit       Current Outpatient Medications on File Prior to Visit   Medication Sig    ergocalciferol (VITAMIN D2) 50,000 units Take 50,000 Units by mouth    gabapentin (NEURONTIN) 300 mg capsule Take 1 capsule (300 mg total) by mouth 3 (three) times a day    insulin glargine (BASAGLAR KWIKPEN) 100 units/mL injection pen Inject 30 Units under the skin daily for 30 days    insulin lispro (HumaLOG) 100 units/mL injection Inject 30 Units under the skin    omega-3-acid ethyl esters (LOVAZA) 1 g capsule Take 2 g by mouth 2 (two) times a day      acetaminophen (TYLENOL) 500 mg tablet every 8 (eight) hours (Patient not taking: Reported on 1/31/2022 )    amoxicillin (AMOXIL) 875 mg tablet Take 875 mg by mouth 2 (two) times a day (Patient not taking: Reported on 1/31/2022 )    atorvastatin (LIPITOR) 80 mg tablet Take 1 tablet (80 mg total) by mouth daily with dinner (Patient not taking: Reported on 5/5/2021)    cholecalciferol (VITAMIN D3) 1,000 units tablet Take 1,000 Units by mouth daily (Patient not taking: Reported on 1/31/2022 )    Difluprednate (Durezol) 0 05 % EMUL Durezol 0 05 % eye drops (Patient not taking: Reported on 1/31/2022)    insulin aspart (NOVOLOG FLEXPEN) 100 Units/mL injection pen Inject 10 Units under the skin 3 (three) times a day with meals for 30 days (Patient not taking: Reported on 3/23/2021)    loratadine (CLARITIN) 10 mg tablet Take 1 tablet (10 mg total) by mouth daily (Patient not taking: Reported on 4/20/2022 )    olmesartan (BENICAR) 20 mg tablet TAKE 1 TABLET BY MOUTH EVERY DAY (Patient not taking: Reported on 4/20/2022)    rosuvastatin (CRESTOR) 40 MG tablet Take 40 mg by mouth daily (Patient not taking: Reported on 5/79/8792 )    Trulicity 1 5 KS/6 0VA SOPN INJECT 1 5 MG UNDER THE SKIN EVERY 7 DAYS  (Patient not taking: Reported on 4/20/2022)     No current facility-administered medications on file prior to visit  She is allergic to oxycodone       Review of Systems   Constitutional: Negative  HENT: Negative for sore throat and trouble swallowing  Gastrointestinal: Negative  Genitourinary: Negative  Objective:      /78   Pulse 81   Ht 4' 11" (1 499 m)   Wt 66 7 kg (147 lb)   BMI 29 69 kg/m²          Physical Exam  Vitals reviewed  Cardiovascular:      Rate and Rhythm: Normal rate and regular rhythm  Pulses: Normal pulses  Heart sounds: Normal heart sounds  Pulmonary:      Effort: Pulmonary effort is normal       Breath sounds: Normal breath sounds  Chest:   Breasts:      Right: No swelling, bleeding, inverted nipple, mass, nipple discharge, skin change, tenderness, axillary adenopathy or supraclavicular adenopathy  Left: No swelling, bleeding, inverted nipple, mass, nipple discharge, skin change, tenderness, axillary adenopathy or supraclavicular adenopathy  Abdominal:      General: There is no distension  Palpations: Abdomen is soft  There is no mass  Tenderness: There is no abdominal tenderness  There is no guarding or rebound  Hernia: No hernia is present  There is no hernia in the left inguinal area or right inguinal area  Genitourinary:     General: Normal vulva        Labia:         Right: No rash, tenderness or lesion  Left: No rash, tenderness or lesion  Comments: Uterus anteverted normal size and contour freely mobile and nontender  There are no palpable adnexal masses or tenderness  Cervix is normal   Vagina with atrophic changes  Bartholin's and [place]'s glands normal   Urethral orifice normal   No cystocele or rectocele  Musculoskeletal:      Cervical back: Normal range of motion and neck supple  No tenderness  Lymphadenopathy:      Cervical: No cervical adenopathy  Upper Body:      Right upper body: No supraclavicular, axillary or pectoral adenopathy  Left upper body: No supraclavicular, axillary or pectoral adenopathy  Lower Body: No right inguinal adenopathy  No left inguinal adenopathy  Neurological:      Mental Status: She is alert

## 2022-04-25 ENCOUNTER — VBI (OUTPATIENT)
Dept: ADMINISTRATIVE | Facility: OTHER | Age: 67
End: 2022-04-25

## 2022-05-26 ENCOUNTER — TELEPHONE (OUTPATIENT)
Dept: GYNECOLOGY | Facility: CLINIC | Age: 67
End: 2022-05-26

## 2022-05-26 NOTE — TELEPHONE ENCOUNTER
Elyssa Parks, from [email or web address],  called regarding prior auth for patient  He states they are waiting on additional information  Please call them back at [phone]

## 2022-05-27 ENCOUNTER — DOCUMENTATION (OUTPATIENT)
Dept: GYNECOLOGY | Facility: CLINIC | Age: 67
End: 2022-05-27

## 2022-05-27 NOTE — PROGRESS NOTES
Records sent for auth for Prolia to [employer]  Then can send to [facility] when [place] is done

## 2022-05-31 DIAGNOSIS — M81.0 OSTEOPOROSIS WITHOUT CURRENT PATHOLOGICAL FRACTURE, UNSPECIFIED OSTEOPOROSIS TYPE: Primary | ICD-10-CM

## 2022-05-31 RX ORDER — DENOSUMAB 60 MG/ML
60 INJECTION SUBCUTANEOUS
Qty: 1 ML | Refills: 1 | Status: SHIPPED | OUTPATIENT
Start: 2022-05-31 | End: 2022-06-02 | Stop reason: SDUPTHER

## 2022-06-02 DIAGNOSIS — M81.0 OSTEOPOROSIS WITHOUT CURRENT PATHOLOGICAL FRACTURE, UNSPECIFIED OSTEOPOROSIS TYPE: ICD-10-CM

## 2022-06-02 RX ORDER — DENOSUMAB 60 MG/ML
60 INJECTION SUBCUTANEOUS
Qty: 1 ML | Refills: 1 | Status: SHIPPED | OUTPATIENT
Start: 2022-06-02 | End: 2022-07-19 | Stop reason: ALTCHOICE

## 2022-06-22 ENCOUNTER — VBI (OUTPATIENT)
Dept: ADMINISTRATIVE | Facility: OTHER | Age: 67
End: 2022-06-22

## 2022-07-12 ENCOUNTER — OFFICE VISIT (OUTPATIENT)
Dept: URGENT CARE | Age: 67
End: 2022-07-12
Payer: COMMERCIAL

## 2022-07-12 VITALS
OXYGEN SATURATION: 97 % | DIASTOLIC BLOOD PRESSURE: 70 MMHG | RESPIRATION RATE: 16 BRPM | HEART RATE: 76 BPM | SYSTOLIC BLOOD PRESSURE: 118 MMHG | TEMPERATURE: 96.7 F

## 2022-07-12 DIAGNOSIS — J30.1 SEASONAL ALLERGIC RHINITIS DUE TO POLLEN: Primary | ICD-10-CM

## 2022-07-12 PROCEDURE — 99213 OFFICE O/P EST LOW 20 MIN: CPT

## 2022-07-12 RX ORDER — BENZONATATE 200 MG/1
200 CAPSULE ORAL 3 TIMES DAILY PRN
Qty: 20 CAPSULE | Refills: 0 | Status: SHIPPED | OUTPATIENT
Start: 2022-07-12 | End: 2022-07-19 | Stop reason: ALTCHOICE

## 2022-07-12 RX ORDER — LORATADINE 10 MG/1
10 TABLET ORAL DAILY
Qty: 30 TABLET | Refills: 0 | Status: SHIPPED | OUTPATIENT
Start: 2022-07-12

## 2022-07-12 NOTE — PROGRESS NOTES
[facility]        NAME: Stacy Chaidez is a 77 y o  female  : 1955    MRN: [id number]  DATE: 2022  TIME: 9:33 AM    Assessment and Plan   Seasonal allergic rhinitis due to pollen [J30 1]  1  Seasonal allergic rhinitis due to pollen  benzonatate (TESSALON) 200 MG capsule    loratadine (CLARITIN) 10 mg tablet     Patient presents with complaints of congestion, cough  and PND  She states she has seasonal allergies and has not been taking her allergy medication daily  Denies fevers, COVID neg  Assessment notes congestion and PND  Lungs clear  Will reorder Claritin and tessalon for cough  Flonase was offered however patient declined  F/U with PCP as needed    Patient Instructions       Follow up with PCP as needed    Chief Complaint     Chief Complaint   Patient presents with    Cold Like Symptoms     Patient relates sinus congestion and drainage for past 4 days  Cough due to post nasal drip  Denies fevers, body aches, chills  Home covid tests were negative  History of Present Illness       Patient presents with complaints of congestion, cough  and PND  She states she has seasonal allergies and has not been taking her allergy medication daily  Denies fevers, COVID neg  Review of Systems   Review of Systems   Constitutional: Negative for chills and fever  HENT: Positive for congestion and postnasal drip  Negative for ear pain, sinus pain and sore throat  Eyes: Negative for pain and itching  Respiratory: Positive for cough  Negative for shortness of breath and wheezing  Cardiovascular: Negative for chest pain and palpitations  Gastrointestinal: Negative for abdominal pain, constipation, diarrhea, nausea and vomiting  Genitourinary: Negative for difficulty urinating and hematuria  Musculoskeletal: Negative for arthralgias and myalgias  Skin: Negative for rash  Neurological: Negative for dizziness, light-headedness and headaches     Psychiatric/Behavioral: Negative for agitation and sleep disturbance  The patient is not nervous/anxious            Current Medications       Current Outpatient Medications:     benzonatate (TESSALON) 200 MG capsule, Take 1 capsule (200 mg total) by mouth 3 (three) times a day as needed for cough, Disp: 20 capsule, Rfl: 0    denosumab (Prolia) 60 mg/mL, Inject 1 mL (60 mg total) under the skin every 6 (six) months, Disp: 1 mL, Rfl: 1    insulin lispro (HumaLOG) 100 units/mL injection, Inject 30 Units under the skin, Disp: , Rfl:     loratadine (CLARITIN) 10 mg tablet, Take 1 tablet (10 mg total) by mouth daily, Disp: 30 tablet, Rfl: 0    omega-3-acid ethyl esters (LOVAZA) 1 g capsule, Take 2 g by mouth 2 (two) times a day  , Disp: , Rfl:     acetaminophen (TYLENOL) 500 mg tablet, every 8 (eight) hours (Patient not taking: Reported on 1/31/2022 ), Disp: , Rfl:     amoxicillin (AMOXIL) 875 mg tablet, Take 875 mg by mouth 2 (two) times a day (Patient not taking: Reported on 1/31/2022 ), Disp: , Rfl:     atorvastatin (LIPITOR) 80 mg tablet, Take 1 tablet (80 mg total) by mouth daily with dinner (Patient not taking: Reported on 5/5/2021), Disp: 30 tablet, Rfl: 0    cholecalciferol (VITAMIN D3) 1,000 units tablet, Take 1,000 Units by mouth daily (Patient not taking: Reported on 1/31/2022 ), Disp: , Rfl:     Difluprednate (Durezol) 0 05 % EMUL, Durezol 0 05 % eye drops (Patient not taking: Reported on 1/31/2022), Disp: , Rfl:     ergocalciferol (VITAMIN D2) 50,000 units, Take 50,000 Units by mouth, Disp: , Rfl:     gabapentin (NEURONTIN) 300 mg capsule, Take 1 capsule (300 mg total) by mouth 3 (three) times a day, Disp: 90 capsule, Rfl: 0    insulin aspart (NOVOLOG FLEXPEN) 100 Units/mL injection pen, Inject 10 Units under the skin 3 (three) times a day with meals for 30 days (Patient not taking: Reported on 3/23/2021), Disp: 3 pen, Rfl: 0    insulin glargine (BASAGLAR KWIKPEN) 100 units/mL injection pen, Inject 30 Units under the skin daily for 30 days, Disp: 3 pen, Rfl: 0    olmesartan (BENICAR) 20 mg tablet, TAKE 1 TABLET BY MOUTH EVERY DAY (Patient not taking: Reported on 4/20/2022), Disp: 90 tablet, Rfl: 1    rosuvastatin (CRESTOR) 40 MG tablet, Take 40 mg by mouth daily (Patient not taking: Reported on 4/20/2022 ), Disp: , Rfl:     Trulicity 1 5 AN/7 8ZE SOPN, INJECT 1 5 MG UNDER THE SKIN EVERY 7 DAYS  (Patient not taking: Reported on 4/20/2022), Disp: , Rfl:     Current Allergies     Allergies as of 07/12/2022 - Reviewed 07/12/2022   Allergen Reaction Noted    Oxycodone Other (See Comments) 01/13/2021            The following portions of the patient's history were reviewed and updated as appropriate: allergies, current medications, past family history, past medical history, past social history, past surgical history and problem list      Past Medical History:   Diagnosis Date    Absent pulse     DP pulse B/L    Depression     Diabetes ([address] )     HLD (hyperlipidemia)     Neuropathy     Osteopenia     Prominence of large joints     (L) SC    Weight loss        Past Surgical History:   Procedure Laterality Date    EYE SURGERY  2018    TRIGGER FINGER RELEASE Left 03/12/2021       Family History   Problem Relation Age of Onset    Alzheimer's disease Mother     Heart attack Father     Diabetes Sister     No Known Problems Daughter     No Known Problems Maternal Grandmother     No Known Problems Paternal Grandmother     No Known Problems Sister     No Known Problems Sister     No Known Problems Sister     No Known Problems Sister     No Known Problems Sister     No Known Problems Sister     No Known Problems Daughter          Medications have been verified  Objective   /70   Pulse 76   Temp (!) 96 7 °F (35 9 °C)   Resp 16   SpO2 97%   No LMP recorded  Patient is postmenopausal        Physical Exam     Physical Exam  Vitals reviewed  Constitutional:       General: She is not in acute distress  Appearance: Normal appearance  HENT:      Head: Normocephalic and atraumatic  Right Ear: Tympanic membrane and ear canal normal       Left Ear: Tympanic membrane and ear canal normal       Nose: Congestion and rhinorrhea present  Mouth/Throat:      Mouth: Mucous membranes are moist       Pharynx: No oropharyngeal exudate or posterior oropharyngeal erythema  Eyes:      Extraocular Movements: Extraocular movements intact  Conjunctiva/sclera: Conjunctivae normal       Pupils: Pupils are equal, round, and reactive to light  Cardiovascular:      Rate and Rhythm: Normal rate and regular rhythm  Pulses: Normal pulses  Heart sounds: Normal heart sounds  No murmur heard  Pulmonary:      Effort: Pulmonary effort is normal  No respiratory distress  Breath sounds: Normal breath sounds  Abdominal:      General: Abdomen is flat  Bowel sounds are normal  There is no distension  Palpations: Abdomen is soft  Tenderness: There is no abdominal tenderness  There is no guarding or rebound  Musculoskeletal:         General: No swelling or tenderness  Normal range of motion  Cervical back: Normal range of motion and neck supple  No tenderness  Skin:     General: Skin is warm  Neurological:      General: No focal deficit present  Mental Status: She is alert     Psychiatric:         Mood and Affect: Mood normal          Behavior: Behavior normal          Judgment: Judgment normal

## 2022-07-19 ENCOUNTER — OFFICE VISIT (OUTPATIENT)
Dept: FAMILY MEDICINE CLINIC | Facility: CLINIC | Age: 67
End: 2022-07-19
Payer: COMMERCIAL

## 2022-07-19 VITALS
WEIGHT: 143.8 LBS | HEART RATE: 77 BPM | HEIGHT: 59 IN | BODY MASS INDEX: 28.99 KG/M2 | OXYGEN SATURATION: 98 % | TEMPERATURE: 97.6 F

## 2022-07-19 DIAGNOSIS — J01.00 ACUTE NON-RECURRENT MAXILLARY SINUSITIS: Primary | ICD-10-CM

## 2022-07-19 DIAGNOSIS — F17.200 SMOKING: ICD-10-CM

## 2022-07-19 DIAGNOSIS — D72.820 ELEVATED LYMPHOCYTES: ICD-10-CM

## 2022-07-19 DIAGNOSIS — R82.81 PYURIA: ICD-10-CM

## 2022-07-19 DIAGNOSIS — Z12.11 SCREENING FOR COLON CANCER: ICD-10-CM

## 2022-07-19 DIAGNOSIS — Z23 IMMUNIZATION DUE: ICD-10-CM

## 2022-07-19 DIAGNOSIS — R79.89 ABNORMAL LIVER FUNCTION TEST: ICD-10-CM

## 2022-07-19 DIAGNOSIS — R79.89 LOW VITAMIN D LEVEL: ICD-10-CM

## 2022-07-19 DIAGNOSIS — N28.9 ABNORMAL RENAL FUNCTION: ICD-10-CM

## 2022-07-19 DIAGNOSIS — I73.9 PVD (PERIPHERAL VASCULAR DISEASE) (HCC): ICD-10-CM

## 2022-07-19 DIAGNOSIS — E78.2 MIXED HYPERLIPIDEMIA: ICD-10-CM

## 2022-07-19 PROCEDURE — 1160F RVW MEDS BY RX/DR IN RCRD: CPT | Performed by: FAMILY MEDICINE

## 2022-07-19 PROCEDURE — 99214 OFFICE O/P EST MOD 30 MIN: CPT | Performed by: FAMILY MEDICINE

## 2022-07-19 RX ORDER — INSULIN PMP CART,AUT,G6/7,CNTR
EACH SUBCUTANEOUS
COMMUNITY

## 2022-07-19 RX ORDER — AMOXICILLIN 500 MG/1
500 CAPSULE ORAL EVERY 8 HOURS SCHEDULED
Qty: 30 CAPSULE | Refills: 0 | Status: SHIPPED | OUTPATIENT
Start: 2022-07-19 | End: 2022-07-29

## 2022-07-19 RX ORDER — BLOOD-GLUCOSE SENSOR
1 EACH MISCELLANEOUS
COMMUNITY
Start: 2022-03-21

## 2022-07-19 RX ORDER — FLASH GLUCOSE SENSOR
KIT MISCELLANEOUS
COMMUNITY

## 2022-07-19 RX ORDER — AMLODIPINE AND OLMESARTAN MEDOXOMIL 5; 40 MG/1; MG/1
TABLET ORAL
COMMUNITY

## 2022-07-19 NOTE — PROGRESS NOTES
Assessment/Plan:       No problem-specific Assessment & Plan notes found for this encounter  Diagnoses and all orders for this visit:    Acute non-recurrent maxillary sinusitis  Comments:  Patient declined COVID test     Advised patient to call if symptoms not better or worse  Orders:  -     amoxicillin (AMOXIL) 500 mg capsule; Take 1 capsule (500 mg total) by mouth every 8 (eight) hours for 10 days    Smoking  Comments:  Advised patient strongly to stop smoking    PVD (peripheral vascular disease) ([facility] )  Comments:  Patient to call if she developed claudication  Orders:  -     Ambulatory Referral to Vascular Surgery; Future    Elevated lymphocytes  -     CBC and differential; Future    Low vitamin D level  Comments:  Compensated    Abnormal renal function  Comments:  Resolved    Abnormal liver function test  Comments:  Resolved    Pyuria  -     UA (URINE) with reflex to Scope  -     Urine culture; Future    Mixed hyperlipidemia  Comments: To follow with low-fat diet  To follow with her Endocrinologist    Screening for colon cancer  -     Ambulatory referral for colonoscopy; Future    Immunization due  Comments:  prevnar 20 , pt declined    Other orders  -     Continuous Blood Gluc Sensor (Enlite Glucose Sensor) MISC; Use 1 each every 14 (fourteen) days  -     Continuous Blood Gluc Sensor (Via System) MISC; FreeStyle Maddy 2 Sensor kit  -     Insulin Disposable Pump (Omnipod 5 G6 Pod, Gen 5,) MISC;  Omnipod Dash 5 Pack Insulin Pod subcutaneous cartridge   USE AS DIRECTED DAILY  -     amlodipine-olmesartan (BRIANNA) 5-40 MG; amlodipine 5 mg-olmesartan 40 mg tablet   TAKE 1 TABLET BY MOUTH EVERYDAY AT BEDTIME        Patient Instructions   To call if not better , to follow with  test result      Orders Placed This Encounter   Procedures    Urine culture     Standing Status:   Future     Standing Expiration Date:   7/19/2023    UA (URINE) with reflex to Scope    CBC and differential     This is a patient instruction: This test is non-fasting  Please drink two glasses of water morning of bloodwork  Standing Status:   Future     Standing Expiration Date:   7/19/2023   [address] [place] [address] Ambulatory referral for colonoscopy     Standing Status:   Future     Standing Expiration Date:   7/18/2023     Referral Priority:   Routine     Referral Type:   Consult - AMB     Referral Reason:   Specialty Services Required     Requested Specialty:   Gastroenterology     Number of Visits Requested:   1     Expiration Date:   7/18/2023    Ambulatory Referral to Vascular Surgery     Standing Status:   Future     Standing Expiration Date:   7/19/2023     Referral Priority:   Routine     Referral Type:   Consult - AMB     Referral Reason:   Specialty Services Required     Referred to Provider:   Yayo Swan DO     Requested Specialty:   Vascular Surgery     Number of Visits Requested:   1     Expiration Date:   7/19/2023         Subjective:     Patient ID: Giovany Alston is a 77 y o  female      HPI   New complaint  Patient complaining of nasal drainage for 2 weeks   Yellowish greenish discharge  drainge   Also complaining of cough patient stated most likely secondary to her postnasal drainage  Denied chest pain, shortness off breast, fever or chills Was seen at the urgent care on July 12, 2022  She was given Claritin and cough medicine  Patient stated his symptoms not improved  Patient still smokes    Chronic medical problem  Diabetes  Denied polyuria or polydipsia  Following with Endocrinology  Denied claudication    Urgent care note   Labs 4 12- 22 , 6-18-22 ordered by Dr Josue Blake  Arterial Doppler of the lower extremity  Discussed result with patien    Echo, not done    Review of Systems   Constitutional: Negative for activity change, appetite change, chills, fatigue, fever and unexpected weight change  HENT: Positive for congestion and postnasal drip   Negative for ear discharge, ear pain, hearing loss, nosebleeds, rhinorrhea, sinus pressure, sore throat, tinnitus, trouble swallowing and voice change  Eyes: Negative for photophobia, pain and visual disturbance  Respiratory: Negative for cough, chest tightness, shortness of breath and wheezing  Cardiovascular: Negative for chest pain, palpitations and leg swelling  Gastrointestinal: Negative for abdominal pain, anal bleeding, blood in stool, constipation, diarrhea, nausea and vomiting  Endocrine: Negative for cold intolerance, heat intolerance, polydipsia and polyuria  Genitourinary: Negative for dysuria, frequency, hematuria and urgency  Musculoskeletal: Negative for arthralgias, back pain, gait problem, joint swelling, myalgias and neck pain  Skin: Negative for rash  Neurological: Negative for dizziness, tremors, seizures, syncope, weakness, light-headedness and headaches  Hematological: Negative for adenopathy  Does not bruise/bleed easily  Psychiatric/Behavioral: Negative for agitation, behavioral problems, confusion, dysphoric mood, hallucinations and sleep disturbance  The patient is not nervous/anxious  Objective:     Physical Exam  Constitutional:       General: She is not in acute distress  Appearance: Normal appearance  She is well-developed  She is not ill-appearing or diaphoretic  HENT:      Head: Normocephalic and atraumatic  Right Ear: Tympanic membrane and external ear normal       Left Ear: Tympanic membrane, ear canal and external ear normal       Nose: Congestion present  No rhinorrhea  Mouth/Throat:      Mouth: Mucous membranes are moist    Eyes:      General: No scleral icterus  Conjunctiva/sclera: Conjunctivae normal       Pupils: Pupils are equal, round, and reactive to light  Neck:      Thyroid: No thyromegaly  Vascular: No carotid bruit or JVD  Cardiovascular:      Rate and Rhythm: Normal rate and regular rhythm  Heart sounds: Normal heart sounds  No murmur heard       Comments: Extremities  No edema  Pulmonary:      Effort: Pulmonary effort is normal       Breath sounds: Normal breath sounds  Abdominal:      Palpations: Abdomen is soft  There is no mass  Tenderness: There is no abdominal tenderness  There is no guarding or rebound  Hernia: No hernia is present  Musculoskeletal:      Cervical back: Neck supple  Right lower leg: No edema  Left lower leg: No edema  Lymphadenopathy:      Cervical: No cervical adenopathy  Skin:     Coloration: Skin is not pale  Findings: No rash  Neurological:      General: No focal deficit present  Mental Status: She is alert and oriented to person, place, and time  Cranial Nerves: No cranial nerve deficit  Motor: No abnormal muscle tone  Coordination: Coordination normal    Psychiatric:         Mood and Affect: Mood normal          Behavior: Behavior normal          Thought Content:  Thought content normal          Judgment: Judgment normal

## 2022-07-20 ENCOUNTER — VBI (OUTPATIENT)
Dept: ADMINISTRATIVE | Facility: OTHER | Age: 67
End: 2022-07-20

## 2022-08-18 ENCOUNTER — VBI (OUTPATIENT)
Dept: ADMINISTRATIVE | Facility: OTHER | Age: 67
End: 2022-08-18

## 2022-09-18 ENCOUNTER — NURSE TRIAGE (OUTPATIENT)
Dept: OTHER | Facility: OTHER | Age: 67
End: 2022-09-18

## 2022-09-18 NOTE — TELEPHONE ENCOUNTER
Reason for Disposition   [1] COVID-19 diagnosed by positive lab test (e g , PCR, rapid self-test kit) AND [2] mild symptoms (e g , cough, fever, others) AND [4] no complications or SOB    Answer Assessment - Initial Assessment Questions  1  COVID-19 DIAGNOSIS: "Who made your COVID-19 diagnosis?" "Was it confirmed by a positive lab test or self-test?" If not diagnosed by a doctor (or NP/PA), ask "Are there lots of cases (community spread) where you live?" Note: See public health department website, if unsure  Positive home test    2  COVID-19 EXPOSURE: "Was there any known exposure to COVID before the symptoms began?" CDC Definition of close contact: within 6 feet (2 meters) for a total of 15 minutes or more over a 24-hour period  Unknown    3  ONSET: "When did the COVID-19 symptoms start?"    9/17    4  WORST SYMPTOM: "What is your worst symptom?" (e g , cough, fever, shortness of breath, muscle aches)  Cough    5  COUGH: "Do you have a cough?" If Yes, ask: "How bad is the cough?"    Mild cough    6  FEVER: "Do you have a fever?" If Yes, ask: "What is your temperature, how was it measured, and when did it start?"  No    7  RESPIRATORY STATUS: "Describe your breathing?" (e g , shortness of breath, wheezing, unable to speak)   Denies respiratory distress    8  BETTER-SAME-WORSE: "Are you getting better, staying the same or getting worse compared to yesterday?"  If getting worse, ask, "In what way?"  Same    9  HIGH RISK DISEASE: "Do you have any chronic medical problems?" (e g , asthma, heart or lung disease, weak immune system, obesity, etc )      Type 2 DM    10  VACCINE: "Have you had the COVID-19 vaccine?" If Yes, ask: "Which one, how many shots, when did you get it?"        covid-pfizer    11  BOOSTER: "Have you received your COVID-19 booster?" If Yes, ask: "Which one and when did you get it?"        One booster-pfizer    12   PREGNANCY: "Is there any chance you are pregnant?" "When was your last menstrual period?"  N/a    13  OTHER SYMPTOMS: "Do you have any other symptoms?"  (e g , chills, fatigue, headache, loss of smell or taste, muscle pain, sore throat)  Nasal congestion, cough    14   O2 SATURATION MONITOR:  "Do you use an oxygen saturation monitor (pulse oximeter) at home?" If Yes, ask "What is your reading (oxygen level) today?" "What is your usual oxygen saturation reading?" (e g , 95%)  No    Protocols used: CORONAVIRUS (COVID-19) DIAGNOSED OR SUSPECTED-ADULT-

## 2022-09-18 NOTE — TELEPHONE ENCOUNTER
Regarding: covid positive concern   ----- Message from Jhonny Dejesus sent at 9/18/2022 10:46 AM EDT -----  " I tested positive for covid, what do I do now ?"

## 2022-09-19 ENCOUNTER — TELEMEDICINE (OUTPATIENT)
Dept: FAMILY MEDICINE CLINIC | Facility: CLINIC | Age: 67
End: 2022-09-19
Payer: COMMERCIAL

## 2022-09-19 VITALS
BODY MASS INDEX: 29.04 KG/M2 | HEIGHT: 59 IN | SYSTOLIC BLOOD PRESSURE: 159 MMHG | TEMPERATURE: 97.5 F | DIASTOLIC BLOOD PRESSURE: 61 MMHG | HEART RATE: 72 BPM

## 2022-09-19 DIAGNOSIS — U07.1 COVID-19: Primary | ICD-10-CM

## 2022-09-19 DIAGNOSIS — I10 ESSENTIAL HYPERTENSION: ICD-10-CM

## 2022-09-19 PROCEDURE — 3078F DIAST BP <80 MM HG: CPT | Performed by: FAMILY MEDICINE

## 2022-09-19 PROCEDURE — 1160F RVW MEDS BY RX/DR IN RCRD: CPT | Performed by: FAMILY MEDICINE

## 2022-09-19 PROCEDURE — 3077F SYST BP >= 140 MM HG: CPT | Performed by: FAMILY MEDICINE

## 2022-09-19 PROCEDURE — 99213 OFFICE O/P EST LOW 20 MIN: CPT | Performed by: FAMILY MEDICINE

## 2022-09-19 RX ORDER — NIRMATRELVIR AND RITONAVIR 300-100 MG
3 KIT ORAL 2 TIMES DAILY
Qty: 30 TABLET | Refills: 0 | Status: SHIPPED | OUTPATIENT
Start: 2022-09-19 | End: 2022-09-24

## 2022-09-19 NOTE — PROGRESS NOTES
COVID-19 Outpatient Progress Note    Assessment/Plan:    Problem List Items Addressed This Visit        Cardiovascular and Mediastinum    Essential hypertension      Other Visit Diagnoses     COVID-19    -  Primary    Relevant Medications    nirmatrelvir & ritonavir (Paxlovid, 300/100,) tablet therapy pack         Disposition:     Discussed vitamin D, vitamin C, and/or zinc supplementation with patient  Advised patient to be quadrant T for 5 days since the beginning of the symptoms and sent to to follow with direct masking for another 5 days  Patient to call if her symptoms is not better or worse  Patient meets criteria for PAXLOVID and they have been counseled appropriately according to EUA documentation released by the FDA  After discussion, patient agrees to treatment  Rochellezack Pereyra is an investigational medicine used to treat mild-to-moderate COVID-19 in adults and children (15years of age and older weighing at least 80 pounds (40 kg)) with positive results of direct SARS-CoV-2 viral testing, and who are at high risk for progression to severe COVID-19, including hospitalization or death  PAXLOVID is investigational because it is still being studied  There is limited information about the safety and effectiveness of using PAXLOVID to treat people with mild-to-moderate COVID-19  The FDA has authorized the emergency use of PAXLOVID for the treatment of mild-tomoderate COVID-19 in adults and children (15years of age and older weighing at least 80 pounds (40 kg)) with a positive test for the virus that causes COVID-19, and who are at high risk for progression to severe COVID-19, including hospitalization or death, under an EUA  What should I tell my healthcare provider before I take PAXLOVID?     Tell your healthcare provider if you:  - Have any allergies  - Have liver or kidney disease  - Are pregnant or plan to become pregnant  - Are breastfeeding a child  - Have any serious illnesses    Tell your healthcare provider about all the medicines you take, including prescription and over-the-counter medicines, vitamins, and herbal supplements  Some medicines may interact with PAXLOVID and may cause serious side effects  Keep a list of your medicines to show your healthcare provider and pharmacist when you get a new medicine  You can ask your healthcare provider or pharmacist for a list of medicines that interact with PAXLOVID  Do not start taking a new medicine without telling your healthcare provider  Your healthcare provider can tell you if it is safe to take PAXLOVID with other medicines  Tell your healthcare provider if you are taking combined hormonal contraceptive  PAXLOVID may affect how your birth control pills work  Females who are able to become pregnant should use another effective alternative form of contraception or an additional barrier method of contraception  Talk to your healthcare provider if you have any questions about contraceptive methods that might be right for you  How do I take PAXLOVID? PAXLOVID consists of 2 medicines: nirmatrelvir and ritonavir  - Take 2 pink tablets of nirmatrelvir with 1 white tablet of ritonavir by mouth 2 times each day (in the morning and in the evening) for 5 days  For each dose, take all 3 tablets at the same time  - If you have kidney disease, talk to your healthcare provider  You may need a different dose  - Swallow the tablets whole  Do not chew, break, or crush the tablets  - Take PAXLOVID with or without food  - Do not stop taking PAXLOVID without talking to your healthcare provider, even if you feel better  - If you miss a dose of PAXLOVID within 8 hours of the time it is usually taken, take it as soon as you remember  If you miss a dose by more than 8 hours, skip the missed dose and take the next dose at your regular time  Do not take 2 doses of PAXLOVID at the same time    - If you take too much PAXLOVID, call your healthcare provider or go to the nearest hospital emergency room right away  - If you are taking a ritonavir- or cobicistat-containing medicine to treat hepatitis C or Human Immunodeficiency Virus (HIV), you should continue to take your medicine as prescribed by your healthcare provider   - Talk to your healthcare provider if you do not feel better or if you feel worse after 5 days  Who should generally not take PAXLOVID? Do not take PAXLOVID if:  You are allergic to nirmatrelvir, ritonavir, or any of the ingredients in PAXLOVID  You are taking any of the following medicines:  - Alfuzosin  - Pethidine, piroxicam, propoxyphene  - Ranolazine  - Amiodarone, dronedarone, flecainide, propafenone, quinidine  - Colchicine  - Lurasidone, pimozide, clozapine  - Dihydroergotamine, ergotamine, methylergonovine  - Lovastatin, simvastatin  - Sildenafil (Revatio®) for pulmonary arterial hypertension (PAH)  - Triazolam, oral midazolam  - Apalutamide  - Carbamazepine, phenobarbital, phenytoin  - Rifampin  - St  Salvadors Wort (hypericum perforatum)    What are the important possible side effects of PAXLOVID? Possible side effects of PAXLOVID are:  - Liver Problems  Tell your healthcare provider right away if you have any of these signs and symptoms of liver problems: loss of appetite, yellowing of your skin and the whites of eyes (jaundice), dark-colored urine, pale colored stools and itchy skin, stomach area (abdominal) pain  - Resistance to HIV Medicines  If you have untreated HIV infection, PAXLOVID may lead to some HIV medicines not working as well in the future  - Other possible side effects include: altered sense of taste, diarrhea, high blood pressure, or muscle aches    These are not all the possible side effects of PAXLOVID  Not many people have taken PAXLOVID  Serious and unexpected side effects may happen  Jacki Nickerson is still being studied, so it is possible that all of the risks are not known at this time      What other treatment choices are there? Like Joyce Turner may allow for the emergency use of other medicines to treat people with COVID-19  Go to https://blinkbox [email or web address] for information on the emergency use of other medicines that are authorized by FDA to treat people with COVID-19  Your healthcare provider may talk with you about clinical trials for which you may be eligible  It is your choice to be treated or not to be treated with PAXLOVID  Should you decide not to receive it or for your child not to receive it, it will not change your standard medical care  What if I am pregnant or breastfeeding? There is no experience treating pregnant women or breastfeeding mothers with PAXLOVID  For a mother and unborn baby, the benefit of taking PAXLOVID may be greater than the risk from the treatment  If you are pregnant, discuss your options and specific situation with your healthcare provider  It is recommended that you use effective barrier contraception or do not have sexual activity while taking PAXLOVID  If you are breastfeeding, discuss your options and specific situation with your healthcare provider  How do I report side effects with PAXLOVID? Contact your healthcare provider if you have any side effects that bother you or do not go away  Report side effects to FDA MedWatch at www fda gov/medwatch or call 6-390-TZS5141 or you can report side effects to [facility]  at the contact information provided below  Website Fax number Telephone number   [facility] [phone]     How should I store [address]? Store PAXLOVID tablets at room temperature between 68°F to 77°F (20°C to 25°C)  Full fact sheet for patients, parents, and caregivers can be found at: Dale lopez    I have spent 13 minutes directly with the patient         Encounter provider: José Morataya MD     Provider located at: [address] [place] [address]  [address]ia [facility] [address]  [address] [address] [address] [phone]     Recent Visits  Date Type Provider Dept   09/19/22 Telemedicine José Morataya MD [facility]   Showing recent visits within past 7 days and meeting all other requirements  Future Appointments  No visits were found meeting these conditions  Showing future appointments within next 150 days and meeting all other requirements     This virtual check-in was done via [employer] and patient was informed that this is a secure, HIPAA-compliant platform  She agrees to proceed  Patient agrees to participate in a virtual check in via telephone or video visit instead of presenting to the office to address urgent/immediate medical needs  Patient is aware this is a billable service  She acknowledged consent and understanding of privacy and security of the video platform  The patient has agreed to participate and understands they can discontinue the visit at any time  After connecting through [facility], the patient was identified by name and date of birth  Anatoliy Landin was informed that this was a telemedicine visit and that the exam was being conducted confidentially over secure lines  My office door was closed  No one else was in the room  Anatoliy Landin acknowledged consent and understanding of privacy and security of the telemedicine visit  I informed the patient that I have reviewed her record in Epic and presented the opportunity for her to ask any questions regarding the visit today  The patient agreed to participate  Verification of patient location:  Patient is located in the following state in which I hold an active license: [place]    Subjective:   Anatoliy Landin is a 79 y o  female who is concerned about COVID-19  Patient's symptoms include nasal congestion, cough and vomiting   Patient denies fever, chills, fatigue, malaise, rhinorrhea, sore throat, anosmia, loss of taste, shortness of breath, chest tightness, abdominal pain, nausea, diarrhea, myalgias and headaches  - Date of symptom onset: 9/17/2022      COVID-19 vaccination status: Fully vaccinated with booster    Exposure:   Contact with a person who is under investigation (PUI) for or who is positive for COVID-19 within the last 14 days?: No    Hospitalized recently for fever and/or lower respiratory symptoms?: No      Currently a healthcare worker that is involved in direct patient care?: No      Works in a special setting where the risk of COVID-19 transmission may be high? (this may include long-term care, correctional and long-term facilities; homeless shelters; assisted-living facilities and group homes ): No      Resident in a special setting where the risk of COVID-19 transmission may be high? (this may include long-term care, correctional and long-term facilities; homeless shelters; assisted-living facilities and group homes ): No      Tested positive for COVID infection on Sunday yesterday   vomited once on Saturday  Cough  Is dry  Symptoms are mild,same    No results found for: SARSCOV2, [facility], SARSCORONAVI, CORONAVIRUSR, [facility], [facility]    Review of Systems   Constitutional: Negative for chills, fatigue and fever  HENT: Positive for congestion  Negative for rhinorrhea and sore throat  Respiratory: Positive for cough  Negative for chest tightness and shortness of breath  Gastrointestinal: Positive for vomiting  Negative for abdominal pain, diarrhea and nausea  Musculoskeletal: Negative for myalgias  Neurological: Negative for headaches       Current Outpatient Medications on File Prior to Visit   Medication Sig    amlodipine-olmesartan (BRIANNA) 5-40 MG amlodipine 5 mg-olmesartan 40 mg tablet   TAKE 1 TABLET BY MOUTH EVERYDAY AT BEDTIME    Continuous Blood Gluc Sensor (Enlite Glucose Sensor) MISC Use 1 each every 14 (fourteen) days    Continuous Blood Gluc Sensor (FreeStyle [facility] FreeStyle [place] 2 Sensor kit    Difluprednate (Durezol) 0 05 % EMUL Durezol 0 05 % eye drops    ergocalciferol (VITAMIN D2) 50,000 units Take 50,000 Units by mouth    gabapentin (NEURONTIN) 300 mg capsule Take 1 capsule (300 mg total) by mouth 3 (three) times a day    Insulin Disposable Pump (Omnipod 5 G6 Pod, Gen 5,) MISC Omnipod Dash 5 Pack Insulin Pod subcutaneous cartridge   USE AS DIRECTED DAILY    loratadine (CLARITIN) 10 mg tablet Take 1 tablet (10 mg total) by mouth daily    omega-3-acid ethyl esters (LOVAZA) 1 g capsule Take 2 g by mouth 2 (two) times a day      acetaminophen (TYLENOL) 500 mg tablet every 8 (eight) hours (Patient not taking: Reported on 9/19/2022)    insulin aspart (NOVOLOG FLEXPEN) 100 Units/mL injection pen Inject 10 Units under the skin 3 (three) times a day with meals for 30 days    insulin glargine (BASAGLAR KWIKPEN) 100 units/mL injection pen Inject 30 Units under the skin daily for 30 days    Trulicity 1 5 AK/5 9AS SOPN INJECT 1 5 MG UNDER THE SKIN EVERY 7 DAYS  (Patient not taking: Reported on 9/19/2022)       Objective:    /61 (BP Location: Left arm, Patient Position: Sitting, Cuff Size: Standard)   Pulse 72   Temp 97 5 °F (36 4 °C) (Temporal)   Ht 4' 11" (1 499 m)   BMI 29 04 kg/m²      Physical Exam  Reji Walden MD

## 2022-09-20 ENCOUNTER — VBI (OUTPATIENT)
Dept: ADMINISTRATIVE | Facility: OTHER | Age: 67
End: 2022-09-20

## 2022-09-21 ENCOUNTER — VBI (OUTPATIENT)
Dept: ADMINISTRATIVE | Facility: OTHER | Age: 67
End: 2022-09-21

## 2022-09-29 ENCOUNTER — VBI (OUTPATIENT)
Dept: ADMINISTRATIVE | Facility: OTHER | Age: 67
End: 2022-09-29

## 2022-10-12 PROBLEM — J01.00 ACUTE MAXILLARY SINUSITIS: Status: RESOLVED | Noted: 2021-11-24 | Resolved: 2022-10-12

## 2022-10-12 PROBLEM — Z00.00 MEDICARE WELCOME EXAM: Status: RESOLVED | Noted: 2021-11-24 | Resolved: 2022-10-12

## 2022-10-27 ENCOUNTER — OFFICE VISIT (OUTPATIENT)
Dept: FAMILY MEDICINE CLINIC | Facility: CLINIC | Age: 67
End: 2022-10-27
Payer: COMMERCIAL

## 2022-10-27 VITALS
DIASTOLIC BLOOD PRESSURE: 79 MMHG | TEMPERATURE: 96.9 F | HEART RATE: 94 BPM | OXYGEN SATURATION: 98 % | WEIGHT: 144 LBS | HEIGHT: 59 IN | SYSTOLIC BLOOD PRESSURE: 150 MMHG | BODY MASS INDEX: 29.03 KG/M2

## 2022-10-27 DIAGNOSIS — Z12.11 ENCOUNTER FOR SCREENING FOR MALIGNANT NEOPLASM OF COLON: ICD-10-CM

## 2022-10-27 DIAGNOSIS — K29.60 REFLUX GASTRITIS: Primary | ICD-10-CM

## 2022-10-27 DIAGNOSIS — R10.13 EPIGASTRIC PAIN: ICD-10-CM

## 2022-10-27 PROCEDURE — 99214 OFFICE O/P EST MOD 30 MIN: CPT | Performed by: FAMILY MEDICINE

## 2022-10-27 RX ORDER — OMEPRAZOLE 20 MG/1
20 CAPSULE, DELAYED RELEASE ORAL DAILY
Qty: 30 CAPSULE | Refills: 0 | Status: SHIPPED | OUTPATIENT
Start: 2022-10-27

## 2022-10-27 NOTE — PROGRESS NOTES
Subjective:    HPI  Blaise Henry is a 79 y o  female here today for:  Chief Complaint   Patient presents with   • acid reflux     Makes her throw-up sometimes     ---Above per clinical staff & reviewed  ---  HPI:  70yof here for complaints of reflux that has been bad over the past 2 weeks  Using over the counter medication  Noticed reflux a lot with certain foods  States she was taking ibuprofen frequently over the past month or so due to dental problems     Discussed foods to avoid and cut down on caffeine  Discussed meds and avoiding triggers  Follow up as needed    The following portions of the patient's history were reviewed and updated as appropriate: allergies, current medications, past family history, past medical history, past social history, past surgical history and problem list     Past Medical History:   Diagnosis Date   • Absent pulse     DP pulse B/L   • Depression    • Diabetes ([address] )    • HLD (hyperlipidemia)    • Neuropathy    • Osteopenia    • Prominence of large joints     (L) SC   • Weight loss        Past Surgical History:   Procedure Laterality Date   • EYE SURGERY  2018   • TRIGGER FINGER RELEASE Left 03/12/2021       Social History     Socioeconomic History   • Marital status: /Civil Union     Spouse name: None   • Number of children: None   • Years of education: None   • Highest education level: None   Occupational History   • Occupation:  [employer]   Tobacco Use   • Smoking status: Current Every Day Smoker     Packs/day: 0 50     Years: 20 00     Pack years: 10 00     Types: Cigarettes   • Smokeless tobacco: Never Used   Vaping Use   • Vaping Use: Never used   Substance and Sexual Activity   • Alcohol use: Not Currently   • Drug use: Never   • Sexual activity: Not Currently     Birth control/protection: Post-menopausal   Other Topics Concern   • None   Social History Narrative   • None     Social Determinants of Health     Financial Resource Strain: Not on file   Food Insecurity: Not on file   Transportation Needs: Not on file   Physical Activity: Not on file   Stress: Not on file   Social Connections: Not on file   Intimate Partner Violence: Not on file   Housing Stability: Not on file       Current Outpatient Medications   Medication Sig Dispense Refill   • acetaminophen (TYLENOL) 500 mg tablet every 8 (eight) hours     • Continuous Blood Gluc Sensor (Enlite Glucose Sensor) MISC Use 1 each every 14 (fourteen) days     • Continuous Blood Gluc Sensor (FreeStyle [employer] Sensor System) MISC FreeStyle Maddy 2 Sensor kit     • Difluprednate (Durezol) 0 05 % EMUL Durezol 0 05 % eye drops     • ergocalciferol (VITAMIN D2) 50,000 units Take 50,000 Units by mouth     • gabapentin (NEURONTIN) 300 mg capsule Take 1 capsule (300 mg total) by mouth 3 (three) times a day 90 capsule 0   • insulin aspart (NOVOLOG FLEXPEN) 100 Units/mL injection pen Inject 10 Units under the skin 3 (three) times a day with meals for 30 days 3 pen 0   • Insulin Disposable Pump (Omnipod 5 G6 Pod, Gen 5,) MISC Omnipod Dash 5 Pack Insulin Pod subcutaneous cartridge   USE AS DIRECTED DAILY     • insulin glargine (BASAGLAR KWIKPEN) 100 units/mL injection pen Inject 30 Units under the skin daily for 30 days 3 pen 0   • loratadine (CLARITIN) 10 mg tablet Take 1 tablet (10 mg total) by mouth daily 30 tablet 0   • omega-3-acid ethyl esters (LOVAZA) 1 g capsule Take 2 g by mouth 2 (two) times a day       • omeprazole (PriLOSEC) 20 mg delayed release capsule Take 1 capsule (20 mg total) by mouth daily 30 capsule 0   • Trulicity 1 5 ES/0 5NK SOPN      • amlodipine-olmesartan (BRIANNA) 5-40 MG amlodipine 5 mg-olmesartan 40 mg tablet   TAKE 1 TABLET BY MOUTH EVERYDAY AT BEDTIME       No current facility-administered medications for this visit  Review of Systems   Constitutional: Negative  Negative for chills and fever  HENT: Negative  Negative for ear pain and sore throat      Eyes: Negative for pain and visual disturbance  Respiratory: Negative  Negative for cough and shortness of breath  Cardiovascular: Negative  Negative for chest pain and palpitations  Gastrointestinal: Positive for abdominal pain  Negative for vomiting  Genitourinary: Negative  Negative for dysuria and hematuria  Musculoskeletal: Negative for arthralgias and back pain  Skin: Negative for color change and rash  Neurological: Negative  Negative for seizures and syncope  Psychiatric/Behavioral: Negative  Objective:    /79 (BP Location: Left arm, Patient Position: Sitting, Cuff Size: Standard)   Pulse 94   Temp (!) 96 9 °F (36 1 °C) (Temporal)   Ht 4' 11" (1 499 m)   Wt 65 3 kg (144 lb)   SpO2 98%   BMI 29 08 kg/m²   Wt Readings from Last 3 Encounters:   10/27/22 65 3 kg (144 lb)   07/19/22 65 2 kg (143 lb 12 8 oz)   04/20/22 66 7 kg (147 lb)     BP Readings from Last 3 Encounters:   10/27/22 150/79   09/19/22 159/61   07/12/22 118/70       Lab Results   Component Value Date    WBC 7 70 01/31/2022    HGB 13 0 01/31/2022    HCT 37 0 01/31/2022     01/31/2022    TRIG 118 08/17/2018    HDL 44 08/17/2018    ALT 22 04/12/2022    AST 14 04/12/2022    K 4 7 04/12/2022     04/12/2022    CREATININE 0 75 04/12/2022    BUN 12 04/12/2022    CO2 28 04/12/2022    TSH 1 96 09/02/2017    GLUF 106 (H) 08/21/2019    HGBA1C 9 2 (H) 06/18/2022       Physical Exam  Vitals and nursing note reviewed  Constitutional:       Appearance: Normal appearance  She is well-developed  HENT:      Head: Normocephalic and atraumatic  Eyes:      Pupils: Pupils are equal, round, and reactive to light  Cardiovascular:      Rate and Rhythm: Normal rate and regular rhythm  Heart sounds: No murmur heard  Pulmonary:      Effort: Pulmonary effort is normal       Breath sounds: Normal breath sounds  Abdominal:      General: Bowel sounds are normal  There is no distension  Palpations: Abdomen is soft  Tenderness:  There is abdominal tenderness  Comments: Tenderness palpating the upper mid epigasric area   Musculoskeletal:      Cervical back: Normal range of motion and neck supple  Skin:     General: Skin is warm  Capillary Refill: Capillary refill takes less than 2 seconds  Neurological:      Mental Status: She is alert and oriented to person, place, and time  Cranial Nerves: No cranial nerve deficit  Psychiatric:         Mood and Affect: Mood normal          Thought Content: Thought content normal                        Assessment/Plan:   Debby Yoon was seen today for acid reflux  Diagnoses and all orders for this visit:    Reflux gastritis  -     omeprazole (PriLOSEC) 20 mg delayed release capsule; Take 1 capsule (20 mg total) by mouth daily    Epigastric pain    Encounter for screening for malignant neoplasm of colon  -     Ambulatory Referral to Colorectal Surgery; Future      Return in about 4 weeks (around 11/24/2022) for Annual physical   Patient Instructions     Please read over information and avoid the foods listed  Stop anti inflammatories  Use medication and watch diet  Diet for Stomach Ulcers and Gastritis   WHAT YOU NEED TO KNOW:   A diet for stomach ulcers and gastritis is a meal plan that limits foods that irritate your stomach  Certain foods may worsen symptoms such as stomach pain, bloating, heartburn, or indigestion  DISCHARGE INSTRUCTIONS:   Foods to limit or avoid:  You may need to avoid acidic, spicy, or high-fat foods  Not all foods affect everyone the same way  You will need to learn which foods worsen your symptoms and limit those foods  The following are some foods that may worsen ulcer or gastritis symptoms:  1  Beverages:      ? Whole milk and chocolate milk    ? Hot cocoa and cola    ? Any beverage with caffeine    ? Regular and decaffeinated coffee    ? Peppermint and spearmint tea    ? Green and black tea, with or without caffeine    ? Orange and grapefruit juices    ?  Drinks that contain alcohol    2  Spices and seasonings:      ? Black and red pepper    ? Chili powder    ? Mustard seed and nutmeg    3  Other foods:      ? Dairy foods made from whole milk or cream    ? Chocolate    ? Spicy or strongly flavored cheeses, such as jalapeno or black pepper    ? Highly seasoned, high-fat meats, such as sausage, salami, helton, ham, and cold cuts    ? Hot chiles and peppers    ? Tomato products, such as tomato paste, tomato sauce, or tomato juice    Foods to include:  Eat a variety of healthy foods from all the food groups  Eat fruits, vegetables, whole grains, and fat-free or low-fat dairy foods  Whole grains include whole-wheat breads, cereals, pasta, and brown rice  Choose lean meats, poultry (chicken and turkey), fish, beans, eggs, and nuts  A healthy meal plan is low in unhealthy fats, salt, and added sugar  Healthy fats include olive oil and canola oil  Ask your dietitian for more information about a healthy diet  Other helpful guidelines:   · Do not eat right before bedtime  Stop eating at least 2 hours before bedtime  · Eat small, frequent meals  Your stomach may tolerate small, frequent meals better than large meals  © Copyright [employer] 2022 Information is for End User's use only and may not be sold, redistributed or otherwise used for commercial purposes  All illustrations and images included in CareNotes® are the copyrighted property of [employer]  or Demi Ruiz   The above information is an  only  It is not intended as medical advice for individual conditions or treatments  Talk to your doctor, nurse or pharmacist before following any medical regimen to see if it is safe and effective for you

## 2022-10-27 NOTE — PATIENT INSTRUCTIONS
Please read over information and avoid the foods listed  Stop anti inflammatories  Use medication and watch diet  Diet for Stomach Ulcers and Gastritis   WHAT YOU NEED TO KNOW:   A diet for stomach ulcers and gastritis is a meal plan that limits foods that irritate your stomach  Certain foods may worsen symptoms such as stomach pain, bloating, heartburn, or indigestion  DISCHARGE INSTRUCTIONS:   Foods to limit or avoid:  You may need to avoid acidic, spicy, or high-fat foods  Not all foods affect everyone the same way  You will need to learn which foods worsen your symptoms and limit those foods  The following are some foods that may worsen ulcer or gastritis symptoms:  Beverages:      Whole milk and chocolate milk    Hot cocoa and cola    Any beverage with caffeine    Regular and decaffeinated coffee    Peppermint and spearmint tea    Green and black tea, with or without caffeine    Orange and grapefruit juices    Drinks that contain alcohol    Spices and seasonings:      Black and red pepper    Chili powder    Mustard seed and nutmeg    Other foods:      Dairy foods made from whole milk or cream    Chocolate    Spicy or strongly flavored cheeses, such as jalapeno or black pepper    Highly seasoned, high-fat meats, such as sausage, salami, helton, ham, and cold cuts    Hot chiles and peppers    Tomato products, such as tomato paste, tomato sauce, or tomato juice    Foods to include:  Eat a variety of healthy foods from all the food groups  Eat fruits, vegetables, whole grains, and fat-free or low-fat dairy foods  Whole grains include whole-wheat breads, cereals, pasta, and brown rice  Choose lean meats, poultry (chicken and turkey), fish, beans, eggs, and nuts  A healthy meal plan is low in unhealthy fats, salt, and added sugar  Healthy fats include olive oil and canola oil  Ask your dietitian for more information about a healthy diet  Other helpful guidelines:   Do not eat right before bedtime    Stop eating at least 2 hours before bedtime  Eat small, frequent meals  Your stomach may tolerate small, frequent meals better than large meals  © Copyright [employer] 2022 Information is for End User's use only and may not be sold, redistributed or otherwise used for commercial purposes  All illustrations and images included in CareNotes® are the copyrighted property of [employer]  or [employer]i Ruiz   The above information is an  only  It is not intended as medical advice for individual conditions or treatments  Talk to your doctor, nurse or pharmacist before following any medical regimen to see if it is safe and effective for you

## 2022-11-09 ENCOUNTER — VBI (OUTPATIENT)
Dept: ADMINISTRATIVE | Facility: OTHER | Age: 67
End: 2022-11-09

## 2022-11-17 ENCOUNTER — VBI (OUTPATIENT)
Dept: ADMINISTRATIVE | Facility: OTHER | Age: 67
End: 2022-11-17

## 2022-11-18 DIAGNOSIS — K29.60 REFLUX GASTRITIS: ICD-10-CM

## 2022-11-18 RX ORDER — OMEPRAZOLE 20 MG/1
CAPSULE, DELAYED RELEASE ORAL
Qty: 90 CAPSULE | Refills: 1 | Status: SHIPPED | OUTPATIENT
Start: 2022-11-18

## 2022-11-22 ENCOUNTER — RA CDI HCC (OUTPATIENT)
Dept: OTHER | Facility: HOSPITAL | Age: 67
End: 2022-11-22

## 2022-11-22 NOTE — PROGRESS NOTES
[address]  coding opportunities          Chart Reviewed number of suggestions sent to Provider: 3   [id number]  [id number]  [id number]  Patients Insurance     Medicare Insurance: [facility] [employer]

## 2022-11-28 ENCOUNTER — VBI (OUTPATIENT)
Dept: ADMINISTRATIVE | Facility: OTHER | Age: 67
End: 2022-11-28

## 2022-11-29 ENCOUNTER — OFFICE VISIT (OUTPATIENT)
Dept: FAMILY MEDICINE CLINIC | Facility: CLINIC | Age: 67
End: 2022-11-29

## 2022-11-29 VITALS
HEIGHT: 59 IN | OXYGEN SATURATION: 98 % | WEIGHT: 143.1 LBS | HEART RATE: 84 BPM | SYSTOLIC BLOOD PRESSURE: 149 MMHG | DIASTOLIC BLOOD PRESSURE: 69 MMHG | TEMPERATURE: 97.1 F | BODY MASS INDEX: 28.85 KG/M2

## 2022-11-29 DIAGNOSIS — M85.80 OSTEOPENIA, UNSPECIFIED LOCATION: ICD-10-CM

## 2022-11-29 DIAGNOSIS — E11.42 TYPE 2 DIABETES MELLITUS WITH POLYNEUROPATHY (HCC): ICD-10-CM

## 2022-11-29 DIAGNOSIS — E78.2 MIXED HYPERLIPIDEMIA: ICD-10-CM

## 2022-11-29 DIAGNOSIS — Z12.11 SCREENING FOR COLON CANCER: ICD-10-CM

## 2022-11-29 DIAGNOSIS — R07.81 RIB PAIN ON LEFT SIDE: ICD-10-CM

## 2022-11-29 DIAGNOSIS — Z00.00 MEDICARE ANNUAL WELLNESS VISIT, SUBSEQUENT: Primary | ICD-10-CM

## 2022-11-29 DIAGNOSIS — I10 ESSENTIAL HYPERTENSION: ICD-10-CM

## 2022-11-29 NOTE — PROGRESS NOTES
Assessment and Plan:     Problem List Items Addressed This Visit        Endocrine    Type 2 diabetes mellitus with polyneuropathy ([address] )    Relevant Orders    CBC and differential    Comprehensive metabolic panel    Lipid panel    TSH, 3rd generation with Free T4 reflex    HEMOGLOBIN A1C W/ EAG ESTIMATION       Cardiovascular and Mediastinum    Essential hypertension    Relevant Orders    CBC and differential    Comprehensive metabolic panel    Lipid panel    TSH, 3rd generation with Free T4 reflex    HEMOGLOBIN A1C W/ EAG ESTIMATION       Musculoskeletal and Integument    Osteopenia    Relevant Orders    CBC and differential    Comprehensive metabolic panel    Lipid panel    TSH, 3rd generation with Free T4 reflex    HEMOGLOBIN A1C W/ EAG ESTIMATION       Other    HLD (hyperlipidemia)    Relevant Orders    CBC and differential    Comprehensive metabolic panel    Lipid panel    TSH, 3rd generation with Free T4 reflex    HEMOGLOBIN A1C W/ EAG ESTIMATION   Other Visit Diagnoses     Medicare annual wellness visit, subsequent    -  Primary    Relevant Orders    CBC and differential    Comprehensive metabolic panel    Lipid panel    TSH, 3rd generation with Free T4 reflex    HEMOGLOBIN A1C W/ EAG ESTIMATION    BMI 28 0-28 9,adult        Relevant Orders    CBC and differential    Comprehensive metabolic panel    Lipid panel    TSH, 3rd generation with Free T4 reflex    HEMOGLOBIN A1C W/ EAG ESTIMATION    Screening for colon cancer        Relevant Orders    Cologuard    Rib pain on left side        Relevant Orders    XR ribs left w pa chest min 3 views           Preventive health issues were discussed with patient, and age appropriate screening tests were ordered as noted in patient's After Visit Summary  Personalized health advice and appropriate referrals for health education or preventive services given if needed, as noted in patient's After Visit Summary       History of Present Illness:     Patient presents for a Medicare Wellness Visit  Doing well  Follows with multiple specialists  Complains of rib pain- has been going on quite awhile  Cant take NSAIDs  Reviewed questions with pt  Due for labs, cologuard ordered also    PHQ-2/9 Depression Screening    Little interest or pleasure in doing things: 0 - not at all  Feeling down, depressed, or hopeless: 0 - not at all  Trouble falling or staying asleep, or sleeping too much: 0 - not at all  Feeling tired or having little energy: 0 - not at all  Poor appetite or overeatin - not at all  Feeling bad about yourself - or that you are a failure or have let yourself or your family down: 0 - not at all  Trouble concentrating on things, such as reading the newspaper or watching television: 0 - not at all  Moving or speaking so slowly that other people could have noticed  Or the opposite - being so fidgety or restless that you have been moving around a lot more than usual: 0 - not at all  Thoughts that you would be better off dead, or of hurting yourself in some way: 0 - not at all  PHQ-9 Score: 0   PHQ-9 Interpretation: No or Minimal depression                  Patient Care Team:  Piyush Noyola MD as PCP - General (Family Medicine)  Angel Ji MD (Endocrinology)  Amarilys Barnes DO (Obstetrics and Gynecology)  Naida Arreola MD (Ophthalmology)     Review of Systems:     Review of Systems   Constitutional: Negative  Negative for chills and fever  HENT: Negative  Negative for ear pain and sore throat  Eyes: Negative for pain and visual disturbance  Respiratory: Negative  Negative for cough and shortness of breath  Cardiovascular: Negative  Negative for chest pain and palpitations  Gastrointestinal: Negative  Negative for abdominal pain and vomiting  Genitourinary: Negative  Negative for dysuria and hematuria  Musculoskeletal: Negative for arthralgias and back pain  Skin: Negative for color change and rash  Neurological: Negative    Negative for seizures and syncope  Psychiatric/Behavioral: Negative           Problem List:     Patient Active Problem List   Diagnosis   • Osteopenia   • Uncontrolled insulin-dependent diabetes mellitus with hyperglycemia   • Depression   • Acute bronchitis   • Diaphoresis   • HLD (hyperlipidemia)   • Tobacco abuse   • Elevated blood pressure reading   • Leukocytosis   • HIV screening declined   • Head contusion   • Decreased pedal pulses   • Essential hypertension   • Elevated platelet count   • Low vitamin D level   • Osteoporosis without current pathological fracture   • Immunization due   • Type 2 diabetes mellitus with polyneuropathy (HCC)   • Syncope      Past Medical and Surgical History:     Past Medical History:   Diagnosis Date   • Absent pulse     DP pulse B/L   • Depression    • Diabetes (HCC)    • HLD (hyperlipidemia)    • Neuropathy    • Osteopenia    • Prominence of large joints     (L) SC   • Weight loss      Past Surgical History:   Procedure Laterality Date   • EYE SURGERY  2018   • TRIGGER FINGER RELEASE Left 03/12/2021      Family History:     Family History   Problem Relation Age of Onset   • Alzheimer's disease Mother    • Heart attack Father    • Diabetes Sister    • No Known Problems Daughter    • No Known Problems Maternal Grandmother    • No Known Problems Paternal Grandmother    • No Known Problems Sister    • No Known Problems Sister    • No Known Problems Sister    • No Known Problems Sister    • No Known Problems Sister    • No Known Problems Sister    • No Known Problems Daughter       Social History:     Social History     Socioeconomic History   • Marital status: /Civil Union     Spouse name: None   • Number of children: None   • Years of education: None   • Highest education level: None   Occupational History   • Occupation:  [employer]   Tobacco Use   • Smoking status: Every Day     Packs/day: 0 50     Years: 20 00     Pack years: 10 00     Types: Cigarettes   • Smokeless tobacco: Never   Vaping Use   • Vaping Use: Never used   Substance and Sexual Activity   • Alcohol use: Not Currently   • Drug use: Never   • Sexual activity: Not Currently     Birth control/protection: Post-menopausal   Other Topics Concern   • None   Social History Narrative   • None     Social Determinants of Health     Financial Resource Strain: Low Risk    • Difficulty of Paying Living Expenses: Not hard at all   Food Insecurity: Not on file   Transportation Needs: No Transportation Needs   • Lack of Transportation (Medical): No   • Lack of Transportation (Non-Medical):  No   Physical Activity: Not on file   Stress: Not on file   Social Connections: Not on file   Intimate Partner Violence: Not on file   Housing Stability: Not on file      Medications and Allergies:     Current Outpatient Medications   Medication Sig Dispense Refill   • amlodipine-olmesartan (BRIANNA) 5-40 MG amlodipine 5 mg-olmesartan 40 mg tablet   TAKE 1 TABLET BY MOUTH EVERYDAY AT BEDTIME     • Continuous Blood Gluc Sensor (Enlite Glucose Sensor) MISC Use 1 each every 14 (fourteen) days     • Continuous Blood Gluc Sensor (FreeStyle [employer] Sensor System) MISC FreeStyle Maddy 2 Sensor kit     • Difluprednate (Durezol) 0 05 % EMUL Durezol 0 05 % eye drops     • ergocalciferol (VITAMIN D2) 50,000 units Take 50,000 Units by mouth     • gabapentin (NEURONTIN) 300 mg capsule Take 1 capsule (300 mg total) by mouth 3 (three) times a day 90 capsule 0   • insulin aspart (NOVOLOG FLEXPEN) 100 Units/mL injection pen Inject 10 Units under the skin 3 (three) times a day with meals for 30 days 3 pen 0   • Insulin Disposable Pump (Omnipod 5 G6 Pod, Gen 5,) MISC Omnipod Dash 5 Pack Insulin Pod subcutaneous cartridge   USE AS DIRECTED DAILY     • loratadine (CLARITIN) 10 mg tablet Take 1 tablet (10 mg total) by mouth daily 30 tablet 0   • omega-3-acid ethyl esters (LOVAZA) 1 g capsule Take 2 g by mouth 2 (two) times a day       • omeprazole (PriLOSEC) 20 mg delayed release capsule TAKE 1 CAPSULE BY MOUTH EVERY DAY 90 capsule 1   • Trulicity 1 5 QZ/0 5WD SOPN      • acetaminophen (TYLENOL) 500 mg tablet every 8 (eight) hours (Patient not taking: Reported on 11/29/2022)       No current facility-administered medications for this visit  Allergies   Allergen Reactions   • Oxycodone Other (See Comments)     fatigue      Immunizations:     Immunization History   Administered Date(s) Administered   • COVID-19 PFIZER VACCINE 0 3 ML IM 03/16/2021, 04/06/2021, 12/17/2021   • INFLUENZA 09/23/2009, 10/09/2012, 10/01/2013, 10/10/2018, 10/05/2020   • Influenza, seasonal, injectable 10/06/2015, 10/05/2017   • Influenza, seasonal, injectable, preservative free 10/01/2014   • Pneumococcal Polysaccharide PPV23 01/13/2021   • Tdap 12/19/2017, 10/16/2019      Health Maintenance:         Topic Date Due   • Colorectal Cancer Screening  Never done   • Breast Cancer Screening: Mammogram  03/09/2023   • Hepatitis C Screening  Completed         Topic Date Due   • COVID-19 Vaccine (4 - Booster for Pfizer series) 04/17/2022   • Influenza Vaccine (1) 09/01/2022      Medicare Screening Tests and Risk Assessments:     Italo Collins is here for her Subsequent Wellness visit  Health Risk Assessment:   Patient rates overall health as good  Patient feels that their physical health rating is slightly worse  Patient is very satisfied with their life  Eyesight was rated as slightly worse  Hearing was rated as same  Patient feels that their emotional and mental health rating is same  Patients states they are sometimes angry  Patient states they are never, rarely unusually tired/fatigued  Pain experienced in the last 7 days has been some  Patient's pain rating has been 4/10  Patient states that she has experienced no weight loss or gain in last 6 months  Fall Risk Screening:    In the past year, patient has experienced: no history of falling in past year      Urinary Incontinence Screening:   Patient has not leaked urine accidently in the last six months  Home Safety:  Patient has trouble with stairs inside or outside of their home  Patient has working smoke alarms and has working carbon monoxide detector  Home safety hazards include: none  Nutrition:   Current diet is Regular  Medications:   Patient is currently taking over-the-counter supplements  OTC medications include: see medication list  Patient is able to manage medications  Activities of Daily Living (ADLs)/Instrumental Activities of Daily Living (IADLs):   Walk and transfer into and out of bed and chair?: Yes  Dress and groom yourself?: Yes    Bathe or shower yourself?: Yes    Feed yourself?  Yes  Do your laundry/housekeeping?: Yes  Manage your money, pay your bills and track your expenses?: Yes  Make your own meals?: Yes    Do your own shopping?: Yes    Previous Hospitalizations:   Any hospitalizations or ED visits within the last 12 months?: No      Advance Care Planning:   Living will: No    Five wishes given: Yes      PREVENTIVE SCREENINGS      Cardiovascular Screening:    General: Screening Not Indicated and History Lipid Disorder      Diabetes Screening:     General: Screening Not Indicated and History Diabetes      Colorectal Cancer Screening:     General: Screening Current      Breast Cancer Screening:     General: Screening Current      Cervical Cancer Screening:    General: Screening Not Indicated      Osteoporosis Screening:    General: Screening Not Indicated and History Osteoporosis      Lung Cancer Screening:     General: Screening Not Indicated      Hepatitis C Screening:    General: Screening Current    Screening, Brief Intervention, and Referral to Treatment (SBIRT)    Screening    Typical number of drinks in a week: 0    Single Item Drug Screening:  How often have you used an illegal drug (including marijuana) or a prescription medication for non-medical reasons in the past year? never    Single Item Drug Screen Score: 0  Interpretation: Negative screen for possible drug use disorder    No results found  Physical Exam:     /69 (BP Location: Left arm, Patient Position: Sitting, Cuff Size: Standard)   Pulse 84   Temp (!) 97 1 °F (36 2 °C) (Temporal)   Ht 4' 11" (1 499 m)   Wt 64 9 kg (143 lb 1 6 oz)   SpO2 98%   BMI 28 90 kg/m²     Physical Exam  Vitals and nursing note reviewed  Constitutional:       General: She is not in acute distress  Appearance: She is well-developed  HENT:      Head: Normocephalic and atraumatic  Right Ear: Tympanic membrane normal       Left Ear: Tympanic membrane normal       Nose: Nose normal       Mouth/Throat:      Mouth: Mucous membranes are moist    Eyes:      Conjunctiva/sclera: Conjunctivae normal    Cardiovascular:      Rate and Rhythm: Normal rate and regular rhythm  Heart sounds: No murmur heard  Pulmonary:      Effort: Pulmonary effort is normal  No respiratory distress  Breath sounds: Normal breath sounds  Abdominal:      Palpations: Abdomen is soft  Tenderness: There is no abdominal tenderness  Musculoskeletal:         General: Tenderness present  No swelling  Cervical back: Neck supple  Comments: tenderness at lower rib area, left side   Skin:     General: Skin is warm and dry  Capillary Refill: Capillary refill takes less than 2 seconds  Neurological:      Mental Status: She is alert     Psychiatric:         Mood and Affect: Mood normal           Abilio Schroeder MD

## 2022-11-29 NOTE — PATIENT INSTRUCTIONS
Please get your labwork done fasting  Do not eat/drink for about 8-12 hours prior to getting the labwork done, however you may drink water or black coffee while you are fasting  Please use a [facility] lab if possible, as we receive the lab results more quickly  We will notify you of your labwork results even if they are normal   Please contact us if you do not hear about your lab results after one week  Low Carb Recommendations:  Please follow a low carbohydrate, high protein diet  [employer] is a good armando/computer program to keep food logs  Your meals should be less than 30 grams of carbohydrates  Your snacks should be less than 15 grams of carbohydrates  You should drink at least 64 ounces of water daily  You should walk at least 30 minutes daily  Please get Xray at earliest convenience  Follow up in 3 months  Medicare Preventive Visit Patient Instructions  Thank you for completing your Welcome to Medicare Visit or Medicare Annual Wellness Visit today  Your next wellness visit will be due in one year (11/30/2023)  The screening/preventive services that you may require over the next 5-10 years are detailed below  Some tests may not apply to you based off risk factors and/or age  Screening tests ordered at today's visit but not completed yet may show as past due  Also, please note that scanned in results may not display below  Preventive Screenings:  Service Recommendations Previous Testing/Comments   Colorectal Cancer Screening  * Colonoscopy    * Fecal Occult Blood Test (FOBT)/Fecal Immunochemical Test (FIT)  * Fecal DNA/Cologuard Test  * Flexible Sigmoidoscopy Age: 39-70 years old   Colonoscopy: every 10 years (may be performed more frequently if at higher risk)  OR  FOBT/FIT: every 1 year  OR  Cologuard: every 3 years  OR  Sigmoidoscopy: every 5 years  Screening may be recommended earlier than age 39 if at higher risk for colorectal cancer   Also, an individualized decision between you and your healthcare provider will decide whether screening between the ages of 74-80 would be appropriate  Colonoscopy: 03/21/2019  FOBT/FIT: Not on file  Cologuard: Not on file  Sigmoidoscopy: Not on file          Breast Cancer Screening Age: 36 years old  Frequency: every 1-2 years  Not required if history of left and right mastectomy Mammogram: 03/09/2022        Cervical Cancer Screening Between the ages of 21-29, pap smear recommended once every 3 years  Between the ages of 33-67, can perform pap smear with HPV co-testing every 5 years  Recommendations may differ for women with a history of total hysterectomy, cervical cancer, or abnormal pap smears in past  Pap Smear: 04/20/2022        Hepatitis C Screening Once for adults born between 1945 and 1965  More frequently in patients at high risk for Hepatitis C Hep C Antibody: 03/22/2019        Diabetes Screening 1-2 times per year if you're at risk for diabetes or have pre-diabetes Fasting glucose: 106 mg/dL (8/21/2019)  A1C: 9 2 % (6/18/2022)      Cholesterol Screening Once every 5 years if you don't have a lipid disorder  May order more often based on risk factors  Lipid panel: 02/01/2022          Other Preventive Screenings Covered by Medicare:  Abdominal Aortic Aneurysm (AAA) Screening: covered once if your at risk  You're considered to be at risk if you have a family history of AAA  Lung Cancer Screening: covers low dose CT scan once per year if you meet all of the following conditions: (1) Age 50-69; (2) No signs or symptoms of lung cancer; (3) Current smoker or have quit smoking within the last 15 years; (4) You have a tobacco smoking history of at least 20 pack years (packs per day multiplied by number of years you smoked); (5) You get a written order from a healthcare provider    Glaucoma Screening: covered annually if you're considered high risk: (1) You have diabetes OR (2) Family history of glaucoma OR (3)  aged 48 and older OR (4)  American aged 72 and older  Osteoporosis Screening: covered every 2 years if you meet one of the following conditions: (1) You're estrogen deficient and at risk for osteoporosis based off medical history and other findings; (2) Have a vertebral abnormality; (3) On glucocorticoid therapy for more than 3 months; (4) Have primary hyperparathyroidism; (5) On osteoporosis medications and need to assess response to drug therapy  Last bone density test (DXA Scan): 04/02/2022  HIV Screening: covered annually if you're between the age of 12-76  Also covered annually if you are younger than 13 and older than 72 with risk factors for HIV infection  For pregnant patients, it is covered up to 3 times per pregnancy  Immunizations:  Immunization Recommendations   Influenza Vaccine Annual influenza vaccination during flu season is recommended for all persons aged >= 6 months who do not have contraindications   Pneumococcal Vaccine   * Pneumococcal conjugate vaccine = PCV13 (Prevnar 13), PCV15 (Vaxneuvance), PCV20 (Prevnar 20)  * Pneumococcal polysaccharide vaccine = PPSV23 (Pneumovax) Adults 25-60 years old: 1-3 doses may be recommended based on certain risk factors  Adults 72 years old: 1-2 doses may be recommended based off what pneumonia vaccine you previously received   Hepatitis B Vaccine 3 dose series if at intermediate or high risk (ex: diabetes, end stage renal disease, liver disease)   Tetanus (Td) Vaccine - COST NOT COVERED BY MEDICARE PART B Following completion of primary series, a booster dose should be given every 10 years to maintain immunity against tetanus  Td may also be given as tetanus wound prophylaxis  Tdap Vaccine - COST NOT COVERED BY MEDICARE PART B Recommended at least once for all adults  For pregnant patients, recommended with each pregnancy     Shingles Vaccine (Shingrix) - COST NOT COVERED BY MEDICARE PART B  2 shot series recommended in those aged 48 and above     Health Maintenance Due:      Topic Date Due    Colorectal Cancer Screening  Never done    Breast Cancer Screening: Mammogram  03/09/2023    Hepatitis C Screening  Completed     Immunizations Due:      Topic Date Due    Hepatitis B Vaccine (1 of 3 - 3-dose series) Never done    Pneumococcal Vaccine: 65+ Years (2 - PCV) 01/13/2022    COVID-19 Vaccine (4 - Booster for Pfizer series) 04/17/2022    Influenza Vaccine (1) 09/01/2022     Advance Directives   What are advance directives? Advance directives are legal documents that state your wishes and plans for medical care  These plans are made ahead of time in case you lose your ability to make decisions for yourself  Advance directives can apply to any medical decision, such as the treatments you want, and if you want to donate organs  What are the types of advance directives? There are many types of advance directives, and each state has rules about how to use them  You may choose a combination of any of the following:  Living will: This is a written record of the treatment you want  You can also choose which treatments you do not want, which to limit, and which to stop at a certain time  This includes surgery, medicine, IV fluid, and tube feedings  Durable power of  for healthcare [place] [facility]): This is a written record that states who you want to make healthcare choices for you when you are unable to make them for yourself  This person, called a proxy, is usually a family member or a friend  You may choose more than 1 proxy  Do not resuscitate (DNR) order:  A DNR order is used in case your heart stops beating or you stop breathing  It is a request not to have certain forms of treatment, such as CPR  A DNR order may be included in other types of advance directives  Medical directive: This covers the care that you want if you are in a coma, near death, or unable to make decisions for yourself  You can list the treatments you want for each condition   Treatment may include pain medicine, surgery, blood transfusions, dialysis, IV or tube feedings, and a ventilator (breathing machine)  Values history: This document has questions about your views, beliefs, and how you feel and think about life  This information can help others choose the care that you would choose  Why are advance directives important? An advance directive helps you control your care  Although spoken wishes may be used, it is better to have your wishes written down  Spoken wishes can be misunderstood, or not followed  Treatments may be given even if you do not want them  An advance directive may make it easier for your family to make difficult choices about your care  Cigarette Smoking and Your Health   Risks to your health if you smoke:  Nicotine and other chemicals found in tobacco damage every cell in your body  Even if you are a light smoker, you have an increased risk for cancer, heart disease, and lung disease  If you are pregnant or have diabetes, smoking increases your risk for complications  Benefits to your health if you stop smoking: You decrease respiratory symptoms such as coughing, wheezing, and shortness of breath  You reduce your risk for cancers of the lung, mouth, throat, kidney, bladder, pancreas, stomach, and cervix  If you already have cancer, you increase the benefits of chemotherapy  You also reduce your risk for cancer returning or a second cancer from developing  You reduce your risk for heart disease, blood clots, heart attack, and stroke  You reduce your risk for lung infections, and diseases such as pneumonia, asthma, chronic bronchitis, and emphysema  Your circulation improves  More oxygen can be delivered to your body  If you have diabetes, you lower your risk for complications, such as kidney, artery, and eye diseases  You also lower your risk for nerve damage  Nerve damage can lead to amputations, poor vision, and blindness    You improve your body's ability to heal and to fight infections  For more information and support to stop smoking:   [employer]  Phone: 2- 599 - [phone]  Web Address: www [employer]  Weight Management   Why it is important to manage your weight:  Being overweight increases your risk of health conditions such as heart disease, high blood pressure, type 2 diabetes, and certain types of cancer  It can also increase your risk for osteoarthritis, sleep apnea, and other respiratory problems  Aim for a slow, steady weight loss  Even a small amount of weight loss can lower your risk of health problems  How to lose weight safely:  A safe and healthy way to lose weight is to eat fewer calories and get regular exercise  You can lose up about 1 pound a week by decreasing the number of calories you eat by 500 calories each day  Healthy meal plan for weight management:  A healthy meal plan includes a variety of foods, contains fewer calories, and helps you stay healthy  A healthy meal plan includes the following:  Eat whole-grain foods more often  A healthy meal plan should contain fiber  Fiber is the part of grains, fruits, and vegetables that is not broken down by your body  Whole-grain foods are healthy and provide extra fiber in your diet  Some examples of whole-grain foods are whole-wheat breads and pastas, oatmeal, brown rice, and bulgur  Eat a variety of vegetables every day  Include dark, leafy greens such as spinach, kale, senait greens, and mustard greens  Eat yellow and orange vegetables such as carrots, sweet potatoes, and winter squash  Eat a variety of fruits every day  Choose fresh or canned fruit (canned in its own juice or light syrup) instead of juice  Fruit juice has very little or no fiber  Eat low-fat dairy foods  Drink fat-free (skim) milk or 1% milk  Eat fat-free yogurt and low-fat cottage cheese  Try low-fat cheeses such as mozzarella and other reduced-fat cheeses  Choose meat and other protein foods that are low in fat  Choose beans or other legumes such as split peas or lentils  Choose fish, skinless poultry (chicken or turkey), or lean cuts of red meat (beef or pork)  Before you cook meat or poultry, cut off any visible fat  Use less fat and oil  Try baking foods instead of frying them  Add less fat, such as margarine, sour cream, regular salad dressing and mayonnaise to foods  Eat fewer high-fat foods  Some examples of high-fat foods include french fries, doughnuts, ice cream, and cakes  Eat fewer sweets  Limit foods and drinks that are high in sugar  This includes candy, cookies, regular soda, and sweetened drinks  Exercise:  Exercise at least 30 minutes per day on most days of the week  Some examples of exercise include walking, biking, dancing, and swimming  You can also fit in more physical activity by taking the stairs instead of the elevator or parking farther away from stores  Ask your healthcare provider about the best exercise plan for you  © Copyright [employer] 2018 Information is for End User's use only and may not be sold, redistributed or otherwise used for commercial purposes  All illustrations and images included in CareNotes® are the copyrighted property of [employer]  or [facility] Preventive Visit Patient Instructions  Thank you for completing your Welcome to Medicare Visit or Medicare Annual Wellness Visit today  Your next wellness visit will be due in one year (11/30/2023)  The screening/preventive services that you may require over the next 5-10 years are detailed below  Some tests may not apply to you based off risk factors and/or age  Screening tests ordered at today's visit but not completed yet may show as past due  Also, please note that scanned in results may not display below    Preventive Screenings:  Service Recommendations Previous Testing/Comments   Colorectal Cancer Screening  * Colonoscopy    * Fecal Occult Blood Test (FOBT)/Fecal Immunochemical Test (FIT)  * Fecal DNA/Cologuard Test  * Flexible Sigmoidoscopy Age: 39-70 years old   Colonoscopy: every 10 years (may be performed more frequently if at higher risk)  OR  FOBT/FIT: every 1 year  OR  Cologuard: every 3 years  OR  Sigmoidoscopy: every 5 years  Screening may be recommended earlier than age 39 if at higher risk for colorectal cancer  Also, an individualized decision between you and your healthcare provider will decide whether screening between the ages of 74-80 would be appropriate  Colonoscopy: 03/21/2019  FOBT/FIT: Not on file  Cologuard: Not on file  Sigmoidoscopy: Not on file    Screening Current     Breast Cancer Screening Age: 36 years old  Frequency: every 1-2 years  Not required if history of left and right mastectomy Mammogram: 03/09/2022    Screening Current   Cervical Cancer Screening Between the ages of 21-29, pap smear recommended once every 3 years  Between the ages of 33-67, can perform pap smear with HPV co-testing every 5 years  Recommendations may differ for women with a history of total hysterectomy, cervical cancer, or abnormal pap smears in past  Pap Smear: 04/20/2022    Screening Not Indicated   Hepatitis C Screening Once for adults born between 1945 and 1965  More frequently in patients at high risk for Hepatitis C Hep C Antibody: 03/22/2019    Screening Current   Diabetes Screening 1-2 times per year if you're at risk for diabetes or have pre-diabetes Fasting glucose: 106 mg/dL (8/21/2019)  A1C: 9 2 % (6/18/2022)  Screening Not Indicated  History Diabetes   Cholesterol Screening Once every 5 years if you don't have a lipid disorder  May order more often based on risk factors  Lipid panel: 02/01/2022    Screening Not Indicated  History Lipid Disorder     Other Preventive Screenings Covered by Medicare:  Abdominal Aortic Aneurysm (AAA) Screening: covered once if your at risk  You're considered to be at risk if you have a family history of AAA    Lung Cancer Screening: covers low dose CT scan once per year if you meet all of the following conditions: (1) Age 50-69; (2) No signs or symptoms of lung cancer; (3) Current smoker or have quit smoking within the last 15 years; (4) You have a tobacco smoking history of at least 20 pack years (packs per day multiplied by number of years you smoked); (5) You get a written order from a healthcare provider  Glaucoma Screening: covered annually if you're considered high risk: (1) You have diabetes OR (2) Family history of glaucoma OR (3)  aged 48 and older OR (3)  American aged 72 and older  Osteoporosis Screening: covered every 2 years if you meet one of the following conditions: (1) You're estrogen deficient and at risk for osteoporosis based off medical history and other findings; (2) Have a vertebral abnormality; (3) On glucocorticoid therapy for more than 3 months; (4) Have primary hyperparathyroidism; (5) On osteoporosis medications and need to assess response to drug therapy  Last bone density test (DXA Scan): 04/02/2022  HIV Screening: covered annually if you're between the age of 12-76  Also covered annually if you are younger than 13 and older than 72 with risk factors for HIV infection  For pregnant patients, it is covered up to 3 times per pregnancy      Immunizations:  Immunization Recommendations   Influenza Vaccine Annual influenza vaccination during flu season is recommended for all persons aged >= 6 months who do not have contraindications   Pneumococcal Vaccine   * Pneumococcal conjugate vaccine = PCV13 (Prevnar 13), PCV15 (Vaxneuvance), PCV20 (Prevnar 20)  * Pneumococcal polysaccharide vaccine = PPSV23 (Pneumovax) Adults 25-60 years old: 1-3 doses may be recommended based on certain risk factors  Adults 72 years old: 1-2 doses may be recommended based off what pneumonia vaccine you previously received   Hepatitis B Vaccine 3 dose series if at intermediate or high risk (ex: diabetes, end stage renal disease, liver disease)   Tetanus (Td) Vaccine - COST NOT COVERED BY MEDICARE PART B Following completion of primary series, a booster dose should be given every 10 years to maintain immunity against tetanus  Td may also be given as tetanus wound prophylaxis  Tdap Vaccine - COST NOT COVERED BY MEDICARE PART B Recommended at least once for all adults  For pregnant patients, recommended with each pregnancy  Shingles Vaccine (Shingrix) - COST NOT COVERED BY MEDICARE PART B  2 shot series recommended in those aged 48 and above     Health Maintenance Due:      Topic Date Due    Colorectal Cancer Screening  Never done    Breast Cancer Screening: Mammogram  03/09/2023    Hepatitis C Screening  Completed     Immunizations Due:      Topic Date Due    COVID-19 Vaccine (4 - Booster for Pfizer series) 04/17/2022    Influenza Vaccine (1) 09/01/2022     Advance Directives   What are advance directives? Advance directives are legal documents that state your wishes and plans for medical care  These plans are made ahead of time in case you lose your ability to make decisions for yourself  Advance directives can apply to any medical decision, such as the treatments you want, and if you want to donate organs  What are the types of advance directives? There are many types of advance directives, and each state has rules about how to use them  You may choose a combination of any of the following:  Living will: This is a written record of the treatment you want  You can also choose which treatments you do not want, which to limit, and which to stop at a certain time  This includes surgery, medicine, IV fluid, and tube feedings  Durable power of  for healthcare [place] [facility]): This is a written record that states who you want to make healthcare choices for you when you are unable to make them for yourself  This person, called a proxy, is usually a family member or a friend  You may choose more than 1 proxy    Do not resuscitate (DNR) order:  A DNR order is used in case your heart stops beating or you stop breathing  It is a request not to have certain forms of treatment, such as CPR  A DNR order may be included in other types of advance directives  Medical directive: This covers the care that you want if you are in a coma, near death, or unable to make decisions for yourself  You can list the treatments you want for each condition  Treatment may include pain medicine, surgery, blood transfusions, dialysis, IV or tube feedings, and a ventilator (breathing machine)  Values history: This document has questions about your views, beliefs, and how you feel and think about life  This information can help others choose the care that you would choose  Why are advance directives important? An advance directive helps you control your care  Although spoken wishes may be used, it is better to have your wishes written down  Spoken wishes can be misunderstood, or not followed  Treatments may be given even if you do not want them  An advance directive may make it easier for your family to make difficult choices about your care  Cigarette Smoking and Your Health   Risks to your health if you smoke:  Nicotine and other chemicals found in tobacco damage every cell in your body  Even if you are a light smoker, you have an increased risk for cancer, heart disease, and lung disease  If you are pregnant or have diabetes, smoking increases your risk for complications  Benefits to your health if you stop smoking: You decrease respiratory symptoms such as coughing, wheezing, and shortness of breath  You reduce your risk for cancers of the lung, mouth, throat, kidney, bladder, pancreas, stomach, and cervix  If you already have cancer, you increase the benefits of chemotherapy  You also reduce your risk for cancer returning or a second cancer from developing  You reduce your risk for heart disease, blood clots, heart attack, and stroke     You reduce your risk for lung infections, and diseases such as pneumonia, asthma, chronic bronchitis, and emphysema  Your circulation improves  More oxygen can be delivered to your body  If you have diabetes, you lower your risk for complications, such as kidney, artery, and eye diseases  You also lower your risk for nerve damage  Nerve damage can lead to amputations, poor vision, and blindness  You improve your body's ability to heal and to fight infections  For more information and support to stop smoking:   [employer]  Phone: 7- 637 - [phone]  Web Address: [email or web address]  Weight Management   Why it is important to manage your weight:  Being overweight increases your risk of health conditions such as heart disease, high blood pressure, type 2 diabetes, and certain types of cancer  It can also increase your risk for osteoarthritis, sleep apnea, and other respiratory problems  Aim for a slow, steady weight loss  Even a small amount of weight loss can lower your risk of health problems  How to lose weight safely:  A safe and healthy way to lose weight is to eat fewer calories and get regular exercise  You can lose up about 1 pound a week by decreasing the number of calories you eat by 500 calories each day  Healthy meal plan for weight management:  A healthy meal plan includes a variety of foods, contains fewer calories, and helps you stay healthy  A healthy meal plan includes the following:  Eat whole-grain foods more often  A healthy meal plan should contain fiber  Fiber is the part of grains, fruits, and vegetables that is not broken down by your body  Whole-grain foods are healthy and provide extra fiber in your diet  Some examples of whole-grain foods are whole-wheat breads and pastas, oatmeal, brown rice, and bulgur  Eat a variety of vegetables every day  Include dark, leafy greens such as spinach, kale, senait greens, and mustard greens   Eat yellow and orange vegetables such as carrots, sweet potatoes, and winter squash  Eat a variety of fruits every day  Choose fresh or canned fruit (canned in its own juice or light syrup) instead of juice  Fruit juice has very little or no fiber  Eat low-fat dairy foods  Drink fat-free (skim) milk or 1% milk  Eat fat-free yogurt and low-fat cottage cheese  Try low-fat cheeses such as mozzarella and other reduced-fat cheeses  Choose meat and other protein foods that are low in fat  Choose beans or other legumes such as split peas or lentils  Choose fish, skinless poultry (chicken or turkey), or lean cuts of red meat (beef or pork)  Before you cook meat or poultry, cut off any visible fat  Use less fat and oil  Try baking foods instead of frying them  Add less fat, such as margarine, sour cream, regular salad dressing and mayonnaise to foods  Eat fewer high-fat foods  Some examples of high-fat foods include french fries, doughnuts, ice cream, and cakes  Eat fewer sweets  Limit foods and drinks that are high in sugar  This includes candy, cookies, regular soda, and sweetened drinks  Exercise:  Exercise at least 30 minutes per day on most days of the week  Some examples of exercise include walking, biking, dancing, and swimming  You can also fit in more physical activity by taking the stairs instead of the elevator or parking farther away from stores  Ask your healthcare provider about the best exercise plan for you  © Copyright [employer] 2018 Information is for End User's use only and may not be sold, redistributed or otherwise used for commercial purposes   All illustrations and images included in CareNotes® are the copyrighted property of A D A M , Inc  or [address] [place] [address] [employer]

## 2022-12-06 ENCOUNTER — VBI (OUTPATIENT)
Dept: ADMINISTRATIVE | Facility: OTHER | Age: 67
End: 2022-12-06

## 2022-12-20 ENCOUNTER — VBI (OUTPATIENT)
Dept: ADMINISTRATIVE | Facility: OTHER | Age: 67
End: 2022-12-20

## 2022-12-29 ENCOUNTER — VBI (OUTPATIENT)
Dept: ADMINISTRATIVE | Facility: OTHER | Age: 67
End: 2022-12-29

## 2023-01-09 ENCOUNTER — VBI (OUTPATIENT)
Dept: ADMINISTRATIVE | Facility: OTHER | Age: 68
End: 2023-01-09

## 2023-01-09 NOTE — TELEPHONE ENCOUNTER
01/09/23 8:49 AM     VB [employer] SmartForm used to document caregap status      [facility]

## 2023-01-11 ENCOUNTER — VBI (OUTPATIENT)
Dept: ADMINISTRATIVE | Facility: OTHER | Age: 68
End: 2023-01-11

## 2023-02-21 ENCOUNTER — RA CDI HCC (OUTPATIENT)
Dept: OTHER | Facility: HOSPITAL | Age: 68
End: 2023-02-21

## 2023-02-21 NOTE — PROGRESS NOTES
[facility]  coding opportunities          Chart Reviewed number of suggestions sent to Provider: 4   e11 22  E11 65  E11 51  [id number]    This is a reminder to address ALL HCC (risk adjustment) codes as found on active problem list for 2023 as patient scores reset to zero ANDREZ    Patients Insurance     Medicare Insurance: [employer] Advantage

## 2023-03-22 ENCOUNTER — VBI (OUTPATIENT)
Dept: ADMINISTRATIVE | Facility: OTHER | Age: 68
End: 2023-03-22

## 2023-05-13 ENCOUNTER — OFFICE VISIT (OUTPATIENT)
Dept: URGENT CARE | Age: 68
End: 2023-05-13

## 2023-05-13 VITALS
HEART RATE: 84 BPM | TEMPERATURE: 97 F | OXYGEN SATURATION: 98 % | SYSTOLIC BLOOD PRESSURE: 130 MMHG | DIASTOLIC BLOOD PRESSURE: 70 MMHG | RESPIRATION RATE: 15 BRPM

## 2023-05-13 DIAGNOSIS — J01.40 ACUTE PANSINUSITIS, RECURRENCE NOT SPECIFIED: Primary | ICD-10-CM

## 2023-05-13 LAB — GLUCOSE SERPL-MCNC: 279 MG/DL (ref 65–140)

## 2023-05-13 RX ORDER — PEN NEEDLE, DIABETIC 32GX 5/32"
NEEDLE, DISPOSABLE MISCELLANEOUS
COMMUNITY
Start: 2023-03-09

## 2023-05-13 RX ORDER — CALCIUM CARBONATE/VITAMIN D3 500MG-5MCG
1 TABLET ORAL 2 TIMES DAILY WITH MEALS
COMMUNITY
Start: 2023-05-09

## 2023-05-13 RX ORDER — AMLODIPINE AND OLMESARTAN MEDOXOMIL 10; 40 MG/1; MG/1
1 TABLET ORAL EVERY EVENING
COMMUNITY
Start: 2023-05-08

## 2023-05-13 RX ORDER — ROSUVASTATIN CALCIUM 40 MG/1
TABLET, COATED ORAL
COMMUNITY
Start: 2023-05-08

## 2023-05-13 RX ORDER — METFORMIN HYDROCHLORIDE 750 MG/1
TABLET, EXTENDED RELEASE ORAL
COMMUNITY
Start: 2023-05-08

## 2023-05-13 RX ORDER — DULAGLUTIDE 4.5 MG/.5ML
INJECTION, SOLUTION SUBCUTANEOUS
COMMUNITY
Start: 2023-05-09

## 2023-05-13 RX ORDER — SYRINGE AND NEEDLE,INSULIN,1ML 31 GX5/16"
SYRINGE, EMPTY DISPOSABLE MISCELLANEOUS
COMMUNITY
Start: 2023-02-13

## 2023-05-13 RX ORDER — DOXYCYCLINE 100 MG/1
100 TABLET ORAL 2 TIMES DAILY
Qty: 14 TABLET | Refills: 0 | Status: SHIPPED | OUTPATIENT
Start: 2023-05-13 | End: 2023-05-20

## 2023-05-13 RX ORDER — EZETIMIBE 10 MG/1
10 TABLET ORAL DAILY
COMMUNITY
Start: 2023-05-08

## 2023-05-13 RX ORDER — METFORMIN HYDROCHLORIDE 750 MG/1
750 TABLET, EXTENDED RELEASE ORAL 2 TIMES DAILY
COMMUNITY
Start: 2023-03-08

## 2023-05-13 NOTE — PROGRESS NOTES
[facility]        NAME: Karen Valadez is a 79 y o  female  : 1955    MRN: [id number]  DATE: May 13, 2023  TIME: 11:25 AM      Assessment and Plan     Acute pansinusitis, recurrence not specified [J01 40]  1  Acute pansinusitis, recurrence not specified  doxycycline (ADOXA) 100 MG tablet          poct glucose 279 mg/dl  Patient educated and verbalizes understanding to proceed to the ER if symptoms worsen  Patient Instructions     Take antibiotic as prescribed  Recommend probiotic use while taking antibiotic  Avoid direct sunlight with use of doxycyline, reapply SPF 50 at least every 1-2 hours, wear long sleeves, and a wide brimmed hat  Recommend OTC cough medication coricidin  Recommend nasal saline spray  Recommend humidifier  PCP follow-up in 3-5 days  Proceed to the ER if symptoms worsen  Chief Complaint     Chief Complaint   Patient presents with   • Cold Like Symptoms     Pt reports sinus pressure, headache, cough, PND x4 days w/ multiple negative COVID tests  History of Present Illness     Patient is a 24-year-old female who presents with congestion for 4 days  Reports sinus pain and pressure with postnasal drip  Reports yellow drainage from her nose  Reports headache  Reports vomiting 2 days ago that has since resolved  Denies diarrhea  Reports cough  States cough fluctuates between wet and dry  Denies shortness of breath or wheezing  Patient is diabetic  States she did not check her blood sugar this morning or take her insulin as she has not eaten yet  States her blood sugars have been running high in the 300s  Denies increased thirst, hunger, or urination  Review of Systems     Review of Systems   Constitutional: Negative for chills and fever  HENT: Positive for congestion, postnasal drip, sinus pressure and sinus pain  Respiratory: Positive for cough  Negative for shortness of breath and wheezing      Gastrointestinal: Negative for diarrhea, nausea and vomiting  Endocrine: Negative for polydipsia, polyphagia and polyuria  Neurological: Positive for headaches  All other systems reviewed and are negative  Current Medications       Current Outpatient Medications:   •  doxycycline (ADOXA) 100 MG tablet, Take 1 tablet (100 mg total) by mouth 2 (two) times a day for 7 days, Disp: 14 tablet, Rfl: 0  •  metFORMIN (GLUCOPHAGE-XR) 750 mg 24 hr tablet, Take 1 tablet po BID, Disp: , Rfl:   •  acetaminophen (TYLENOL) 500 mg tablet, every 8 (eight) hours (Patient not taking: Reported on 11/29/2022), Disp: , Rfl:   •  amlodipine-olmesartan (BRIANNA) 10-40 MG, Take 1 tablet by mouth every evening, Disp: , Rfl:   •  amlodipine-olmesartan (BRIANNA) 5-40 MG, amlodipine 5 mg-olmesartan 40 mg tablet  TAKE 1 TABLET BY MOUTH EVERYDAY AT BEDTIME, Disp: , Rfl:   •  BD Pen Needle Veda 2nd Gen 32G X 4 MM MISC, 1 STICK BY MISCELLANEOUS ROUTE 4 TIMES DAILY  , Disp: , Rfl:   •  Continuous Blood Gluc Sensor (Enlite Glucose Sensor) MISC, Use 1 each every 14 (fourteen) days, Disp: , Rfl:   •  Continuous Blood Gluc Sensor ([employer] System) MISC, FreeStyle Maddy 2 Sensor kit, Disp: , Rfl:   •  denosumab (PROLIA) 60 mg/mL, Inject 60mg sc every 6 months (Patient not taking: Reported on 5/13/2023), Disp: , Rfl:   •  Difluprednate (Durezol) 0 05 % EMUL, Durezol 0 05 % eye drops, Disp: , Rfl:   •  ergocalciferol (VITAMIN D2) 50,000 units, Take 50,000 Units by mouth, Disp: , Rfl:   •  ezetimibe (ZETIA) 10 mg tablet, Take 10 mg by mouth daily, Disp: , Rfl:   •  fluticasone (FLONASE) 50 mcg/act nasal spray, 2 sprays into each nostril daily, Disp: 16 g, Rfl: 3  •  gabapentin (NEURONTIN) 300 mg capsule, Take 1 capsule (300 mg total) by mouth 3 (three) times a day, Disp: 90 capsule, Rfl: 0  •  insulin aspart (NOVOLOG FLEXPEN) 100 Units/mL injection pen, Inject 10 Units under the skin 3 (three) times a day with meals for 30 days, Disp: 3 pen, Rfl: 0  •  Insulin Disposable Pump "(Omnipod 5 G6 Pod, Gen 5,) MISC, Omnipod Dash 5 Pack Insulin Pod subcutaneous cartridge  USE AS DIRECTED DAILY, Disp: , Rfl:   •  loratadine (CLARITIN) 10 mg tablet, Take 1 tablet (10 mg total) by mouth daily, Disp: 30 tablet, Rfl: 0  •  metFORMIN (GLUCOPHAGE-XR) 750 mg 24 hr tablet, Take 750 mg by mouth 2 (two) times a day, Disp: , Rfl:   •  omega-3-acid ethyl esters (LOVAZA) 1 g capsule, Take 2 g by mouth 2 (two) times a day  , Disp: , Rfl:   •  omeprazole (PriLOSEC) 20 mg delayed release capsule, TAKE 1 CAPSULE BY MOUTH EVERY DAY, Disp: 90 capsule, Rfl: 1  •  OYSCO 500 + D 500-5 MG-MCG TABS, Take 1 tablet by mouth 2 (two) times a day with meals, Disp: , Rfl:   •  rosuvastatin (CRESTOR) 40 MG tablet, TAKE 1 TABLET BY MOUTH EVERY DAY AT NIGHT, Disp: , Rfl:   •  Trulicity 1 5 KA/3 8DE SOPN, , Disp: , Rfl:   •  Trulicity 4 5 AY/5 7ZS injection, INJECT 4 5 MG SUBCUTANEOUSLY EVERY 7 DAYS, Disp: , Rfl:   •  UltiCare Insulin Syringe 31G X 5/16\" 0 3 ML MISC, , Disp: , Rfl:     Current Allergies     Allergies as of 05/13/2023 - Reviewed 05/13/2023   Allergen Reaction Noted   • Oxycodone Other (See Comments) 01/13/2021              The following portions of the patient's history were reviewed and updated as appropriate: allergies, current medications, past family history, past medical history, past social history, past surgical history and problem list      Past Medical History:   Diagnosis Date   • Absent pulse     DP pulse B/L   • Depression    • Diabetes (HCC)    • HLD (hyperlipidemia)    • Neuropathy    • Osteopenia    • Prominence of large joints     (L) SC   • Weight loss        Past Surgical History:   Procedure Laterality Date   • EYE SURGERY  2018   • TRIGGER FINGER RELEASE Left 03/12/2021       Family History   Problem Relation Age of Onset   • Alzheimer's disease Mother    • Heart attack Father    • Diabetes Sister    • No Known Problems Daughter    • No Known Problems Maternal Grandmother    • No Known Problems " Paternal Grandmother    • No Known Problems Sister    • No Known Problems Sister    • No Known Problems Sister    • No Known Problems Sister    • No Known Problems Sister    • No Known Problems Sister    • No Known Problems Daughter          Medications have been verified  Objective     /70   Pulse 84   Temp (!) 97 °F (36 1 °C)   Resp 15   SpO2 98%   No LMP recorded  Patient is postmenopausal          Physical Exam     Physical Exam  Vitals and nursing note reviewed  Constitutional:       General: She is awake  She is not in acute distress  Appearance: Normal appearance  She is not ill-appearing, toxic-appearing or diaphoretic  HENT:      Right Ear: Tympanic membrane, ear canal and external ear normal       Left Ear: Tympanic membrane, ear canal and external ear normal       Nose: Congestion present  Right Sinus: Maxillary sinus tenderness and frontal sinus tenderness present  Left Sinus: Maxillary sinus tenderness and frontal sinus tenderness present  Mouth/Throat:      Lips: Pink  Mouth: Mucous membranes are moist       Pharynx: Oropharynx is clear  Uvula midline  No oropharyngeal exudate or posterior oropharyngeal erythema  Cardiovascular:      Rate and Rhythm: Normal rate  Pulses: Normal pulses  Heart sounds: Normal heart sounds, S1 normal and S2 normal    Pulmonary:      Effort: Pulmonary effort is normal       Breath sounds: Normal breath sounds and air entry  Skin:     General: Skin is warm  Capillary Refill: Capillary refill takes less than 2 seconds  Neurological:      Mental Status: She is alert  Psychiatric:         Mood and Affect: Mood normal          Behavior: Behavior normal          Thought Content:  Thought content normal          Judgment: Judgment normal

## 2023-05-13 NOTE — PATIENT INSTRUCTIONS
Take antibiotic as prescribed  Recommend probiotic use while taking antibiotic  Avoid direct sunlight with use of doxycyline, reapply SPF 50 at least every 1-2 hours, wear long sleeves, and a wide brimmed hat  Recommend OTC cough medication coricidin  Recommend nasal saline spray  Recommend humidifier  PCP follow-up in 3-5 days  Proceed to the ER if symptoms worsen

## 2023-05-18 ENCOUNTER — VBI (OUTPATIENT)
Dept: ADMINISTRATIVE | Facility: OTHER | Age: 68
End: 2023-05-18

## 2023-05-23 ENCOUNTER — OFFICE VISIT (OUTPATIENT)
Dept: FAMILY MEDICINE CLINIC | Facility: CLINIC | Age: 68
End: 2023-05-23
Payer: COMMERCIAL

## 2023-05-23 VITALS
TEMPERATURE: 97.2 F | WEIGHT: 145.5 LBS | OXYGEN SATURATION: 97 % | SYSTOLIC BLOOD PRESSURE: 124 MMHG | HEIGHT: 59 IN | DIASTOLIC BLOOD PRESSURE: 60 MMHG | HEART RATE: 88 BPM | BODY MASS INDEX: 29.33 KG/M2

## 2023-05-23 DIAGNOSIS — N18.31 STAGE 3A CHRONIC KIDNEY DISEASE (HCC): ICD-10-CM

## 2023-05-23 DIAGNOSIS — I74.3 EMBOLISM AND THROMBOSIS OF ARTERIES OF THE LOWER EXTREMITIES (HCC): ICD-10-CM

## 2023-05-23 DIAGNOSIS — E11.42 TYPE 2 DIABETES MELLITUS WITH POLYNEUROPATHY (HCC): ICD-10-CM

## 2023-05-23 DIAGNOSIS — E11.65 TYPE 2 DIABETES MELLITUS WITH HYPERGLYCEMIA, WITH LONG-TERM CURRENT USE OF INSULIN (HCC): ICD-10-CM

## 2023-05-23 DIAGNOSIS — Z12.31 ENCOUNTER FOR SCREENING MAMMOGRAM FOR MALIGNANT NEOPLASM OF BREAST: ICD-10-CM

## 2023-05-23 DIAGNOSIS — R42 DIZZINESS: Primary | ICD-10-CM

## 2023-05-23 DIAGNOSIS — I10 ESSENTIAL HYPERTENSION: ICD-10-CM

## 2023-05-23 DIAGNOSIS — Z79.4 TYPE 2 DIABETES MELLITUS WITH HYPERGLYCEMIA, WITH LONG-TERM CURRENT USE OF INSULIN (HCC): ICD-10-CM

## 2023-05-23 PROCEDURE — 99214 OFFICE O/P EST MOD 30 MIN: CPT | Performed by: FAMILY MEDICINE

## 2023-05-23 NOTE — PATIENT INSTRUCTIONS
Please increase water intake  Stand up slowly and wait 10-15 seconds before moving  Watch diet, carbs, sugars  Need follow up here

## 2023-05-23 NOTE — PROGRESS NOTES
Patient's shoes and socks removed  Right Foot/Ankle   Right Foot Inspection  Skin Exam: skin normal and skin intact  No dry skin, no warmth, no callus, no erythema, no maceration, no abnormal color, no pre-ulcer, no ulcer and no callus  Toe Exam: ROM and strength within normal limits  Left Foot/Ankle  Left Foot Inspection  Skin Exam: skin normal and skin intact  No dry skin, no warmth, no erythema, no maceration, normal color, no pre-ulcer, no ulcer and no callus  Toe Exam: ROM and strength within normal limits  Assign Risk Category  No deformity present  No loss of protective sensation  No weak pulses  Risk: 0     Subjective:    HPI  Patty Diggs is a 79 y o  female here today for:  Chief Complaint   Patient presents with   • Dizziness     Patient states that she is having neck pain and feeling dizzy     ---Above per clinical staff & reviewed   ---  HPI:  70yof here with above complaints  Has been going a few weeks  Discussed water intake- not drinking much per pt  BP and vitals good  Discussed standing up slowly before moving and increasing water  Pt is overdue for regular follow up visit- discussed importance of coming in regularly   Discussed importance of taking meds as directed due to poorly controlled diabetes  Discussed diet and decrease carbs and sugars  Recommended close follow up    The following portions of the patient's history were reviewed and updated as appropriate: allergies, current medications, past family history, past medical history, past social history, past surgical history and problem list     Past Medical History:   Diagnosis Date   • Absent pulse     DP pulse B/L   • Depression    • Diabetes ([address] )    • HLD (hyperlipidemia)    • Neuropathy    • Osteopenia    • Prominence of large joints     (L) SC   • Weight loss        Past Surgical History:   Procedure Laterality Date   • EYE SURGERY  2018   • TRIGGER FINGER RELEASE Left 03/12/2021       Social History     Socioeconomic History   • Marital status: /Civil Union     Spouse name: None   • Number of children: None   • Years of education: None   • Highest education level: None   Occupational History   • Occupation:  [employer]   Tobacco Use   • Smoking status: Every Day     Packs/day: 0 50     Years: 20 00     Total pack years: 10 00     Types: Cigarettes   • Smokeless tobacco: Never   Vaping Use   • Vaping Use: Never used   Substance and Sexual Activity   • Alcohol use: Not Currently   • Drug use: Never   • Sexual activity: Not Currently     Birth control/protection: Post-menopausal   Other Topics Concern   • None   Social History Narrative   • None     Social Determinants of Health     Financial Resource Strain: Low Risk  (11/29/2022)    Overall Financial Resource Strain (CARDIA)    • Difficulty of Paying Living Expenses: Not hard at all   Food Insecurity: Not on file   Transportation Needs: No Transportation Needs (11/29/2022)    PRAPARE - Transportation    • Lack of Transportation (Medical): No    • Lack of Transportation (Non-Medical): No   Physical Activity: Not on file   Stress: Not on file   Social Connections: Not on file   Intimate Partner Violence: Not on file   Housing Stability: Not on file       Current Outpatient Medications   Medication Sig Dispense Refill   • acetaminophen (TYLENOL) 500 mg tablet every 8 (eight) hours     • amlodipine-olmesartan (BRIANNA) 10-40 MG Take 1 tablet by mouth every evening     • amlodipine-olmesartan (BRIANNA) 5-40 MG amlodipine 5 mg-olmesartan 40 mg tablet   TAKE 1 TABLET BY MOUTH EVERYDAY AT BEDTIME     • BD Pen Needle Veda 2nd Gen 32G X 4 MM [facility] 1 STICK BY MISCELLANEOUS ROUTE 4 TIMES DAILY       • Continuous Blood Gluc Sensor (Enlite Glucose Sensor) MISC Use 1 each every 14 (fourteen) days     • Continuous Blood Gluc Sensor (FreeStyle [employer] Sensor System) MISC FreeStyle Maddy 2 Sensor kit     • ergocalciferol (VITAMIN D2) 50,000 units Take 50,000 Units by mouth     • ezetimibe "(ZETIA) 10 mg tablet Take 10 mg by mouth daily     • gabapentin (NEURONTIN) 300 mg capsule Take 1 capsule (300 mg total) by mouth 3 (three) times a day 90 capsule 0   • insulin aspart (NOVOLOG FLEXPEN) 100 Units/mL injection pen Inject 10 Units under the skin 3 (three) times a day with meals for 30 days 3 pen 0   • loratadine (CLARITIN) 10 mg tablet Take 1 tablet (10 mg total) by mouth daily 30 tablet 0   • metFORMIN (GLUCOPHAGE-XR) 750 mg 24 hr tablet Take 1 tablet po BID     • omega-3-acid ethyl esters (LOVAZA) 1 g capsule Take 2 g by mouth 2 (two) times a day       • omeprazole (PriLOSEC) 20 mg delayed release capsule TAKE 1 CAPSULE BY MOUTH EVERY DAY 90 capsule 1   • OYSCO 500 + D 500-5 MG-MCG TABS Take 1 tablet by mouth 2 (two) times a day with meals     • rosuvastatin (CRESTOR) 40 MG tablet TAKE 1 TABLET BY MOUTH EVERY DAY AT NIGHT     • Trulicity 4 5 KO/6 4KX injection INJECT 4 5 MG SUBCUTANEOUSLY EVERY 7 DAYS     • UltiCare Insulin Syringe 31G X 5/16\" 0 3 ML MISC      • denosumab (PROLIA) 60 mg/mL Inject 60mg sc every 6 months (Patient not taking: Reported on 5/13/2023)     • Difluprednate (Durezol) 0 05 % EMUL Durezol 0 05 % eye drops (Patient not taking: Reported on 5/23/2023)     • fluticasone (FLONASE) 50 mcg/act nasal spray 2 sprays into each nostril daily (Patient not taking: Reported on 5/23/2023) 16 g 3   • Insulin Disposable Pump (Omnipod 5 G6 Pod, Gen 5,) MISC Omnipod Dash 5 Pack Insulin Pod subcutaneous cartridge   USE AS DIRECTED DAILY (Patient not taking: Reported on 5/23/2023)     • metFORMIN (GLUCOPHAGE-XR) 750 mg 24 hr tablet Take 750 mg by mouth 2 (two) times a day     • Trulicity 1 5 VC/3 9UK SOPN        No current facility-administered medications for this visit  Review of Systems   Constitutional: Negative  Negative for chills and fever  HENT: Negative  Negative for ear pain and sore throat  Eyes: Negative for pain and visual disturbance  Respiratory: Negative    Negative " "for cough and shortness of breath  Cardiovascular: Negative  Negative for chest pain and palpitations  Gastrointestinal: Negative  Negative for abdominal pain and vomiting  Genitourinary: Negative  Negative for dysuria and hematuria  Musculoskeletal: Negative for arthralgias and back pain  Skin: Negative for color change and rash  Neurological: Positive for dizziness  Negative for seizures and syncope  Psychiatric/Behavioral: Negative  Objective:    /60 (BP Location: Left arm, Patient Position: Sitting, Cuff Size: Adult)   Pulse 88   Temp (!) 97 2 °F (36 2 °C) (Temporal)   Ht 4' 11\" (1 499 m)   Wt 66 kg (145 lb 8 oz)   SpO2 97%   BMI 29 39 kg/m²   Wt Readings from Last 3 Encounters:   05/23/23 66 kg (145 lb 8 oz)   02/03/23 64 9 kg (143 lb)   11/29/22 64 9 kg (143 lb 1 6 oz)     BP Readings from Last 3 Encounters:   05/23/23 124/60   05/13/23 130/70   11/29/22 149/69       Lab Results   Component Value Date    ALT 22 04/12/2022    AST 14 04/12/2022    BUN 12 04/12/2022     04/12/2022    CO2 28 04/12/2022    CREATININE 0 75 04/12/2022    GLUF 106 (H) 08/21/2019    HCT 37 0 01/31/2022    HDL 44 08/17/2018    HGB 13 0 01/31/2022    HGBA1C 11 0 (H) 04/18/2023    K 4 7 04/12/2022     01/31/2022    TRIG 118 08/17/2018    TSH 1 96 09/02/2017    WBC 7 70 01/31/2022       Physical Exam  Vitals and nursing note reviewed  Constitutional:       Appearance: Normal appearance  She is well-developed  HENT:      Head: Normocephalic and atraumatic  Eyes:      Pupils: Pupils are equal, round, and reactive to light  Cardiovascular:      Rate and Rhythm: Normal rate and regular rhythm  Pulses: no weak pulses     Heart sounds: No murmur heard  Pulmonary:      Effort: Pulmonary effort is normal       Breath sounds: Normal breath sounds  Musculoskeletal:      Cervical back: Normal range of motion and neck supple     Feet:      Right foot:      Skin integrity: No ulcer, skin " breakdown, erythema, warmth, callus or dry skin  Left foot:      Skin integrity: No ulcer, skin breakdown, erythema, warmth, callus or dry skin  Skin:     General: Skin is warm  Capillary Refill: Capillary refill takes less than 2 seconds  Neurological:      Mental Status: She is alert and oriented to person, place, and time  Cranial Nerves: No cranial nerve deficit  Psychiatric:         Mood and Affect: Mood normal          Thought Content: Thought content normal                        Assessment/Plan:   Jessi Bertrand was seen today for dizziness  Diagnoses and all orders for this visit:    Dizziness    Type 2 diabetes mellitus with polyneuropathy ([address] )  -     Ambulatory Referral to Ophthalmology; Future    Encounter for screening mammogram for malignant neoplasm of breast  -     Mammo screening bilateral w 3d & cad; Future    Embolism and thrombosis of arteries of the lower extremities ([address] )    Stage 3a chronic kidney disease ([address] )    Type 2 diabetes mellitus with hyperglycemia, with long-term current use of insulin ([address] )    Essential hypertension      Return in about 4 weeks (around 6/20/2023) for Recheck  Patient Instructions   Please increase water intake  Stand up slowly and wait 10-15 seconds before moving  Watch diet, carbs, sugars  Need follow up here

## 2023-06-13 ENCOUNTER — RA CDI HCC (OUTPATIENT)
Dept: OTHER | Facility: HOSPITAL | Age: 68
End: 2023-06-13

## 2023-06-13 PROBLEM — I74.3 EMBOLISM AND THROMBOSIS OF ARTERIES OF THE LOWER EXTREMITIES (HCC): Status: ACTIVE | Noted: 2023-06-13

## 2023-06-13 PROBLEM — N18.31 STAGE 3A CHRONIC KIDNEY DISEASE (HCC): Status: ACTIVE | Noted: 2023-06-13

## 2023-06-13 NOTE — PROGRESS NOTES
[address]  coding opportunities          Chart Reviewed number of suggestions sent to Provider: 4   E11 22  E11 65  E11 51  [id number]      Patients Insurance     Medicare Insurance: [employer]

## 2023-06-20 ENCOUNTER — OFFICE VISIT (OUTPATIENT)
Dept: FAMILY MEDICINE CLINIC | Facility: CLINIC | Age: 68
End: 2023-06-20
Payer: COMMERCIAL

## 2023-06-20 VITALS
TEMPERATURE: 97.1 F | DIASTOLIC BLOOD PRESSURE: 72 MMHG | BODY MASS INDEX: 28.1 KG/M2 | HEIGHT: 59 IN | HEART RATE: 89 BPM | SYSTOLIC BLOOD PRESSURE: 124 MMHG | OXYGEN SATURATION: 97 % | WEIGHT: 139.4 LBS

## 2023-06-20 DIAGNOSIS — E11.42 TYPE 2 DIABETES MELLITUS WITH POLYNEUROPATHY (HCC): ICD-10-CM

## 2023-06-20 DIAGNOSIS — M81.0 AGE-RELATED OSTEOPOROSIS WITHOUT CURRENT PATHOLOGICAL FRACTURE: ICD-10-CM

## 2023-06-20 DIAGNOSIS — H69.83 ETD (EUSTACHIAN TUBE DYSFUNCTION), BILATERAL: ICD-10-CM

## 2023-06-20 DIAGNOSIS — J30.1 SEASONAL ALLERGIC RHINITIS DUE TO POLLEN: ICD-10-CM

## 2023-06-20 DIAGNOSIS — R42 DIZZINESS: ICD-10-CM

## 2023-06-20 DIAGNOSIS — I10 ESSENTIAL HYPERTENSION: Primary | ICD-10-CM

## 2023-06-20 DIAGNOSIS — R55 SYNCOPE, UNSPECIFIED SYNCOPE TYPE: ICD-10-CM

## 2023-06-20 DIAGNOSIS — H93.8X2 BLOCKED EAR, LEFT: ICD-10-CM

## 2023-06-20 PROCEDURE — 99214 OFFICE O/P EST MOD 30 MIN: CPT | Performed by: FAMILY MEDICINE

## 2023-06-20 RX ORDER — FLUTICASONE PROPIONATE 50 MCG
2 SPRAY, SUSPENSION (ML) NASAL DAILY
Qty: 16 G | Refills: 3 | Status: SHIPPED | OUTPATIENT
Start: 2023-06-20

## 2023-06-20 NOTE — PROGRESS NOTES
Subjective:    HPI  Mini Carrero is a 79 y o  female here today for:  Chief Complaint   Patient presents with   • Follow-up          ---Above per clinical staff & reviewed   ---  HPI:  70yof here for follow up  Pt has increase water intake and getting up slower  Pt has not had any more issues with dizziness  Pt not taking the Prolia any longer due to insurance and cost- discussed calcium and vitamin D intake  Follow up endocrine at [facility] regularly- stressed importance of this again due to DM being uncontrolled  C/o allergies and rhinorrhea  Not using Flonase- will refill  Also discussed changing medication to different one       The following portions of the patient's history were reviewed and updated as appropriate: allergies, current medications, past family history, past medical history, past social history, past surgical history and problem list     Past Medical History:   Diagnosis Date   • Absent pulse     DP pulse B/L   • Depression    • Diabetes (HCC)    • HLD (hyperlipidemia)    • Neuropathy    • Osteopenia    • Prominence of large joints     (L) SC   • Weight loss        Past Surgical History:   Procedure Laterality Date   • EYE SURGERY  2018   • TRIGGER FINGER RELEASE Left 03/12/2021       Social History     Socioeconomic History   • Marital status: /Civil Union     Spouse name: None   • Number of children: None   • Years of education: None   • Highest education level: None   Occupational History   • Occupation:  [employer]   Tobacco Use   • Smoking status: Every Day     Packs/day: 0 50     Years: 20 00     Total pack years: 10 00     Types: Cigarettes   • Smokeless tobacco: Never   Vaping Use   • Vaping Use: Never used   Substance and Sexual Activity   • Alcohol use: Not Currently   • Drug use: Never   • Sexual activity: Not Currently     Birth control/protection: Post-menopausal   Other Topics Concern   • None   Social History Narrative   • None     Social Determinants of Health     Financial Resource Strain: Low Risk  (11/29/2022)    Overall Financial Resource Strain (CARDIA)    • Difficulty of Paying Living Expenses: Not hard at all   Food Insecurity: Not on file   Transportation Needs: No Transportation Needs (11/29/2022)    PRAPARE - Transportation    • Lack of Transportation (Medical): No    • Lack of Transportation (Non-Medical): No   Physical Activity: Not on file   Stress: Not on file   Social Connections: Not on file   Intimate Partner Violence: Not on file   Housing Stability: Not on file       Current Outpatient Medications   Medication Sig Dispense Refill   • acetaminophen (TYLENOL) 500 mg tablet every 8 (eight) hours     • amlodipine-olmesartan (BRIANNA) 10-40 MG Take 1 tablet by mouth every evening     • BD Pen Needle Veda 2nd Gen 32G X 4 MM [facility] [place] 1 STICK BY MISCELLANEOUS ROUTE 4 TIMES DAILY       • Continuous Blood Gluc Sensor (Enlite Glucose Sensor) MISC Use 1 each every 14 (fourteen) days     • Continuous Blood Gluc Sensor (FreeStyle [employer] Sensor System) MISC FreeStyle Maddy 2 Sensor kit     • ergocalciferol (VITAMIN D2) 50,000 units Take 50,000 Units by mouth     • ezetimibe (ZETIA) 10 mg tablet Take 10 mg by mouth daily     • fluticasone (FLONASE) 50 mcg/act nasal spray 2 sprays into each nostril daily 16 g 3   • gabapentin (NEURONTIN) 300 mg capsule Take 1 capsule (300 mg total) by mouth 3 (three) times a day 90 capsule 0   • loratadine (CLARITIN) 10 mg tablet Take 1 tablet (10 mg total) by mouth daily 30 tablet 0   • metFORMIN (GLUCOPHAGE-XR) 750 mg 24 hr tablet Take 750 mg by mouth 2 (two) times a day     • omega-3-acid ethyl esters (LOVAZA) 1 g capsule Take 2 g by mouth 2 (two) times a day       • omeprazole (PriLOSEC) 20 mg delayed release capsule TAKE 1 CAPSULE BY MOUTH EVERY DAY 90 capsule 1   • OYSCO 500 + D 500-5 MG-MCG TABS Take 1 tablet by mouth 2 (two) times a day with meals     • rosuvastatin (CRESTOR) 40 MG tablet TAKE 1 TABLET BY MOUTH EVERY DAY AT NIGHT     • Trulicity 4 5 "MG/0 5ML injection INJECT 4 5 MG SUBCUTANEOUSLY EVERY 7 DAYS     • UltiCare Insulin Syringe 31G X 5/16\" 0 3 ML MISC      • amlodipine-olmesartan (BRIANNA) 5-40 MG amlodipine 5 mg-olmesartan 40 mg tablet   TAKE 1 TABLET BY MOUTH EVERYDAY AT BEDTIME (Patient not taking: Reported on 6/20/2023)     • Difluprednate (Durezol) 0 05 % EMUL Durezol 0 05 % eye drops (Patient not taking: Reported on 5/23/2023)     • insulin aspart (NOVOLOG FLEXPEN) 100 Units/mL injection pen Inject 10 Units under the skin 3 (three) times a day with meals for 30 days 3 pen 0   • Insulin Disposable Pump (Omnipod 5 G6 Pod, Gen 5,) MISC Omnipod Dash 5 Pack Insulin Pod subcutaneous cartridge   USE AS DIRECTED DAILY (Patient not taking: Reported on 5/23/2023)     • metFORMIN (GLUCOPHAGE-XR) 750 mg 24 hr tablet Take 1 tablet po BID     • Trulicity 1 5 MI/7 7YL SOPN  (Patient not taking: Reported on 6/20/2023)       No current facility-administered medications for this visit  Review of Systems   Constitutional: Negative  Negative for chills and fever  HENT: Positive for postnasal drip and rhinorrhea  Negative for ear pain and sore throat  Eyes: Negative for pain and visual disturbance  Respiratory: Positive for cough  Negative for shortness of breath  Cardiovascular: Negative  Negative for chest pain and palpitations  Gastrointestinal: Negative  Negative for abdominal pain and vomiting  Genitourinary: Negative  Negative for dysuria and hematuria  Musculoskeletal: Negative for arthralgias and back pain  Skin: Negative for color change and rash  Neurological: Negative  Negative for seizures and syncope  Psychiatric/Behavioral: Negative           Objective:    /72 (BP Location: Left arm, Patient Position: Sitting, Cuff Size: Adult)   Pulse 89   Temp (!) 97 1 °F (36 2 °C) (Temporal)   Ht 4' 11\" (1 499 m)   Wt 63 2 kg (139 lb 6 4 oz)   SpO2 97%   BMI 28 16 kg/m²   Wt Readings from Last 3 Encounters:   06/20/23 63 2 " kg (139 lb 6 4 oz)   05/23/23 66 kg (145 lb 8 oz)   02/03/23 64 9 kg (143 lb)     BP Readings from Last 3 Encounters:   06/20/23 124/72   05/23/23 124/60   05/13/23 130/70       Lab Results   Component Value Date    WBC 7 70 01/31/2022    HGB 13 0 01/31/2022    HCT 37 0 01/31/2022     01/31/2022    TRIG 118 08/17/2018    HDL 44 08/17/2018    ALT 22 04/12/2022    AST 14 04/12/2022    K 4 7 04/12/2022     04/12/2022    CREATININE 0 75 04/12/2022    BUN 12 04/12/2022    CO2 28 04/12/2022    TSH 1 96 09/02/2017    GLUF 106 (H) 08/21/2019    HGBA1C 11 0 (H) 04/18/2023       Physical Exam  Vitals and nursing note reviewed  Constitutional:       Appearance: Normal appearance  She is well-developed  HENT:      Head: Normocephalic and atraumatic  Nose: Congestion and rhinorrhea present  Mouth/Throat:      Mouth: Mucous membranes are moist    Eyes:      Pupils: Pupils are equal, round, and reactive to light  Cardiovascular:      Rate and Rhythm: Normal rate and regular rhythm  Heart sounds: No murmur heard  Pulmonary:      Effort: Pulmonary effort is normal       Breath sounds: Normal breath sounds  Musculoskeletal:      Cervical back: Normal range of motion and neck supple  Skin:     General: Skin is warm  Capillary Refill: Capillary refill takes less than 2 seconds  Neurological:      Mental Status: She is alert and oriented to person, place, and time  Cranial Nerves: No cranial nerve deficit  Psychiatric:         Mood and Affect: Mood normal          Thought Content: Thought content normal                        Assessment/Plan:   Gustavo Lowe was seen today for follow-up      Diagnoses and all orders for this visit:    Essential hypertension    Dizziness  Comments:  resolved    Blocked ear, left  -     fluticasone (FLONASE) 50 mcg/act nasal spray; 2 sprays into each nostril daily    ETD (Eustachian tube dysfunction), bilateral  -     fluticasone (FLONASE) 50 mcg/act nasal spray; 2 sprays into each nostril daily    Type 2 diabetes mellitus with polyneuropathy (HCC)    Age-related osteoporosis without current pathological fracture    Syncope, unspecified syncope type    Seasonal allergic rhinitis due to pollen      Return in about 3 months (around 9/20/2023) for Recheck  There are no Patient Instructions on file for this visit

## 2023-06-29 ENCOUNTER — VBI (OUTPATIENT)
Dept: ADMINISTRATIVE | Facility: OTHER | Age: 68
End: 2023-06-29

## 2023-07-07 ENCOUNTER — VBI (OUTPATIENT)
Dept: ADMINISTRATIVE | Facility: OTHER | Age: 68
End: 2023-07-07

## 2023-07-18 DIAGNOSIS — H93.8X2 BLOCKED EAR, LEFT: ICD-10-CM

## 2023-07-18 DIAGNOSIS — H69.83 ETD (EUSTACHIAN TUBE DYSFUNCTION), BILATERAL: ICD-10-CM

## 2023-07-18 RX ORDER — FLUTICASONE PROPIONATE 50 MCG
SPRAY, SUSPENSION (ML) NASAL
Qty: 48 ML | Refills: 2 | Status: SHIPPED | OUTPATIENT
Start: 2023-07-18

## 2023-08-11 ENCOUNTER — ANNUAL EXAM (OUTPATIENT)
Dept: GYNECOLOGY | Facility: CLINIC | Age: 68
End: 2023-08-11
Payer: COMMERCIAL

## 2023-08-11 VITALS
BODY MASS INDEX: 28.35 KG/M2 | WEIGHT: 140.6 LBS | SYSTOLIC BLOOD PRESSURE: 140 MMHG | DIASTOLIC BLOOD PRESSURE: 78 MMHG | HEIGHT: 59 IN | HEART RATE: 100 BPM

## 2023-08-11 DIAGNOSIS — Z12.31 ENCOUNTER FOR SCREENING MAMMOGRAM FOR MALIGNANT NEOPLASM OF BREAST: ICD-10-CM

## 2023-08-11 DIAGNOSIS — M81.0 AGE-RELATED OSTEOPOROSIS WITHOUT CURRENT PATHOLOGICAL FRACTURE: ICD-10-CM

## 2023-08-11 DIAGNOSIS — Z01.419 ENCOUNTER FOR GYNECOLOGICAL EXAMINATION WITHOUT ABNORMAL FINDING: Primary | ICD-10-CM

## 2023-08-11 PROCEDURE — G0101 CA SCREEN;PELVIC/BREAST EXAM: HCPCS | Performed by: OBSTETRICS & GYNECOLOGY

## 2023-08-11 PROCEDURE — G0145 SCR C/V CYTO,THINLAYER,RESCR: HCPCS | Performed by: OBSTETRICS & GYNECOLOGY

## 2023-08-11 RX ORDER — IBANDRONATE SODIUM 150 MG/1
150 TABLET, FILM COATED ORAL
Qty: 3 TABLET | Refills: 3 | Status: SHIPPED | OUTPATIENT
Start: 2023-08-11

## 2023-08-11 NOTE — PROGRESS NOTES
Assessment/Plan:         Diagnoses and all orders for this visit:    Encounter for gynecological examination without abnormal finding    Encounter for screening mammogram for malignant neoplasm of breast  -     Mammo screening bilateral w 3d & cad; Future    Age-related osteoporosis without current pathological fracture  -     ibandronate (BONIVA) 150 MG tablet; Take 1 tablet (150 mg total) by mouth every 30 (thirty) days        Subjective:      Patient ID: Aretha Clayton is a 79 y.o. female. HPI patient presents for annual examination. She offers no complaints. She was previously on medications for osteoporosis but wanted to switch to Prolia but due to cost reasons she would like to switch back to oral medications. She denies any vaginal irritation, burning, discharge or bleeding. Denies any dysuria, hematuria or urgency. She has mild tolerable infrequent stress urinary incontinence. No GI complaints. Cologuard 2020. Negative. Kit has been ordered by her PCP for repeat Cologuard testing. DEXA scan April 2022. Osteoporosis    The following portions of the patient's history were reviewed and updated as appropriate:   She  has a past medical history of Absent pulse, Depression, Diabetes ([address]), HLD (hyperlipidemia), Neuropathy, Osteopenia, Prominence of large joints, and Weight loss.   She   Patient Active Problem List    Diagnosis Date Noted   • Embolism and thrombosis of arteries of the lower extremities ([address]) 06/13/2023   • Stage 3a chronic kidney disease ([address]) 06/13/2023   • Syncope 03/05/2022   • Type 2 diabetes mellitus with polyneuropathy ([address]) 02/16/2022   • Head contusion 11/24/2021   • Decreased pedal pulses 11/24/2021   • Essential hypertension 11/24/2021   • Elevated platelet count 74/48/6674   • Low vitamin D level 11/24/2021   • Osteoporosis without current pathological fracture 11/24/2021   • Immunization due 11/24/2021   • HIV screening declined 05/06/2021   • Leukocytosis 08/20/2019   • Acute bronchitis 08/19/2019   • Diaphoresis 08/19/2019   • HLD (hyperlipidemia) 08/19/2019   • Tobacco abuse 08/19/2019   • Elevated blood pressure reading 08/19/2019   • Osteopenia    • Uncontrolled insulin-dependent diabetes mellitus with hyperglycemia    • Depression      She  has a past surgical history that includes Eye surgery (2018) and Trigger finger release (Left, 03/12/2021). Her family history includes Alzheimer's disease in her mother; Diabetes in her sister; Heart attack in her father; No Known Problems in her daughter, daughter, maternal grandmother, paternal grandmother, sister, sister, sister, sister, sister, and sister. She  reports that she has been smoking cigarettes. She has a 10.00 pack-year smoking history. She has never used smokeless tobacco. She reports that she does not currently use alcohol. She reports that she does not use drugs. Current Outpatient Medications   Medication Sig Dispense Refill   • acetaminophen (TYLENOL) 500 mg tablet every 8 (eight) hours     • amlodipine-olmesartan (BRIANNA) 10-40 MG Take 1 tablet by mouth every evening     • BD Pen Needle Veda 2nd Gen 32G X 4 MM MISC 1 STICK BY MISCELLANEOUS ROUTE 4 TIMES DAILY.      • Continuous Blood Gluc Sensor (FreeStyle [employer] Sensor System) MISC FreeStyle Maddy 2 Sensor kit     • ergocalciferol (VITAMIN D2) 50,000 units Take 50,000 Units by mouth     • ezetimibe (ZETIA) 10 mg tablet Take 10 mg by mouth daily     • fluticasone (FLONASE) 50 mcg/act nasal spray SPRAY 2 SPRAYS INTO EACH NOSTRIL EVERY DAY 48 mL 2   • gabapentin (NEURONTIN) 300 mg capsule Take 1 capsule (300 mg total) by mouth 3 (three) times a day 90 capsule 0   • ibandronate (BONIVA) 150 MG tablet Take 1 tablet (150 mg total) by mouth every 30 (thirty) days 3 tablet 3   • insulin aspart (NOVOLOG FLEXPEN) 100 Units/mL injection pen Inject 10 Units under the skin 3 (three) times a day with meals for 30 days 3 pen 0   • loratadine (CLARITIN) 10 mg tablet Take 1 tablet (10 mg total) by mouth daily 30 tablet 0   • metFORMIN (GLUCOPHAGE-XR) 750 mg 24 hr tablet Take 750 mg by mouth 2 (two) times a day     • omega-3-acid ethyl esters (LOVAZA) 1 g capsule Take 2 g by mouth 2 (two) times a day       • omeprazole (PriLOSEC) 20 mg delayed release capsule TAKE 1 CAPSULE BY MOUTH EVERY DAY 90 capsule 1   • OYSCO 500 + D 500-5 MG-MCG TABS Take 1 tablet by mouth 2 (two) times a day with meals     • rosuvastatin (CRESTOR) 40 MG tablet TAKE 1 TABLET BY MOUTH EVERY DAY AT NIGHT     • Trulicity 4.5 YC/2.4PF injection INJECT 4.5 MG SUBCUTANEOUSLY EVERY 7 DAYS     • UltiCare Insulin Syringe 31G X 5/16" 0.3 ML MISC      • amlodipine-olmesartan (BRIANNA) 5-40 MG amlodipine 5 mg-olmesartan 40 mg tablet   TAKE 1 TABLET BY MOUTH EVERYDAY AT BEDTIME (Patient not taking: Reported on 6/20/2023)     • Continuous Blood Gluc Sensor (Enlite Glucose Sensor) MISC Use 1 each every 14 (fourteen) days (Patient not taking: Reported on 8/11/2023)     • Difluprednate (Durezol) 0.05 % EMUL Durezol 0.05 % eye drops (Patient not taking: Reported on 5/23/2023)     • Insulin Disposable Pump (Omnipod 5 G6 Pod, Gen 5,) MISC Omnipod Dash 5 Pack Insulin Pod subcutaneous cartridge   USE AS DIRECTED DAILY (Patient not taking: Reported on 5/23/2023)     • metFORMIN (GLUCOPHAGE-XR) 750 mg 24 hr tablet Take 1 tablet po BID (Patient not taking: Reported on 8/66/7416)     • Trulicity 1.5 YS/4.4HI SOPN  (Patient not taking: Reported on 6/20/2023)       No current facility-administered medications for this visit. Current Outpatient Medications on File Prior to Visit   Medication Sig   • acetaminophen (TYLENOL) 500 mg tablet every 8 (eight) hours   • amlodipine-olmesartan (BRIANNA) 10-40 MG Take 1 tablet by mouth every evening   • BD Pen Needle Veda 2nd Gen 32G X 4 MM MISC 1 STICK BY MISCELLANEOUS ROUTE 4 TIMES DAILY.    • Continuous Blood Gluc Sensor ([employer] System) MISC FreeStyle Maddy 2 Sensor kit   • ergocalciferol (VITAMIN D2) 50,000 units Take 50,000 Units by mouth   • ezetimibe (ZETIA) 10 mg tablet Take 10 mg by mouth daily   • fluticasone (FLONASE) 50 mcg/act nasal spray SPRAY 2 SPRAYS INTO EACH NOSTRIL EVERY DAY   • gabapentin (NEURONTIN) 300 mg capsule Take 1 capsule (300 mg total) by mouth 3 (three) times a day   • insulin aspart (NOVOLOG FLEXPEN) 100 Units/mL injection pen Inject 10 Units under the skin 3 (three) times a day with meals for 30 days   • loratadine (CLARITIN) 10 mg tablet Take 1 tablet (10 mg total) by mouth daily   • metFORMIN (GLUCOPHAGE-XR) 750 mg 24 hr tablet Take 750 mg by mouth 2 (two) times a day   • omega-3-acid ethyl esters (LOVAZA) 1 g capsule Take 2 g by mouth 2 (two) times a day     • omeprazole (PriLOSEC) 20 mg delayed release capsule TAKE 1 CAPSULE BY MOUTH EVERY DAY   • OYSCO 500 + D 500-5 MG-MCG TABS Take 1 tablet by mouth 2 (two) times a day with meals   • rosuvastatin (CRESTOR) 40 MG tablet TAKE 1 TABLET BY MOUTH EVERY DAY AT NIGHT   • Trulicity 4.5 RP/2.8QX injection INJECT 4.5 MG SUBCUTANEOUSLY EVERY 7 DAYS   • UltiCare Insulin Syringe 31G X 5/16" 0.3 ML MISC    • amlodipine-olmesartan (BRIANNA) 5-40 MG amlodipine 5 mg-olmesartan 40 mg tablet   TAKE 1 TABLET BY MOUTH EVERYDAY AT BEDTIME (Patient not taking: Reported on 6/20/2023)   • Continuous Blood Gluc Sensor (Enlite Glucose Sensor) MISC Use 1 each every 14 (fourteen) days (Patient not taking: Reported on 8/11/2023)   • Difluprednate (Durezol) 0.05 % EMUL Durezol 0.05 % eye drops (Patient not taking: Reported on 5/23/2023)   • Insulin Disposable Pump (Omnipod 5 G6 Pod, Gen 5,) MISC Omnipod Dash 5 Pack Insulin Pod subcutaneous cartridge   USE AS DIRECTED DAILY (Patient not taking: Reported on 5/23/2023)   • metFORMIN (GLUCOPHAGE-XR) 750 mg 24 hr tablet Take 1 tablet po BID (Patient not taking: Reported on 3/73/1235)   • Trulicity 1.5 OA/2.4IH SOPN  (Patient not taking: Reported on 6/20/2023)     No current facility-administered medications on file prior to visit. She is allergic to oxycodone. .    Review of Systems   Constitutional: Negative. HENT: Negative for sore throat and trouble swallowing. Gastrointestinal: Negative. Genitourinary: Negative. Objective:      /78   Pulse 100   Ht 4' 11" (1.499 m)   Wt 63.8 kg (140 lb 9.6 oz)   BMI 28.40 kg/m²          Physical Exam  Vitals reviewed. Cardiovascular:      Rate and Rhythm: Normal rate and regular rhythm. Pulses: Normal pulses. Heart sounds: Normal heart sounds. No murmur heard. Pulmonary:      Effort: Pulmonary effort is normal. No respiratory distress. Breath sounds: Normal breath sounds. Chest:   Breasts:     Right: No swelling, bleeding, inverted nipple, mass, nipple discharge, skin change or tenderness. Left: No swelling, bleeding, inverted nipple, mass, nipple discharge, skin change or tenderness. Abdominal:      General: There is no distension. Palpations: Abdomen is soft. There is no mass. Tenderness: There is no abdominal tenderness. There is no guarding or rebound. Hernia: No hernia is present. There is no hernia in the left inguinal area or right inguinal area. Genitourinary:     General: Normal vulva. Labia:         Right: No rash, tenderness or lesion. Left: No rash, tenderness or lesion. Vagina: Normal.      Cervix: Normal.      Uterus: Normal.       Adnexa:         Right: No mass, tenderness or fullness. Left: No mass, tenderness or fullness. Musculoskeletal:      Cervical back: Normal range of motion and neck supple. No tenderness. Lymphadenopathy:      Cervical: No cervical adenopathy. Upper Body:      Right upper body: No supraclavicular, axillary or pectoral adenopathy. Left upper body: No supraclavicular, axillary or pectoral adenopathy. Lower Body: No right inguinal adenopathy. No left inguinal adenopathy. Neurological:      Mental Status: She is alert.

## 2023-08-17 ENCOUNTER — OFFICE VISIT (OUTPATIENT)
Dept: FAMILY MEDICINE CLINIC | Facility: CLINIC | Age: 68
End: 2023-08-17
Payer: COMMERCIAL

## 2023-08-17 VITALS
TEMPERATURE: 97.8 F | DIASTOLIC BLOOD PRESSURE: 72 MMHG | SYSTOLIC BLOOD PRESSURE: 150 MMHG | BODY MASS INDEX: 27.9 KG/M2 | HEIGHT: 59 IN | OXYGEN SATURATION: 98 % | WEIGHT: 138.4 LBS | HEART RATE: 86 BPM

## 2023-08-17 DIAGNOSIS — I10 ESSENTIAL HYPERTENSION: ICD-10-CM
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
## 2023-08-17 DIAGNOSIS — R07.81 RIB PAIN: Primary | ICD-10-CM

## 2023-08-17 DIAGNOSIS — E11.65 TYPE 2 DIABETES MELLITUS WITH HYPERGLYCEMIA, WITH LONG-TERM CURRENT USE OF INSULIN (HCC): ICD-10-CM

## 2023-08-17 DIAGNOSIS — Z79.4 TYPE 2 DIABETES MELLITUS WITH HYPERGLYCEMIA, WITH LONG-TERM CURRENT USE OF INSULIN (HCC): ICD-10-CM

## 2023-08-17 LAB
LAB AP GYN PRIMARY INTERPRETATION: NORMAL
Lab: NORMAL

## 2023-08-17 PROCEDURE — 99214 OFFICE O/P EST MOD 30 MIN: CPT | Performed by: FAMILY MEDICINE

## 2023-08-17 NOTE — PROGRESS NOTES
Subjective:    HPI  Naheed Gamino is a 79 y.o. female here today for:  Chief Complaint   Patient presents with   • Fall     L side pain under L breast after fall a few days ago. [place] ---Above per clinical staff & reviewed. ---  HPI:  70yof here after having a fall about 4 days  Having pain under breast  States she stood up fast and might have passed out, noone was home   Has been fine since incident but having lower left rib pain  Discussed over the counter medications, heat/ice  Will get Xray  Good lung exam    FINDINGS:     Cardiomediastinal silhouette appears unremarkable.     Lungs are clear. No pleural effusions.     There is no pneumothorax.     No rib fractures are identified.     IMPRESSION:     No active cardiopulmonary disease.     No evidence of rib fractures.           The following portions of the patient's history were reviewed and updated as appropriate: allergies, current medications, past family history, past medical history, past social history, past surgical history and problem list.    Past Medical History:   Diagnosis Date   • Absent pulse     DP pulse B/L   • Depression    • Diabetes ([address])    • HLD (hyperlipidemia)    • Neuropathy    • Osteopenia    • Prominence of large joints     (L) SC   • Weight loss        Past Surgical History:   Procedure Laterality Date   • EYE SURGERY  2018   • TRIGGER FINGER RELEASE Left 03/12/2021       Social History     Socioeconomic History   • Marital status: /Civil Union     Spouse name: None   • Number of children: None   • Years of education: None   • Highest education level: None   Occupational History   • Occupation:  [employer]   Tobacco Use   • Smoking status: Every Day     Packs/day: 0.50     Years: 20.00     Total pack years: 10.00     Types: Cigarettes   • Smokeless tobacco: Never   Vaping Use   • Vaping Use: Never used   Substance and Sexual Activity   • Alcohol use: Not Currently   • Drug use: Never   • Sexual activity: Not Currently Birth control/protection: Post-menopausal   Other Topics Concern   • None   Social History Narrative   • None     Social Determinants of Health     Financial Resource Strain: Low Risk  (11/29/2022)    Overall Financial Resource Strain (CARDIA)    • Difficulty of Paying Living Expenses: Not hard at all   Food Insecurity: Not on file   Transportation Needs: No Transportation Needs (11/29/2022)    PRAPARE - Transportation    • Lack of Transportation (Medical): No    • Lack of Transportation (Non-Medical): No   Physical Activity: Not on file   Stress: Not on file   Social Connections: Not on file   Intimate Partner Violence: Not on file   Housing Stability: Not on file       Current Outpatient Medications   Medication Sig Dispense Refill   • acetaminophen (TYLENOL) 500 mg tablet every 8 (eight) hours     • amlodipine-olmesartan (BRIANNA) 10-40 MG Take 1 tablet by mouth every evening     • BD Pen Needle Veda 2nd Gen 32G X 4 MM MISC 1 STICK BY MISCELLANEOUS ROUTE 4 TIMES DAILY.      • Continuous Blood Gluc Sensor (FreeStyle [employer] Sensor System) MISC FreeStyle Maddy 2 Sensor kit     • Difluprednate (Durezol) 0.05 % EMUL      • ergocalciferol (VITAMIN D2) 50,000 units Take 50,000 Units by mouth     • ezetimibe (ZETIA) 10 mg tablet Take 10 mg by mouth daily     • fluticasone (FLONASE) 50 mcg/act nasal spray SPRAY 2 SPRAYS INTO EACH NOSTRIL EVERY DAY 48 mL 2   • gabapentin (NEURONTIN) 300 mg capsule Take 1 capsule (300 mg total) by mouth 3 (three) times a day 90 capsule 0   • ibandronate (BONIVA) 150 MG tablet Take 1 tablet (150 mg total) by mouth every 30 (thirty) days 3 tablet 3   • loratadine (CLARITIN) 10 mg tablet Take 1 tablet (10 mg total) by mouth daily 30 tablet 0   • metFORMIN (GLUCOPHAGE-XR) 750 mg 24 hr tablet Take 750 mg by mouth 2 (two) times a day     • omega-3-acid ethyl esters (LOVAZA) 1 g capsule Take 2 g by mouth 2 (two) times a day       • omeprazole (PriLOSEC) 20 mg delayed release capsule TAKE 1 CAPSULE BY MOUTH EVERY DAY 90 capsule 1   • OYSCO 500 + D 500-5 MG-MCG TABS Take 1 tablet by mouth 2 (two) times a day with meals     • rosuvastatin (CRESTOR) 40 MG tablet TAKE 1 TABLET BY MOUTH EVERY DAY AT NIGHT     • Trulicity 4.5 DX/9.3UY injection INJECT 4.5 MG SUBCUTANEOUSLY EVERY 7 DAYS     • UltiCare Insulin Syringe 31G X 5/16" 0.3 ML MISC      • insulin aspart (NOVOLOG FLEXPEN) 100 Units/mL injection pen Inject 10 Units under the skin 3 (three) times a day with meals for 30 days 3 pen 0     No current facility-administered medications for this visit. Review of Systems   Constitutional: Negative. Negative for chills and fever. HENT: Negative. Negative for ear pain and sore throat. Eyes: Negative for pain and visual disturbance. Respiratory: Negative. Negative for cough and shortness of breath. Cardiovascular: Negative. Negative for chest pain and palpitations. Gastrointestinal: Negative. Negative for abdominal pain and vomiting. Genitourinary: Negative. Negative for dysuria and hematuria. Musculoskeletal: Negative for arthralgias and back pain. Skin: Negative for color change and rash. Neurological: Negative. Negative for seizures and syncope. Psychiatric/Behavioral: Negative.          Objective:    /72 (BP Location: Left arm, Patient Position: Sitting, Cuff Size: Standard)   Pulse 86   Temp 97.8 °F (36.6 °C) (Temporal)   Ht 4' 11" (1.499 m)   Wt 62.8 kg (138 lb 6.4 oz)   SpO2 98%   BMI 27.95 kg/m²   Wt Readings from Last 3 Encounters:   08/17/23 62.8 kg (138 lb 6.4 oz)   08/11/23 63.8 kg (140 lb 9.6 oz)   06/20/23 63.2 kg (139 lb 6.4 oz)     BP Readings from Last 3 Encounters:   08/17/23 150/72   08/11/23 140/78   06/20/23 124/72       Lab Results   Component Value Date    WBC 7.70 01/31/2022    HGB 13.0 01/31/2022    HCT 37.0 01/31/2022     01/31/2022    TRIG 118 08/17/2018    HDL 44 08/17/2018    ALT 22 04/12/2022    AST 14 04/12/2022    K 4.7 04/12/2022    CL 108 04/12/2022    CREATININE 0.75 04/12/2022    BUN 12 04/12/2022    CO2 28 04/12/2022    TSH 1.96 09/02/2017    GLUF 106 (H) 08/21/2019    HGBA1C 11.0 (H) 04/18/2023       Physical Exam  Vitals and nursing note reviewed. Constitutional:       Appearance: Normal appearance. She is well-developed. HENT:      Head: Normocephalic and atraumatic. Eyes:      Pupils: Pupils are equal, round, and reactive to light. Cardiovascular:      Rate and Rhythm: Normal rate and regular rhythm. Heart sounds: No murmur heard. Pulmonary:      Effort: Pulmonary effort is normal.      Breath sounds: Normal breath sounds. Musculoskeletal:         General: Tenderness present. Cervical back: Normal range of motion and neck supple. Skin:     General: Skin is warm. Capillary Refill: Capillary refill takes less than 2 seconds. Neurological:      Mental Status: She is alert and oriented to person, place, and time. Cranial Nerves: No cranial nerve deficit. Psychiatric:         Mood and Affect: Mood normal.         Thought Content: Thought content normal.                       Assessment/Plan:   Kate Gaytan was seen today for fall. Diagnoses and all orders for this visit:    Rib pain  -     XR ribs left w pa chest min 3 views; Future    Fall, initial encounter  -     XR ribs left w pa chest min 3 views; Future    Type 2 diabetes mellitus with hyperglycemia, with long-term current use of insulin ([address])    Essential hypertension      Return if symptoms worsen or fail to improve.   Patient Instructions   Please get xray today  Use tylenol as needed for pain  Use heat/ice for pain

## 2023-08-18 ENCOUNTER — APPOINTMENT (OUTPATIENT)
Dept: RADIOLOGY | Facility: MEDICAL CENTER | Age: 68
End: 2023-08-18
Payer: COMMERCIAL

## 2023-08-18 DIAGNOSIS — R07.81 RIB PAIN: ICD-10-CM
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
## 2023-08-18 PROCEDURE — 71101 X-RAY EXAM UNILAT RIBS/CHEST: CPT

## 2023-09-14 ENCOUNTER — RA CDI HCC (OUTPATIENT)
Dept: OTHER | Facility: HOSPITAL | Age: 68
End: 2023-09-14

## 2023-09-14 NOTE — PROGRESS NOTES
[facility] coding opportunities          Chart Reviewed number of suggestions sent to Provider: 3   e11.22  E11.51  K11.3698    Patients Insurance     Medicare Insurance: [employer]

## 2023-09-15 ENCOUNTER — VBI (OUTPATIENT)
Dept: ADMINISTRATIVE | Facility: OTHER | Age: 68
End: 2023-09-15

## 2023-11-03 ENCOUNTER — OFFICE VISIT (OUTPATIENT)
Dept: URGENT CARE | Age: 68
End: 2023-11-03
Payer: COMMERCIAL

## 2023-11-03 VITALS
OXYGEN SATURATION: 98 % | TEMPERATURE: 98.4 F | HEIGHT: 59 IN | SYSTOLIC BLOOD PRESSURE: 148 MMHG | HEART RATE: 88 BPM | DIASTOLIC BLOOD PRESSURE: 76 MMHG | BODY MASS INDEX: 27.42 KG/M2 | WEIGHT: 136 LBS | RESPIRATION RATE: 18 BRPM

## 2023-11-03 DIAGNOSIS — K21.9 GASTROESOPHAGEAL REFLUX DISEASE WITHOUT ESOPHAGITIS: Primary | ICD-10-CM

## 2023-11-03 PROCEDURE — 99212 OFFICE O/P EST SF 10 MIN: CPT

## 2023-11-03 NOTE — PROGRESS NOTES
[facility]        NAME: Flor Larose is a 76 y.o. female  : 1955    MRN: [id number]  DATE: November 3, 2023  TIME: 5:00 PM    Assessment and Plan   Gastroesophageal reflux disease without esophagitis [K21.9]  1. Gastroesophageal reflux disease without esophagitis          Epigastric pain off and on for 3 days. Not taking prescribed Prilosec. Has not called PCP. Eating sauce and only had coffee this am . Discussed resuming GERD medications and f/u with PCP for further eval .     Patient Instructions       Follow up with PCP as needed    Chief Complaint     Chief Complaint   Patient presents with    Heartburn     Started w/ reflux/heartburn 3 days ago after eating sauce. Epigastric burning & vomiting 1-2/day. Denies CP or SOB. Drank coffee this am.         History of Present Illness       Epigastric pain off and on for 3 days. Not taking prescribed Prilosec. Has not called PCP. Eating sauce and only had coffee this am . Discussed resuming GERD medications and f/u with PCP for further eval .     Heartburn  She complains of abdominal pain. She reports no chest pain. Review of Systems   Review of Systems   Cardiovascular:  Negative for chest pain and palpitations. Gastrointestinal:  Positive for abdominal pain. All other systems reviewed and are negative. Current Medications       Current Outpatient Medications:     acetaminophen (TYLENOL) 500 mg tablet, every 8 (eight) hours, Disp: , Rfl:     amlodipine-olmesartan (BRIANNA) 10-40 MG, Take 1 tablet by mouth every evening, Disp: , Rfl:     BD Pen Needle Veda 2nd Gen 32G X 4 MM MISC, 1 STICK BY MISCELLANEOUS ROUTE 4 TIMES DAILY. , Disp: , Rfl:     Continuous Blood Gluc Sensor (FreeStyle [employer]) MISC, FreeStyle Maddy 2 Sensor kit, Disp: , Rfl:     Difluprednate (Durezol) 0.05 % EMUL, , Disp: , Rfl:     ergocalciferol (VITAMIN D2) 50,000 units, Take 50,000 Units by mouth, Disp: , Rfl:     ezetimibe (ZETIA) 10 mg tablet, Take 10 mg by mouth daily, Disp: , Rfl:     fluticasone (FLONASE) 50 mcg/act nasal spray, SPRAY 2 SPRAYS INTO EACH NOSTRIL EVERY DAY, Disp: 48 mL, Rfl: 2    gabapentin (NEURONTIN) 300 mg capsule, Take 1 capsule (300 mg total) by mouth 3 (three) times a day, Disp: 90 capsule, Rfl: 0    ibandronate (BONIVA) 150 MG tablet, Take 1 tablet (150 mg total) by mouth every 30 (thirty) days, Disp: 3 tablet, Rfl: 3    loratadine (CLARITIN) 10 mg tablet, Take 1 tablet (10 mg total) by mouth daily, Disp: 30 tablet, Rfl: 0    metFORMIN (GLUCOPHAGE-XR) 750 mg 24 hr tablet, Take 750 mg by mouth 2 (two) times a day, Disp: , Rfl:     omega-3-acid ethyl esters (LOVAZA) 1 g capsule, Take 2 g by mouth 2 (two) times a day  , Disp: , Rfl:     omeprazole (PriLOSEC) 20 mg delayed release capsule, TAKE 1 CAPSULE BY MOUTH EVERY DAY, Disp: 90 capsule, Rfl: 1    OYSCO 500 + D 500-5 MG-MCG TABS, Take 1 tablet by mouth 2 (two) times a day with meals, Disp: , Rfl:     rosuvastatin (CRESTOR) 40 MG tablet, TAKE 1 TABLET BY MOUTH EVERY DAY AT NIGHT, Disp: , Rfl:     Trulicity 4.5 UW/2.6KH injection, INJECT 4.5 MG SUBCUTANEOUSLY EVERY 7 DAYS, Disp: , Rfl:     UltiCare Insulin Syringe 31G X 5/16" 0.3 ML MISC, , Disp: , Rfl:     insulin aspart (NOVOLOG FLEXPEN) 100 Units/mL injection pen, Inject 10 Units under the skin 3 (three) times a day with meals for 30 days, Disp: 3 pen, Rfl: 0    Current Allergies     Allergies as of 11/03/2023 - Reviewed 11/03/2023   Allergen Reaction Noted    Oxycodone Other (See Comments) 01/13/2021            The following portions of the patient's history were reviewed and updated as appropriate: allergies, current medications, past family history, past medical history, past social history, past surgical history and problem list.     Past Medical History:   Diagnosis Date    Absent pulse     DP pulse B/L    Depression     Diabetes (HCC)     HLD (hyperlipidemia)     Neuropathy     Osteopenia     Prominence of large joints     (L) SC Weight loss        Past Surgical History:   Procedure Laterality Date    EYE SURGERY  2018    TRIGGER FINGER RELEASE Left 03/12/2021       Family History   Problem Relation Age of Onset    Alzheimer's disease Mother     Heart attack Father     Diabetes Sister     No Known Problems Daughter     No Known Problems Maternal Grandmother     No Known Problems Paternal Grandmother     No Known Problems Sister     No Known Problems Sister     No Known Problems Sister     No Known Problems Sister     No Known Problems Sister     No Known Problems Sister     No Known Problems Daughter          Medications have been verified. Objective   /76   Pulse 88   Temp 98.4 °F (36.9 °C)   Resp 18   Ht 4' 11" (1.499 m)   Wt 61.7 kg (136 lb)   SpO2 98%   BMI 27.47 kg/m²   No LMP recorded. Patient is postmenopausal.       Physical Exam     Physical Exam  Vitals reviewed. Constitutional:       Appearance: Normal appearance. Cardiovascular:      Rate and Rhythm: Normal rate and regular rhythm. Pulses: Normal pulses. Heart sounds: Normal heart sounds. Pulmonary:      Effort: Pulmonary effort is normal.      Breath sounds: Normal breath sounds. Abdominal:      General: Bowel sounds are normal.      Palpations: Abdomen is soft. Tenderness: There is no abdominal tenderness. There is no guarding. Musculoskeletal:         General: Normal range of motion. Skin:     General: Skin is warm and dry. Neurological:      Mental Status: She is alert.

## 2023-11-30 ENCOUNTER — VBI (OUTPATIENT)
Dept: ADMINISTRATIVE | Facility: OTHER | Age: 68
End: 2023-11-30

## 2023-12-14 ENCOUNTER — OFFICE VISIT (OUTPATIENT)
Dept: FAMILY MEDICINE CLINIC | Facility: CLINIC | Age: 68
End: 2023-12-14
Payer: COMMERCIAL

## 2023-12-14 VITALS
HEART RATE: 85 BPM | DIASTOLIC BLOOD PRESSURE: 68 MMHG | TEMPERATURE: 97.7 F | SYSTOLIC BLOOD PRESSURE: 136 MMHG | BODY MASS INDEX: 28.06 KG/M2 | OXYGEN SATURATION: 97 % | HEIGHT: 59 IN | WEIGHT: 139.2 LBS

## 2023-12-14 DIAGNOSIS — Z23 ENCOUNTER FOR IMMUNIZATION: ICD-10-CM

## 2023-12-14 DIAGNOSIS — N18.31 STAGE 3A CHRONIC KIDNEY DISEASE (HCC): ICD-10-CM

## 2023-12-14 DIAGNOSIS — J01.90 ACUTE NON-RECURRENT SINUSITIS, UNSPECIFIED LOCATION: ICD-10-CM

## 2023-12-14 DIAGNOSIS — Z72.0 TOBACCO ABUSE: ICD-10-CM

## 2023-12-14 DIAGNOSIS — Z00.00 MEDICARE ANNUAL WELLNESS VISIT, SUBSEQUENT: Primary | ICD-10-CM

## 2023-12-14 DIAGNOSIS — Z79.4 TYPE 2 DIABETES MELLITUS WITH HYPERGLYCEMIA, WITH LONG-TERM CURRENT USE OF INSULIN (HCC): ICD-10-CM

## 2023-12-14 DIAGNOSIS — I10 ESSENTIAL HYPERTENSION: ICD-10-CM

## 2023-12-14 DIAGNOSIS — E11.65 TYPE 2 DIABETES MELLITUS WITH HYPERGLYCEMIA, WITH LONG-TERM CURRENT USE OF INSULIN (HCC): ICD-10-CM

## 2023-12-14 DIAGNOSIS — Z12.11 SCREENING FOR COLORECTAL CANCER: ICD-10-CM

## 2023-12-14 DIAGNOSIS — Z12.12 SCREENING FOR COLORECTAL CANCER: ICD-10-CM

## 2023-12-14 DIAGNOSIS — R32 URINARY INCONTINENCE, UNSPECIFIED TYPE: ICD-10-CM

## 2023-12-14 LAB — SL AMB POCT HEMOGLOBIN AIC: 13.3 (ref ?–6.5)

## 2023-12-14 PROCEDURE — 83036 HEMOGLOBIN GLYCOSYLATED A1C: CPT | Performed by: FAMILY MEDICINE

## 2023-12-14 PROCEDURE — 90677 PCV20 VACCINE IM: CPT

## 2023-12-14 PROCEDURE — 99214 OFFICE O/P EST MOD 30 MIN: CPT | Performed by: FAMILY MEDICINE

## 2023-12-14 PROCEDURE — G0439 PPPS, SUBSEQ VISIT: HCPCS | Performed by: FAMILY MEDICINE

## 2023-12-14 PROCEDURE — G0009 ADMIN PNEUMOCOCCAL VACCINE: HCPCS

## 2023-12-14 RX ORDER — AZITHROMYCIN 250 MG/1
TABLET, FILM COATED ORAL DAILY
Qty: 6 TABLET | Refills: 0 | Status: SHIPPED | OUTPATIENT
Start: 2023-12-14 | End: 2023-12-19

## 2023-12-14 NOTE — PROGRESS NOTES
Assessment and Plan:     Problem List Items Addressed This Visit       Uncontrolled insulin-dependent diabetes mellitus with hyperglycemia    Relevant Orders    POCT hemoglobin A1c (Completed)    Ambulatory referral to clinical pharmacy    Comprehensive metabolic panel    CBC and Platelet    Albumin / creatinine urine ratio    Tobacco abuse    Relevant Orders    Ambulatory referral to clinical pharmacy    Comprehensive metabolic panel    CBC and Platelet    Albumin / creatinine urine ratio    Essential hypertension    Relevant Orders    Ambulatory referral to clinical pharmacy    Comprehensive metabolic panel    CBC and Platelet    Albumin / creatinine urine ratio    Stage 3a chronic kidney disease ([address])    Relevant Orders    Ambulatory referral to clinical pharmacy    Comprehensive metabolic panel    CBC and Platelet    Albumin / creatinine urine ratio     Other Visit Diagnoses       Medicare annual wellness visit, subsequent    -  Primary    Relevant Orders    Comprehensive metabolic panel    CBC and Platelet    Albumin / creatinine urine ratio    Screening for colorectal cancer        Relevant Orders    Ambulatory referral to Gastroenterology    Comprehensive metabolic panel    CBC and Platelet    Albumin / creatinine urine ratio    Encounter for immunization        Relevant Orders    Pneumococcal Conjugate Vaccine 20-valent (Pcv20) (Completed)    Comprehensive metabolic panel    CBC and Platelet    Albumin / creatinine urine ratio    Urinary incontinence, unspecified type        Relevant Orders    Comprehensive metabolic panel    CBC and Platelet    Albumin / creatinine urine ratio    Acute non-recurrent sinusitis, unspecified location        Relevant Medications    azithromycin (Zithromax) 250 mg tablet    Other Relevant Orders    Comprehensive metabolic panel    CBC and Platelet    Albumin / creatinine urine ratio          BMI Counseling: Body mass index is 28.11 kg/m².  The BMI is above normal. Nutrition recommendations include moderation in carbohydrate intake and increasing intake of lean protein. Exercise recommendations include moderate physical activity 150 minutes/week. No pharmacotherapy was ordered. Patient referred to PCP. Rationale for BMI follow-up plan is due to patient being overweight or obese. Preventive health issues were discussed with patient, and age appropriate screening tests were ordered as noted in patient's After Visit Summary. Personalized health advice and appropriate referrals for health education or preventive services given if needed, as noted in patient's After Visit Summary. History of Present Illness:     Patient presents for a Medicare Wellness Visit  36FLB here for respiratory symptoms and due for wellness  Pt has had sinus congestion and pain for quite awhile  States it seemed to improve but then worsened- discussed using flonase and will send over antibiotics  Recommended to quit smoking  A1C elevated- at endocrine in October- recommended she call them today or tomorrow as her sugars have been high- pt does have monitor and is on insulin  Health maintenance reviewed  Will get pneumo shot today  Discussed Kegel exercises with pt  Recommended close follow up    PHQ-2/9 Depression Screening    Little interest or pleasure in doing things: 0 - not at all  Feeling down, depressed, or hopeless: 0 - not at all  Trouble falling or staying asleep, or sleeping too much: 0 - not at all  Feeling tired or having little energy: 0 - not at all  Poor appetite or overeatin - not at all  Feeling bad about yourself - or that you are a failure or have let yourself or your family down: 0 - not at all  Trouble concentrating on things, such as reading the newspaper or watching television: 0 - not at all  Moving or speaking so slowly that other people could have noticed.  Or the opposite - being so fidgety or restless that you have been moving around a lot more than usual: 0 - not at all  Thoughts that you would be better off dead, or of hurting yourself in some way: 0 - not at all  PHQ-9 Score: 0   PHQ-9 Interpretation: No or Minimal depression              HPI   Patient Care Team:  Alyce Mejias MD as PCP - General (Family Medicine)  Jaiden Hampton MD (Endocrinology)  Bashir Mueller DO (Obstetrics and Gynecology)  Damien Vigil MD (Ophthalmology)     Review of Systems:     Review of Systems   Constitutional: Negative. Negative for chills and fever. HENT:  Positive for postnasal drip, rhinorrhea, sinus pressure and sinus pain. Negative for ear pain and sore throat. Eyes:  Negative for pain and visual disturbance. Respiratory:  Positive for cough. Negative for shortness of breath. Cardiovascular: Negative. Negative for chest pain and palpitations. Gastrointestinal: Negative. Negative for abdominal pain and vomiting. Genitourinary: Negative. Negative for dysuria and hematuria. Musculoskeletal:  Negative for arthralgias and back pain. Skin:  Negative for color change and rash. Neurological: Negative. Negative for seizures and syncope. Psychiatric/Behavioral: Negative.           Problem List:     Patient Active Problem List   Diagnosis    Osteopenia    Uncontrolled insulin-dependent diabetes mellitus with hyperglycemia    Depression    Acute bronchitis    Diaphoresis    HLD (hyperlipidemia)    Tobacco abuse    Elevated blood pressure reading    Leukocytosis    HIV screening declined    Head contusion    Decreased pedal pulses    Essential hypertension    Elevated platelet count    Low vitamin D level    Osteoporosis without current pathological fracture    Immunization due    Type 2 diabetes mellitus with polyneuropathy (HCC)    Syncope    Embolism and thrombosis of arteries of the lower extremities (HCC)    Stage 3a chronic kidney disease ([address])      Past Medical and Surgical History:     Past Medical History:   Diagnosis Date    Absent pulse     DP pulse B/L Depression     Diabetes ([address])     HLD (hyperlipidemia)     Neuropathy     Osteopenia     Prominence of large joints     (L) SC    Weight loss      Past Surgical History:   Procedure Laterality Date    EYE SURGERY  2018    TRIGGER FINGER RELEASE Left 03/12/2021      Family History:     Family History   Problem Relation Age of Onset    Alzheimer's disease Mother     Heart attack Father     Diabetes Sister     No Known Problems Daughter     No Known Problems Maternal Grandmother     No Known Problems Paternal Grandmother     No Known Problems Sister     No Known Problems Sister     No Known Problems Sister     No Known Problems Sister     No Known Problems Sister     No Known Problems Sister     No Known Problems Daughter       Social History:     Social History     Socioeconomic History    Marital status: /Civil Union     Spouse name: None    Number of children: None    Years of education: None    Highest education level: None   Occupational History    Occupation:  [employer]   Tobacco Use    Smoking status: Every Day     Current packs/day: 0.50     Average packs/day: 0.5 packs/day for 20.0 years (10.0 ttl pk-yrs)     Types: Cigarettes    Smokeless tobacco: Never   Vaping Use    Vaping status: Never Used   Substance and Sexual Activity    Alcohol use: Not Currently    Drug use: Never    Sexual activity: Not Currently     Birth control/protection: Post-menopausal   Other Topics Concern    None   Social History Narrative    None     Social Determinants of Health     Financial Resource Strain: Low Risk  (12/14/2023)    Overall Financial Resource Strain (CARDIA)     Difficulty of Paying Living Expenses: Not hard at all   Food Insecurity: Not on file   Transportation Needs: No Transportation Needs (12/14/2023)    PRAPARE - Transportation     Lack of Transportation (Medical): No     Lack of Transportation (Non-Medical):  No   Physical Activity: Not on file   Stress: Not on file   Social Connections: Not on file Intimate Partner Violence: Low Risk  (4/11/2021)    Received from [facility] Partner Violence   Housing Stability: Not on file      Medications and Allergies:     Current Outpatient Medications   Medication Sig Dispense Refill    acetaminophen (TYLENOL) 500 mg tablet every 8 (eight) hours      amlodipine-olmesartan (BRIANNA) 10-40 MG Take 1 tablet by mouth every evening      azithromycin (Zithromax) 250 mg tablet Take 2 tablets (500 mg total) by mouth daily for 1 day, THEN 1 tablet (250 mg total) daily for 4 days. 6 tablet 0    BD Pen Needle Veda 2nd Gen 32G X 4 MM MISC 1 STICK BY MISCELLANEOUS ROUTE 4 TIMES DAILY.       Continuous Blood Gluc Sensor ([employer] LiveSafe) MISC FreeStyle Maddy 2 Sensor kit      Difluprednate (Durezol) 0.05 % EMUL       ergocalciferol (VITAMIN D2) 50,000 units Take 50,000 Units by mouth      ezetimibe (ZETIA) 10 mg tablet Take 10 mg by mouth daily      fluticasone (FLONASE) 50 mcg/act nasal spray SPRAY 2 SPRAYS INTO EACH NOSTRIL EVERY DAY 48 mL 2    gabapentin (NEURONTIN) 300 mg capsule Take 1 capsule (300 mg total) by mouth 3 (three) times a day 90 capsule 0    insulin aspart (NOVOLOG FLEXPEN) 100 Units/mL injection pen Inject 10 Units under the skin 3 (three) times a day with meals for 30 days 3 pen 0    loratadine (CLARITIN) 10 mg tablet Take 1 tablet (10 mg total) by mouth daily 30 tablet 0    metFORMIN (GLUCOPHAGE-XR) 750 mg 24 hr tablet Take 750 mg by mouth 2 (two) times a day      omega-3-acid ethyl esters (LOVAZA) 1 g capsule Take 2 g by mouth 2 (two) times a day        omeprazole (PriLOSEC) 20 mg delayed release capsule TAKE 1 CAPSULE BY MOUTH EVERY DAY 90 capsule 1    OYSCO 500 + D 500-5 MG-MCG TABS Take 1 tablet by mouth 2 (two) times a day with meals      rosuvastatin (CRESTOR) 40 MG tablet TAKE 1 TABLET BY MOUTH EVERY DAY AT NIGHT      Trulicity 4.5 UC/1.8HY injection INJECT 4.5 MG SUBCUTANEOUSLY EVERY 7 DAYS      UltiCare Insulin Syringe 31G X 5/16" 0.3 ML MISC       ibandronate (BONIVA) 150 MG tablet Take 1 tablet (150 mg total) by mouth every 30 (thirty) days (Patient not taking: Reported on 12/14/2023) 3 tablet 3     No current facility-administered medications for this visit. Allergies   Allergen Reactions    Oxycodone Other (See Comments)     fatigue      Immunizations:     Immunization History   Administered Date(s) Administered    COVID-19 PFIZER VACCINE 0.3 ML IM 03/16/2021, 04/06/2021, 12/17/2021    INFLUENZA 09/23/2009, 10/09/2012, 10/01/2013, 10/10/2018, 10/05/2020    Influenza Injectable, MDCK, Preservative Free, Quadrivalent, 0.5 mL 10/10/2018    Influenza, seasonal, injectable 10/06/2015, 10/05/2017    Influenza, seasonal, injectable, preservative free 10/01/2014    Pneumococcal Conjugate Vaccine 20-valent (Pcv20), Polysace 12/14/2023    Pneumococcal Polysaccharide PPV23 01/13/2021    Tdap 12/19/2017, 10/16/2019      Health Maintenance:         Topic Date Due    Colorectal Cancer Screening  Never done    Breast Cancer Screening: Mammogram  03/09/2023    Hepatitis C Screening  Completed         Topic Date Due    Influenza Vaccine (1) 09/01/2023      Medicare Screening Tests and Risk Assessments:     Yue Cid is here for her Subsequent Wellness visit. Health Risk Assessment:   Patient rates overall health as good. Patient feels that their physical health rating is same. Patient is very satisfied with their life. Eyesight was rated as same. Hearing was rated as same. Patient feels that their emotional and mental health rating is same. Patients states they are never, rarely angry. Patient states they are sometimes unusually tired/fatigued. Pain experienced in the last 7 days has been none. Patient states that she has experienced no weight loss or gain in last 6 months. Depression Screening:   PHQ-9 Score: 0      Fall Risk Screening:    In the past year, patient has experienced: no history of falling in past year      Urinary Incontinence Screening:   Patient has leaked urine accidently in the last six months. Home Safety:  Patient does not have trouble with stairs inside or outside of their home. Patient has working smoke alarms and has working carbon monoxide detector. Home safety hazards include: none. Nutrition:   Current diet is Regular. Medications:   Patient is currently taking over-the-counter supplements. OTC medications include: see medication list. Patient is able to manage medications. Activities of Daily Living (ADLs)/Instrumental Activities of Daily Living (IADLs):   Walk and transfer into and out of bed and chair?: Yes  Dress and groom yourself?: Yes    Bathe or shower yourself?: Yes    Feed yourself? Yes  Do your laundry/housekeeping?: Yes  Manage your money, pay your bills and track your expenses?: Yes  Make your own meals?: Yes    Do your own shopping?: Yes    Previous Hospitalizations:   Any hospitalizations or ED visits within the last 12 months?: No      Advance Care Planning:   Living will: Yes    Durable POA for healthcare: Yes    Advanced directive: Yes      PREVENTIVE SCREENINGS      Cardiovascular Screening:    General: Screening Not Indicated and History Lipid Disorder      Diabetes Screening:     General: Screening Not Indicated and History Diabetes      Colorectal Cancer Screening:     General: Screening Current      Breast Cancer Screening:     General: Screening Current      Cervical Cancer Screening:    General: Screening Not Indicated      Osteoporosis Screening:    General: Screening Not Indicated and History Osteoporosis      Lung Cancer Screening:     General: Screening Not Indicated      Hepatitis C Screening:    General: Screening Current    Screening, Brief Intervention, and Referral to Treatment (SBIRT)    Screening  Typical number of drinks in a day: 0  Typical number of drinks in a week: 0  Interpretation: Low risk drinking behavior.     Single Item Drug Screening:  How often have you used an illegal drug (including marijuana) or a prescription medication for non-medical reasons in the past year? never    Single Item Drug Screen Score: 0  Interpretation: Negative screen for possible drug use disorder    No results found. Physical Exam:     /68 (BP Location: Left arm, Patient Position: Sitting, Cuff Size: Adult)   Pulse 85   Temp 97.7 °F (36.5 °C) (Temporal)   Ht 4' 11" (1.499 m)   Wt 63.1 kg (139 lb 3.2 oz)   SpO2 97%   BMI 28.11 kg/m²     Physical Exam  Vitals and nursing note reviewed. Constitutional:       General: She is not in acute distress. Appearance: She is well-developed. HENT:      Head: Normocephalic and atraumatic. Comments: Bilateral sinus tenderness     Right Ear: Tympanic membrane normal.      Left Ear: Tympanic membrane normal.      Nose: Nose normal.      Mouth/Throat:      Mouth: Mucous membranes are moist.   Eyes:      Conjunctiva/sclera: Conjunctivae normal.   Cardiovascular:      Rate and Rhythm: Normal rate and regular rhythm. Heart sounds: No murmur heard. Pulmonary:      Effort: Pulmonary effort is normal. No respiratory distress. Breath sounds: Normal breath sounds. Abdominal:      Palpations: Abdomen is soft. Tenderness: There is no abdominal tenderness. Musculoskeletal:         General: No swelling. Cervical back: Neck supple. Skin:     General: Skin is warm and dry. Capillary Refill: Capillary refill takes less than 2 seconds. Neurological:      Mental Status: She is alert.    Psychiatric:         Mood and Affect: Mood normal.          Rickie Douglas MD

## 2023-12-14 NOTE — PATIENT INSTRUCTIONS
Please get your labwork done fasting. Do not eat/drink for about 8-12 hours prior to getting the labwork done, however you may drink water or black coffee while you are fasting. Please use a [facility] lab if possible, as we receive the lab results more quickly. We will notify you of your labwork results even if they are normal.  Please contact us if you do not hear about your lab results after one week. Low Carb Recommendations:  Please follow a low carbohydrate, high protein diet. [employer] is a good armando/computer program to keep food logs. Your meals should be less than 30 grams of carbohydrates. Your snacks should be less than 15 grams of carbohydrates. You should drink at least 64 ounces of water daily. You should walk at least 30 minutes daily. Please call endocrine regarding your insulin dose and sugars. You are due for colon cancer screening. Follow up in 3 months. Take antibiotics as directed and use Flonase. Medicare Preventive Visit Patient Instructions  Thank you for completing your Welcome to Medicare Visit or Medicare Annual Wellness Visit today. Your next wellness visit will be due in one year (12/14/2024). The screening/preventive services that you may require over the next 5-10 years are detailed below. Some tests may not apply to you based off risk factors and/or age. Screening tests ordered at today's visit but not completed yet may show as past due. Also, please note that scanned in results may not display below.   Preventive Screenings:  Service Recommendations Previous Testing/Comments   Colorectal Cancer Screening  * Colonoscopy    * Fecal Occult Blood Test (FOBT)/Fecal Immunochemical Test (FIT)  * Fecal DNA/Cologuard Test  * Flexible Sigmoidoscopy Age: 43-73 years old   Colonoscopy: every 10 years (may be performed more frequently if at higher risk)  OR  FOBT/FIT: every 1 year  OR  Cologuard: every 3 years  OR  Sigmoidoscopy: every 5 years  Screening may be recommended earlier than age 39 if at higher risk for colorectal cancer. Also, an individualized decision between you and your healthcare provider will decide whether screening between the ages of 77-80 would be appropriate. Colonoscopy: 03/21/2019  FOBT/FIT: Not on file  Cologuard: Not on file  Sigmoidoscopy: Not on file    Screening Current     Breast Cancer Screening Age: 36 years old  Frequency: every 1-2 years  Not required if history of left and right mastectomy Mammogram: 03/09/2022    Screening Current   Cervical Cancer Screening Between the ages of 21-29, pap smear recommended once every 3 years. Between the ages of 32-69, can perform pap smear with HPV co-testing every 5 years. Recommendations may differ for women with a history of total hysterectomy, cervical cancer, or abnormal pap smears in past. Pap Smear: 08/11/2023    Screening Not Indicated   Hepatitis C Screening Once for adults born between 1945 and 1965  More frequently in patients at high risk for Hepatitis C Hep C Antibody: 03/22/2019    Screening Current   Diabetes Screening 1-2 times per year if you're at risk for diabetes or have pre-diabetes Fasting glucose: 106 mg/dL (8/21/2019)  A1C: 11.0 % (4/18/2023)  Screening Not Indicated  History Diabetes   Cholesterol Screening Once every 5 years if you don't have a lipid disorder. May order more often based on risk factors. Lipid panel: 11/30/2022    Screening Not Indicated  History Lipid Disorder     Other Preventive Screenings Covered by Medicare:  Abdominal Aortic Aneurysm (AAA) Screening: covered once if your at risk. You're considered to be at risk if you have a family history of AAA.   Lung Cancer Screening: covers low dose CT scan once per year if you meet all of the following conditions: (1) Age 48-67; (2) No signs or symptoms of lung cancer; (3) Current smoker or have quit smoking within the last 15 years; (4) You have a tobacco smoking history of at least 20 pack years (packs per day multiplied by number of years you smoked); (5) You get a written order from a healthcare provider. Glaucoma Screening: covered annually if you're considered high risk: (1) You have diabetes OR (2) Family history of glaucoma OR (3)  aged 48 and older OR (3)  American aged 72 and older  Osteoporosis Screening: covered every 2 years if you meet one of the following conditions: (1) You're estrogen deficient and at risk for osteoporosis based off medical history and other findings; (2) Have a vertebral abnormality; (3) On glucocorticoid therapy for more than 3 months; (4) Have primary hyperparathyroidism; (5) On osteoporosis medications and need to assess response to drug therapy. Last bone density test (DXA Scan): 04/02/2022. HIV Screening: covered annually if you're between the age of 14-79. Also covered annually if you are younger than 13 and older than 72 with risk factors for HIV infection. For pregnant patients, it is covered up to 3 times per pregnancy.     Immunizations:  Immunization Recommendations   Influenza Vaccine Annual influenza vaccination during flu season is recommended for all persons aged >= 6 months who do not have contraindications   Pneumococcal Vaccine   * Pneumococcal conjugate vaccine = PCV13 (Prevnar 13), PCV15 (Vaxneuvance), PCV20 (Prevnar 20)  * Pneumococcal polysaccharide vaccine = PPSV23 (Pneumovax) Adults 63-93 yo with certain risk factors or if 69+ yo  If never received any pneumonia vaccine: recommend Prevnar 20 (PCV20)  Give PCV20 if previously received 1 dose of PCV13 or PPSV23   Hepatitis B Vaccine 3 dose series if at intermediate or high risk (ex: diabetes, end stage renal disease, liver disease)   Respiratory syncytial virus (RSV) Vaccine - COVERED BY MEDICARE PART D  * RSVPreF3 (Arexvy) CDC recommends that adults 61years of age and older may receive a single dose of RSV vaccine using shared clinical decision-making (SCDM)   Tetanus (Td) Vaccine - COST NOT COVERED BY MEDICARE PART B Following completion of primary series, a booster dose should be given every 10 years to maintain immunity against tetanus. Td may also be given as tetanus wound prophylaxis. Tdap Vaccine - COST NOT COVERED BY MEDICARE PART B Recommended at least once for all adults. For pregnant patients, recommended with each pregnancy. Shingles Vaccine (Shingrix) - COST NOT COVERED BY MEDICARE PART B  2 shot series recommended in those 19 years and older who have or will have weakened immune systems or those 50 years and older     Health Maintenance Due:      Topic Date Due    Colorectal Cancer Screening  Never done    Breast Cancer Screening: Mammogram  03/09/2023    Hepatitis C Screening  Completed     Immunizations Due:      Topic Date Due    Pneumococcal Vaccine: 65+ Years (2 - PCV) 01/13/2022    Influenza Vaccine (1) 09/01/2023     Advance Directives   What are advance directives? Advance directives are legal documents that state your wishes and plans for medical care. These plans are made ahead of time in case you lose your ability to make decisions for yourself. Advance directives can apply to any medical decision, such as the treatments you want, and if you want to donate organs. What are the types of advance directives? There are many types of advance directives, and each state has rules about how to use them. You may choose a combination of any of the following:  Living will: This is a written record of the treatment you want. You can also choose which treatments you do not want, which to limit, and which to stop at a certain time. This includes surgery, medicine, IV fluid, and tube feedings. Durable power of  for healthcare [facility]): This is a written record that states who you want to make healthcare choices for you when you are unable to make them for yourself. This person, called a proxy, is usually a family member or a friend. You may choose more than 1 proxy.   Do not resuscitate (DNR) order:  A DNR order is used in case your heart stops beating or you stop breathing. It is a request not to have certain forms of treatment, such as CPR. A DNR order may be included in other types of advance directives. Medical directive: This covers the care that you want if you are in a coma, near death, or unable to make decisions for yourself. You can list the treatments you want for each condition. Treatment may include pain medicine, surgery, blood transfusions, dialysis, IV or tube feedings, and a ventilator (breathing machine). Values history: This document has questions about your views, beliefs, and how you feel and think about life. This information can help others choose the care that you would choose. Why are advance directives important? An advance directive helps you control your care. Although spoken wishes may be used, it is better to have your wishes written down. Spoken wishes can be misunderstood, or not followed. Treatments may be given even if you do not want them. An advance directive may make it easier for your family to make difficult choices about your care. Urinary Incontinence   Urinary incontinence (UI)  is when you lose control of your bladder. UI develops because your bladder cannot store or empty urine properly. The 3 most common types of UI are stress incontinence, urge incontinence, or both. Medicines:   May be given to help strengthen your bladder control. Report any side effects of medication to your healthcare provider. Do pelvic muscle exercises often:  Your pelvic muscles help you stop urinating. Squeeze these muscles tight for 5 seconds, then relax for 5 seconds. Gradually work up to squeezing for 10 seconds. Do 3 sets of 15 repetitions a day, or as directed. This will help strengthen your pelvic muscles and improve bladder control.   Train your bladder:  Go to the bathroom at set times, such as every 2 hours, even if you do not feel the urge to go. You can also try to hold your urine when you feel the urge to go. For example, hold your urine for 5 minutes when you feel the urge to go. As that becomes easier, hold your urine for 10 minutes. Self-care:   Keep a UI record. Write down how often you leak urine and how much you leak. Make a note of what you were doing when you leaked urine. Drink liquids as directed. You may need to limit the amount of liquid you drink to help control your urine leakage. Do not drink any liquid right before you go to bed. Limit or do not have drinks that contain caffeine or alcohol. Prevent constipation. Eat a variety of high-fiber foods. Good examples are high-fiber cereals, beans, vegetables, and whole-grain breads. Walking is the best way to trigger your intestines to have a bowel movement. Exercise regularly and maintain a healthy weight. Weight loss and exercise will decrease pressure on your bladder and help you control your leakage. Use a catheter as directed  to help empty your bladder. A catheter is a tiny, plastic tube that is put into your bladder to drain your urine. Go to behavior therapy as directed. Behavior therapy may be used to help you learn to control your urge to urinate. Cigarette Smoking and Your Health   Risks to your health if you smoke:  Nicotine and other chemicals found in tobacco damage every cell in your body. Even if you are a light smoker, you have an increased risk for cancer, heart disease, and lung disease. If you are pregnant or have diabetes, smoking increases your risk for complications. Benefits to your health if you stop smoking: You decrease respiratory symptoms such as coughing, wheezing, and shortness of breath. You reduce your risk for cancers of the lung, mouth, throat, kidney, bladder, pancreas, stomach, and cervix. If you already have cancer, you increase the benefits of chemotherapy.  You also reduce your risk for cancer returning or a second cancer from developing. You reduce your risk for heart disease, blood clots, heart attack, and stroke. You reduce your risk for lung infections, and diseases such as pneumonia, asthma, chronic bronchitis, and emphysema. Your circulation improves. More oxygen can be delivered to your body. If you have diabetes, you lower your risk for complications, such as kidney, artery, and eye diseases. You also lower your risk for nerve damage. Nerve damage can lead to amputations, poor vision, and blindness. You improve your body's ability to heal and to fight infections. For more information and support to stop smoking:   [email or web address]  Phone: 5- [phone]  Web Address: www.[employer]  Weight Management   Why it is important to manage your weight:  Being overweight increases your risk of health conditions such as heart disease, high blood pressure, type 2 diabetes, and certain types of cancer. It can also increase your risk for osteoarthritis, sleep apnea, and other respiratory problems. Aim for a slow, steady weight loss. Even a small amount of weight loss can lower your risk of health problems. How to lose weight safely:  A safe and healthy way to lose weight is to eat fewer calories and get regular exercise. You can lose up about 1 pound a week by decreasing the number of calories you eat by 500 calories each day. Healthy meal plan for weight management:  A healthy meal plan includes a variety of foods, contains fewer calories, and helps you stay healthy. A healthy meal plan includes the following:  Eat whole-grain foods more often. A healthy meal plan should contain fiber. Fiber is the part of grains, fruits, and vegetables that is not broken down by your body. Whole-grain foods are healthy and provide extra fiber in your diet. Some examples of whole-grain foods are whole-wheat breads and pastas, oatmeal, brown rice, and bulgur. Eat a variety of vegetables every day.   Include dark, leafy greens such as spinach, kale, senait greens, and mustard greens. Eat yellow and orange vegetables such as carrots, sweet potatoes, and winter squash. Eat a variety of fruits every day. Choose fresh or canned fruit (canned in its own juice or light syrup) instead of juice. Fruit juice has very little or no fiber. Eat low-fat dairy foods. Drink fat-free (skim) milk or 1% milk. Eat fat-free yogurt and low-fat cottage cheese. Try low-fat cheeses such as mozzarella and other reduced-fat cheeses. Choose meat and other protein foods that are low in fat. Choose beans or other legumes such as split peas or lentils. Choose fish, skinless poultry (chicken or turkey), or lean cuts of red meat (beef or pork). Before you cook meat or poultry, cut off any visible fat. Use less fat and oil. Try baking foods instead of frying them. Add less fat, such as margarine, sour cream, regular salad dressing and mayonnaise to foods. Eat fewer high-fat foods. Some examples of high-fat foods include french fries, doughnuts, ice cream, and cakes. Eat fewer sweets. Limit foods and drinks that are high in sugar. This includes candy, cookies, regular soda, and sweetened drinks. Exercise:  Exercise at least 30 minutes per day on most days of the week. Some examples of exercise include walking, biking, dancing, and swimming. You can also fit in more physical activity by taking the stairs instead of the elevator or parking farther away from stores. Ask your healthcare provider about the best exercise plan for you. © Copyright [employer] 2018 Information is for End User's use only and may not be sold, redistributed or otherwise used for commercial purposes. All illustrations and images included in CareNotes® are the copyrighted property of [employer] Inc. or [facility] Preventive Visit Patient Instructions  Thank you for completing your Welcome to Medicare Visit or Medicare Annual Wellness Visit today.  Your next wellness visit will be due in one year (12/14/2024). The screening/preventive services that you may require over the next 5-10 years are detailed below. Some tests may not apply to you based off risk factors and/or age. Screening tests ordered at today's visit but not completed yet may show as past due. Also, please note that scanned in results may not display below. Preventive Screenings:  Service Recommendations Previous Testing/Comments   Colorectal Cancer Screening  * Colonoscopy    * Fecal Occult Blood Test (FOBT)/Fecal Immunochemical Test (FIT)  * Fecal DNA/Cologuard Test  * Flexible Sigmoidoscopy Age: 43-73 years old   Colonoscopy: every 10 years (may be performed more frequently if at higher risk)  OR  FOBT/FIT: every 1 year  OR  Cologuard: every 3 years  OR  Sigmoidoscopy: every 5 years  Screening may be recommended earlier than age 39 if at higher risk for colorectal cancer. Also, an individualized decision between you and your healthcare provider will decide whether screening between the ages of 77-80 would be appropriate. Colonoscopy: 03/21/2019  FOBT/FIT: Not on file  Cologuard: Not on file  Sigmoidoscopy: Not on file    Screening Current     Breast Cancer Screening Age: 36 years old  Frequency: every 1-2 years  Not required if history of left and right mastectomy Mammogram: 03/09/2022    Screening Current   Cervical Cancer Screening Between the ages of 21-29, pap smear recommended once every 3 years. Between the ages of 32-69, can perform pap smear with HPV co-testing every 5 years.    Recommendations may differ for women with a history of total hysterectomy, cervical cancer, or abnormal pap smears in past. Pap Smear: 08/11/2023    Screening Not Indicated   Hepatitis C Screening Once for adults born between 1945 and 1965  More frequently in patients at high risk for Hepatitis C Hep C Antibody: 03/22/2019    Screening Current   Diabetes Screening 1-2 times per year if you're at risk for diabetes or have pre-diabetes Fasting glucose: 106 mg/dL (8/21/2019)  A1C: 11.0 % (4/18/2023)  Screening Not Indicated  History Diabetes   Cholesterol Screening Once every 5 years if you don't have a lipid disorder. May order more often based on risk factors. Lipid panel: 11/30/2022    Screening Not Indicated  History Lipid Disorder     Other Preventive Screenings Covered by Medicare:  Abdominal Aortic Aneurysm (AAA) Screening: covered once if your at risk. You're considered to be at risk if you have a family history of AAA. Lung Cancer Screening: covers low dose CT scan once per year if you meet all of the following conditions: (1) Age 48-67; (2) No signs or symptoms of lung cancer; (3) Current smoker or have quit smoking within the last 15 years; (4) You have a tobacco smoking history of at least 20 pack years (packs per day multiplied by number of years you smoked); (5) You get a written order from a healthcare provider. Glaucoma Screening: covered annually if you're considered high risk: (1) You have diabetes OR (2) Family history of glaucoma OR (3)  aged 48 and older OR (3)  American aged 72 and older  Osteoporosis Screening: covered every 2 years if you meet one of the following conditions: (1) You're estrogen deficient and at risk for osteoporosis based off medical history and other findings; (2) Have a vertebral abnormality; (3) On glucocorticoid therapy for more than 3 months; (4) Have primary hyperparathyroidism; (5) On osteoporosis medications and need to assess response to drug therapy. Last bone density test (DXA Scan): 04/02/2022. HIV Screening: covered annually if you're between the age of 14-79. Also covered annually if you are younger than 13 and older than 72 with risk factors for HIV infection. For pregnant patients, it is covered up to 3 times per pregnancy.     Immunizations:  Immunization Recommendations   Influenza Vaccine Annual influenza vaccination during flu season is recommended for all persons aged >= 6 months who do not have contraindications   Pneumococcal Vaccine   * Pneumococcal conjugate vaccine = PCV13 (Prevnar 13), PCV15 (Vaxneuvance), PCV20 (Prevnar 20)  * Pneumococcal polysaccharide vaccine = PPSV23 (Pneumovax) Adults 49-78 yo with certain risk factors or if 69+ yo  If never received any pneumonia vaccine: recommend Prevnar 20 (PCV20)  Give PCV20 if previously received 1 dose of PCV13 or PPSV23   Hepatitis B Vaccine 3 dose series if at intermediate or high risk (ex: diabetes, end stage renal disease, liver disease)   Respiratory syncytial virus (RSV) Vaccine - COVERED BY MEDICARE PART D  * RSVPreF3 (Arexvy) CDC recommends that adults 61years of age and older may receive a single dose of RSV vaccine using shared clinical decision-making (SCDM)   Tetanus (Td) Vaccine - COST NOT COVERED BY MEDICARE PART B Following completion of primary series, a booster dose should be given every 10 years to maintain immunity against tetanus. Td may also be given as tetanus wound prophylaxis. Tdap Vaccine - COST NOT COVERED BY MEDICARE PART B Recommended at least once for all adults. For pregnant patients, recommended with each pregnancy. Shingles Vaccine (Shingrix) - COST NOT COVERED BY MEDICARE PART B  2 shot series recommended in those 19 years and older who have or will have weakened immune systems or those 50 years and older     Health Maintenance Due:      Topic Date Due    Colorectal Cancer Screening  Never done    Breast Cancer Screening: Mammogram  03/09/2023    Hepatitis C Screening  Completed     Immunizations Due:      Topic Date Due    Pneumococcal Vaccine: 65+ Years (2 - PCV) 01/13/2022    Influenza Vaccine (1) 09/01/2023     Advance Directives   What are advance directives? Advance directives are legal documents that state your wishes and plans for medical care. These plans are made ahead of time in case you lose your ability to make decisions for yourself.  Advance directives can apply to any medical decision, such as the treatments you want, and if you want to donate organs. What are the types of advance directives? There are many types of advance directives, and each state has rules about how to use them. You may choose a combination of any of the following:  Living will: This is a written record of the treatment you want. You can also choose which treatments you do not want, which to limit, and which to stop at a certain time. This includes surgery, medicine, IV fluid, and tube feedings. Durable power of  for [facility]): This is a written record that states who you want to make healthcare choices for you when you are unable to make them for yourself. This person, called a proxy, is usually a family member or a friend. You may choose more than 1 proxy. Do not resuscitate (DNR) order:  A DNR order is used in case your heart stops beating or you stop breathing. It is a request not to have certain forms of treatment, such as CPR. A DNR order may be included in other types of advance directives. Medical directive: This covers the care that you want if you are in a coma, near death, or unable to make decisions for yourself. You can list the treatments you want for each condition. Treatment may include pain medicine, surgery, blood transfusions, dialysis, IV or tube feedings, and a ventilator (breathing machine). Values history: This document has questions about your views, beliefs, and how you feel and think about life. This information can help others choose the care that you would choose. Why are advance directives important? An advance directive helps you control your care. Although spoken wishes may be used, it is better to have your wishes written down. Spoken wishes can be misunderstood, or not followed. Treatments may be given even if you do not want them. An advance directive may make it easier for your family to make difficult choices about your care.    Urinary Incontinence   Urinary incontinence (UI)  is when you lose control of your bladder. UI develops because your bladder cannot store or empty urine properly. The 3 most common types of UI are stress incontinence, urge incontinence, or both. Medicines:   May be given to help strengthen your bladder control. Report any side effects of medication to your healthcare provider. Do pelvic muscle exercises often:  Your pelvic muscles help you stop urinating. Squeeze these muscles tight for 5 seconds, then relax for 5 seconds. Gradually work up to squeezing for 10 seconds. Do 3 sets of 15 repetitions a day, or as directed. This will help strengthen your pelvic muscles and improve bladder control. Train your bladder:  Go to the bathroom at set times, such as every 2 hours, even if you do not feel the urge to go. You can also try to hold your urine when you feel the urge to go. For example, hold your urine for 5 minutes when you feel the urge to go. As that becomes easier, hold your urine for 10 minutes. Self-care:   Keep a UI record. Write down how often you leak urine and how much you leak. Make a note of what you were doing when you leaked urine. Drink liquids as directed. You may need to limit the amount of liquid you drink to help control your urine leakage. Do not drink any liquid right before you go to bed. Limit or do not have drinks that contain caffeine or alcohol. Prevent constipation. Eat a variety of high-fiber foods. Good examples are high-fiber cereals, beans, vegetables, and whole-grain breads. Walking is the best way to trigger your intestines to have a bowel movement. Exercise regularly and maintain a healthy weight. Weight loss and exercise will decrease pressure on your bladder and help you control your leakage. Use a catheter as directed  to help empty your bladder. A catheter is a tiny, plastic tube that is put into your bladder to drain your urine. Go to behavior therapy as directed. Behavior therapy may be used to help you learn to control your urge to urinate. Cigarette Smoking and Your Health   Risks to your health if you smoke:  Nicotine and other chemicals found in tobacco damage every cell in your body. Even if you are a light smoker, you have an increased risk for cancer, heart disease, and lung disease. If you are pregnant or have diabetes, smoking increases your risk for complications. Benefits to your health if you stop smoking: You decrease respiratory symptoms such as coughing, wheezing, and shortness of breath. You reduce your risk for cancers of the lung, mouth, throat, kidney, bladder, pancreas, stomach, and cervix. If you already have cancer, you increase the benefits of chemotherapy. You also reduce your risk for cancer returning or a second cancer from developing. You reduce your risk for heart disease, blood clots, heart attack, and stroke. You reduce your risk for lung infections, and diseases such as pneumonia, asthma, chronic bronchitis, and emphysema. Your circulation improves. More oxygen can be delivered to your body. If you have diabetes, you lower your risk for complications, such as kidney, artery, and eye diseases. You also lower your risk for nerve damage. Nerve damage can lead to amputations, poor vision, and blindness. You improve your body's ability to heal and to fight infections. For more information and support to stop smoking:   [employer]  Phone: 0- 603 - [phone]  Web Address: www.[employer]  Weight Management   Why it is important to manage your weight:  Being overweight increases your risk of health conditions such as heart disease, high blood pressure, type 2 diabetes, and certain types of cancer. It can also increase your risk for osteoarthritis, sleep apnea, and other respiratory problems. Aim for a slow, steady weight loss. Even a small amount of weight loss can lower your risk of health problems.   How to lose weight safely:  A safe and healthy way to lose weight is to eat fewer calories and get regular exercise. You can lose up about 1 pound a week by decreasing the number of calories you eat by 500 calories each day. Healthy meal plan for weight management:  A healthy meal plan includes a variety of foods, contains fewer calories, and helps you stay healthy. A healthy meal plan includes the following:  Eat whole-grain foods more often. A healthy meal plan should contain fiber. Fiber is the part of grains, fruits, and vegetables that is not broken down by your body. Whole-grain foods are healthy and provide extra fiber in your diet. Some examples of whole-grain foods are whole-wheat breads and pastas, oatmeal, brown rice, and bulgur. Eat a variety of vegetables every day. Include dark, leafy greens such as spinach, kale, senait greens, and mustard greens. Eat yellow and orange vegetables such as carrots, sweet potatoes, and winter squash. Eat a variety of fruits every day. Choose fresh or canned fruit (canned in its own juice or light syrup) instead of juice. Fruit juice has very little or no fiber. Eat low-fat dairy foods. Drink fat-free (skim) milk or 1% milk. Eat fat-free yogurt and low-fat cottage cheese. Try low-fat cheeses such as mozzarella and other reduced-fat cheeses. Choose meat and other protein foods that are low in fat. Choose beans or other legumes such as split peas or lentils. Choose fish, skinless poultry (chicken or turkey), or lean cuts of red meat (beef or pork). Before you cook meat or poultry, cut off any visible fat. Use less fat and oil. Try baking foods instead of frying them. Add less fat, such as margarine, sour cream, regular salad dressing and mayonnaise to foods. Eat fewer high-fat foods. Some examples of high-fat foods include french fries, doughnuts, ice cream, and cakes. Eat fewer sweets. Limit foods and drinks that are high in sugar.  This includes candy, cookies, regular soda, and sweetened drinks. Exercise:  Exercise at least 30 minutes per day on most days of the week. Some examples of exercise include walking, biking, dancing, and swimming. You can also fit in more physical activity by taking the stairs instead of the elevator or parking farther away from stores. Ask your healthcare provider about the best exercise plan for you. © Copyright [employer] 2018 Information is for End User's use only and may not be sold, redistributed or otherwise used for commercial purposes. All illustrations and images included in CareNotes® are the copyrighted property of A.D.A.M., Inc. or [employer] de la diabetes tipo 2 en los adultos   CUIDADO AMBULATORIO:   La diabetes tipo 2 es melba enfermedad que afecta la forma en que el cuerpo utiliza la glucosa (azúcar). El cuerpo no puede producir suficiente insulina o es incapaz de usarla adecuadamente. Es importante controlar la diabetes para evitar el daño al corazón, los vasos sanguíneos y otros órganos. El control lo ayudará a sentirse hal y a disfrutar de candis actividades diarias. Candis médicos del equipo de cuidado de la diabetes pueden ayudarlo a hacer un plan para que el cuidado de la diabetes encaje en gibson horario. Gibson plan puede cambiar con el tiempo para adaptarse a candis necesidades y a las de [address]. Pídale a alguien que llame al [employer] de emergencias local (911 en los [place]) si:  No es posible despertarlo.     Tiene signos de cetoacidosis diabética:     confusión, fatiga    vómitos    latidos cardíacos rápidos    aliento con [employer] a frutas    sed extrema    sequedad en la boca y la piel    Tiene alguno de los siguientes signos de un ataque cardíaco:      Estrujamiento, presión o tensión en gibson pecho    Usted también podría presentar alguno de los siguientes:     Dedra Riling o dolor en gibson espalda, marimar, mandíbula, abdomen, o Chip Brittney    Falta de [employer] o vómitos    Desvanecimiento o sudor frío repentino    Usted tiene alguno de los siguientes signos de derrame cerebral:      Adormecimiento o caída de un lado de gibson nic    Debilidad en un brazo o melba pierna    Confusión o debilidad para hablar    Mareos o dolor de jarrell intenso, o pérdida de la visión. Llame a gibson médico o al equipo de cuidado de la diabetes si:  Tiene melba llaga o melba herida que no cicatrizan. Tiene un cambio en la cantidad de [place]. Salvatore niveles de azúcar en la preet son superiores a las metas fijadas. Usted a menudo tiene niveles de azúcar en la preet más bajos que salvatore metas fijadas. Gibson piel está enrojecida, seca, caliente al tacto o inflamada. Usted tiene problemas para sobrellevar gibson diabetes o se siente ansioso o deprimido. Tiene problemas para seguir alguna parte de gibson plan de atención, raya el plan de comidas. Usted tiene preguntas o inquietudes acerca de gibson condición o cuidado. Lo que usted necesita saber sobre los niveles altos de azúcar en la preet: El azúcar alto en la preet puede no causar ningún síntoma. Puede sentir más sed u orinar con más frecuencia de lo habitual. Con el tiempo, los niveles altos de azúcar en la preet pueden dañar salvatore nervios, vasos sanguíneos, tejidos y órganos. Lo siguiente aumenta los niveles de azúcar en la preet:  Comidas copiosas o grandes cantidades de carbohidratos de melba med vez    Menos actividad física    Estrés    Enfermedad    Melba dosis tiarra de medicamento para la diabetes o Anthony mawr, o melba dosis tardía    Lo que usted necesita saber sobre los niveles bajos de azúcar en la preet: Los síntomas incluyen sensación de temblores, Jusam, irritabilidad o confusión. Puedes prevenir los síntomas evitando que los niveles de azúcar en preet [place]. Trate los niveles bajos de azúcar en la preet inmediatamente:     Alondra 4 onzas de jugo o 1 tubo de gel de glucosa. Revise nuevamente gibson nivel de azúcar en la preet en 10 a 15 minutos.     Cuando el nivel regrese a la normalidad, coma un alimento o refrigerio para prevenir otro bajón. Lleve siempre consigo gel de glucosa, uvas pasas o caramelos duros para tratar los niveles bajos de azúcar en la preet. Gibson azúcar en la preet puede bajar demasiado si nadira un medicamento para la diabetes o la insulina y no consume suficientes alimentos. Si [place] insulina, verifique gibson nivel de azúcar en la preet antes de hacer ejercicio. Si gibson nivel de azúcar en la preet es inferior a 100 mg/dL, coma 4 galletas, 2 onzas de uvas pasas o percy 4 onzas de jugo. Compruebe gibson nivel cada 30 minutos si hace ejercicio rita más de 1 hora. Puede que necesite melba merienda rita o después de hacer ejercicio. Qué puede hacer para manejar salvatore niveles de azúcar en la preet:  Revise salvatore niveles de azúcar en la preet según las indicaciones y según sea necesario. Hay varios elementos disponibles que puede utilizar para comprobar salvatore [place]. Puede que tenga que comprobarlo probando melba gota de Bhavya Caba en un medidor de glucosa. En gibson lugar, es posible que le den un monitor continuo de glucosa (MCG). El dispositivo se lleva puesto en todo momento. El MCG revisa gibson nivel de azúcar en la preet cada 5 minutos. [employer] a un dispositivo electrónico, raya un teléfono inteligente. Un MCG puede utilizarse con o sin melba bomba de insulina. Usted y los médicos de gibson equipo de atención de la diabetes decidirán cuál es el mejor método para usted. El objetivo es lograr que los niveles de azúcar en preet antes de las comidas estén  entre 80 y 130 mg/dL y 2 horas después de comer  zachary inferiores a 180 mg/dL. Elija opciones de alimentos saludables. Consulte con gibson dietista para crear un plan de comidas que funcione para usted y salvatore horarios.  Un dietista puede ayudarlo para que aprenda cómo alimentarse hal con la cantidad [place] de carbohidratos (azúcar y los alimentos que contienen almidón) rita las comidas y meriendas. Algunos ejemplos de carbohidratos son el pan, los cereales, el arroz, la pasta, la fruta, los lácteos bajos en grasa y los Hernandez. Los carbohidratos pueden subir gibson azúcar en la preet si usted come demasiado a la vez. Coma alimentos altos en fibras, según las indicaciones. La fibra ayuda a mejorar los niveles de azúcar en la preet. La fibra también reduce el riesgo de padecer enfermedades cardíacas y otros problemas que puede causar la diabetes. Por ejemplo, los alimentos ricos en fibra verduras, pan integral y frijoles, raya los frijoles pintos. Gibson dietista puede indicarle cuánta fibra debe consumir cada día. Realice actividad física regularmente. La actividad física puede ayudarlo a alcanzar gibson objetivo de nivel de azúcar en preet y a controlar gibson peso. Sanjana al menos 150 minutos de [place] física Jacob de moderada a vigorosa cada semana. El entrenamiento de resistencia, raya el levantamiento de pesas, debe realizarse 3 veces por semana. No deje de realizarla rita más de 2 días seguidos. No permanezca sentada por más de 30 minutos cada vez. Gibson médico puede ayudarle a crear un plan de actividades. El plan puede incluir las mejores actividades para usted y puede ayudarlo a desarrollar gibson fuerza y Darreld Cliche. Mantenga un peso saludable. Pregúntele a gibson equipo cuál es el peso ideal para usted. El peso saludable puede ayudarle a controlar gibson diabetes y a evitar melba enfermedad cardíaca. Pídale a gibson equipo que lo ayude a elaborar un plan para perder peso, si lo necesita. Incluso reducir del 3% al 7% de gibson peso corporal puede ayudarlo a hacer melba diferencia en el control de gibson diabetes. Gibson equipo establecerá melba meta de pérdida de [place], de entre 10 a 15 libras o de un 5% de gibson sobrepeso. Juntos, usted y gibson equipo, podrán fijar metas de pérdida de peso alcanzables. Tómese gibson medicamento para la diabetes o la insulina según las indicaciones.  Es posible que necesite medicamento para la diabetes, insulina o ambos para controlar candis niveles de glucosa en preet. Gibson médico le indicará cómo y cuándo se debe manjinder gibson medicamento de diabetes o la insulina. También se le enseñarán los efectos secundarios que pueden causar los medicamentos orales para la diabetes. La insulina puede administrarse mediante inyección o melba bomba o melba pluma. Usted y los médicos decidirán cuál es el mejor método para usted:    La bomba de insulina es un dispositivo implantado que le da insulina las 24 horas del día. Melba bomba de insulina previene la necesidad de inyecciones múltiples de insulina en un día. La pluma para insulina es un dispositivo precargado con la cantidad [place] de insulina. A usted y gibson jeny les enseñarán cómo preparar y administrar la insulina si silva es el mejor método para usted. Candis médicos también le enseñarán cómo [facility] las agujas y [place]. Aprenderá la cantidad de insulina que necesita y cuándo administrarla. Se le enseñará cuándo no administrar la insulina. También se le enseñará lo que debe hacer si gibson nivel de azúcar en la preet baja demasiado. [place] puede suceder si usted nadira insulina y no come la cantidad [place] de carbohidratos. Más maneras para controlar la diabetes tipo 2:  Use identificación de alerta médica. Use un brazalete o collar de alerta médica o lleve consigo melba tarjeta que indique que tiene diabetes. Pregunte a gibson médico dónde puede conseguir esos artículos. No fume. La nicotina y otras sustancias químicas de los cigarrillos y los cigarros pueden dañar el pulmón y los vasos sanguíneos. También dificulta el control de la diabetes. Pida información a gibson médico si usted actualmente fuma y necesita ayuda para dejar de fumar. No use cigarrillos electrónicos o tabaco sin humo en vez de cigarrillos o para tratar de dejar de fumar. Todos estos aún contienen nicotina.     Revise candis pies todos los días por cortadas, raspones, callos u otras heridas. Mary pendiente de enrojecimiento e inflamación y de calor al tacto. Use zapatos que le calcen hal. Compruebe que no haya piedras u otros objetos dentro de salvatore zapatos que le podrían [employer]. No camine descalzo ni use zapatos sin calcetines. Use calcetines de algodón para ayudar a [place] Pregunte sobre las vacunas que pudiera necesitar. Usted corre un mayor riesgo de presentar enfermedades graves si se contagia gripe, neumonía, COVID-19 o hepatitis. Pregunte a gibson médico si debe vacunarse para prevenir estas u otras enfermedades, y cuándo debe hacerlo. Hable con gibson médico si se siente estresado por el cuidado de la diabetes. A veces, encajar el cuidado de la diabetes en gibson thiago puede provocar un aumento del estrés. El estrés puede hacer que no se cuide [facility]. Gibson médico puede ayudarlo con consejos sobre el autocuidado. El profesional de la jesus mental puede escuchar y ofrecer ayuda en cuestiones de cuidado personal. Otros tipos de terapia pueden ayudarlo a realizar cambios nutricionales o en gibson actividad física. Hágase un control de la A1c según las indicaciones. Gibson médico puede comprobar gibson A1c cada 3 meses, o 2 veces al año si gibson diabetes está controlada. El examen de A1c muestra la cantidad promedio de azúcar en gibson preet rita los últimos 2 a 3 meses. Gibson médico le dirá cuál debe ser gibson nivel de A1c. Hágase las pruebas de detección raya se le indique. Gibson médico podría recomendarle que se elvy pruebas para detectar complicaciones de la diabetes y otras condiciones que puedan desarrollarse. Algunas pruebas de detección pueden comenzar inmediatamente y otros pueden ocurrir dentro de los primeros 5 años del diagnóstico:    Los ejemplos de complicaciones de la diabetes incluyen problemas renales, colesterol alto, presión arterial dulce, problemas vasculares, problemas oculares y apnea del sueño.     Se le puede hacer melba prueba para detectar niveles bajos de vitamina B si nadira medicamentos orales para la diabetes rita mucho tiempo. Pueden hacerle pruebas para detectar el síndrome de ovario poliquístico (SOPQ) si tiene edad fecunda. Sanjana un seguimiento con gibson médico o con los proveedores del equipo de cuidado de la diabetes según las instrucciones: Es posible que a usted le tk análisis de preet antes de la david de control. [employer] de las pruebas mostrarán si es necesario hacer cambios en el tratamiento o en los cuidados personales. Hable con gibson médico si no puede pagar salvatore medicamentos. Anote salvatore preguntas para que se acuerde de hacerlas rita salvatore visitas. © Copyright [employer] 2023 Information is for End User's use only and may not be sold, redistributed or otherwise used for commercial purposes. Esta información es sólo para uso en educación. Gibson intención no es darle un consejo médico sobre enfermedades o tratamientos. Colsulte con gibson Eulice Punches farmacéutico antes de seguir cualquier régimen médico para saber si es seguro y efectivo para usted. Ejercicios de Kegel para mujeres   CUIDADO AMBULATORIO:   Los ejercicios de K[employer] ayudan a fortalecer los músculos pélvicos. Los músculos pélvicos sostienen los órganos de la pelvis, raya gibson vejiga y [place]. Los ejercicios de [employer] ayudan a prevenir o controlar ciertas afecciones, raya la incontinencia urinaria (pérdida de [place]) o el prolapso uterino. Llame a gibson médico o fisioterapeuta si:  No siente que los músculos se contraen o relajan. Continúa con pérdidas de [place]. Usted tiene preguntas o inquietudes acerca de gibson condición o cuidado. Uso correcto de los músculos: Los músculos pélvicos son los músculos que [place] para controlar el flujo de [place]. Para ubicar estos músculos, interrumpa y reinicie el flujo de [place] varias veces. Usted se familiarizará con la sensación de contraer y relajar estos músculos.   Cómo realizar los ejercicios de Kegel:  Prepare gibson cuerpo en melba posición cómoda. Puede acostarse, pararse o sentarse para hacer estos ejercicios. Cuando intente hacer estos ejercicios por primera vez, zurdo vez sea más fácil si se acuesta. Contraiga o tensione los músculos pélvicos lentamente. Usted seguramente sentirá raya si estuviera aguantando la orina o gases. Mantenga esta posición por 3 segundos. Relaje por 3 segundos. Repita silva ciclo 10 veces. No contenga la respiración cuando sanjana los ejercicios Kegel. Mantenga relajados los músculos del [place], espalda y piernas. Sanjana 10 series de ejercicios Kegel, al menos 3 veces por día. Cuando sepa cómo hacer los ejercicios de Kegel, use diferentes posiciones. [place] ayudará a fortalecer los músculos pélvicos tanto raya sea posible. Puede hacer estos ejercicios mientras está recostada en el suelo, viendo la televisión o de pie. Contraiga los músculos pélvicos antes de estornudar, toser o levantar algo para evitar un escape de [place]. Es posible que note melba mejora en el control de la vejiga dentro de unas 6 semanas. Acuda a salvatore consultas de control con gibson médico o fisioterapeuta según le indicaron: Anote salvatore preguntas para que se acuerde de hacerlas rita salvatore visitas. © Copyright Ryan Fischer 2023 Information is for End User's use only and may not be sold, redistributed or otherwise used for commercial purposes. Esta información es sólo para uso en educación. Gibson intención no es darle un consejo médico sobre enfermedades o tratamientos. Colsulte con gibson Vladimir Shane farmacéutico antes de seguir cualquier régimen médico para saber si es seguro y efectivo para usted.

## 2023-12-18 ENCOUNTER — VBI (OUTPATIENT)
Dept: ADMINISTRATIVE | Facility: OTHER | Age: 68
End: 2023-12-18

## 2023-12-19 ENCOUNTER — TELEPHONE (OUTPATIENT)
Dept: FAMILY MEDICINE CLINIC | Facility: CLINIC | Age: 68
End: 2023-12-19

## 2023-12-19 NOTE — TELEPHONE ENCOUNTER
[facility]  Amanda Romero     Communication with patient: left message for patient to return call     Reason for documentation: PCP referral related to Medication Adherence for Diabetic Care      Prednisone Counseling:  I discussed with the patient the risks of prolonged use of prednisone including but not limited to weight gain, insomnia, osteoporosis, mood changes, diabetes, susceptibility to infection, glaucoma and high blood pressure.  In cases where prednisone use is prolonged, patients should be monitored with blood pressure checks, serum glucose levels and an eye exam.  Additionally, the patient may need to be placed on GI prophylaxis, PCP prophylaxis, and calcium and vitamin D supplementation and/or a bisphosphonate.  The patient verbalized understanding of the proper use and the possible adverse effects of prednisone.  All of the patient's questions and concerns were addressed.

## 2023-12-27 NOTE — TELEPHONE ENCOUNTER
[facility]  Amanda Romero     Communication with patient: Attempt #2 to contact patient. Letter sent with information regarding medications.      Reason for documentation: PCP referral related to Medication Adherence for Diabetic Care

## 2024-01-03 ENCOUNTER — VBI (OUTPATIENT)
Dept: ADMINISTRATIVE | Facility: OTHER | Age: 69
End: 2024-01-03

## 2024-01-05 ENCOUNTER — VBI (OUTPATIENT)
Dept: ADMINISTRATIVE | Facility: OTHER | Age: 69
End: 2024-01-05

## 2024-01-09 ENCOUNTER — TELEPHONE (OUTPATIENT)
Dept: GYNECOLOGY | Facility: CLINIC | Age: 69
End: 2024-01-09

## 2024-01-09 DIAGNOSIS — B37.9 YEAST INFECTION: Primary | ICD-10-CM

## 2024-01-09 RX ORDER — FLUCONAZOLE 150 MG/1
150 TABLET ORAL ONCE
Qty: 2 TABLET | Refills: 0 | Status: SHIPPED | OUTPATIENT
Start: 2024-01-09 | End: 2024-01-09

## 2024-01-09 NOTE — TELEPHONE ENCOUNTER
Dr ROBLERO Patient has white discharge and itching.  Can you please send yeast infection meds to [facility] on [facility]?  Please advise

## 2024-01-09 NOTE — PATIENT INSTRUCTIONS
To call in 1-2 weeks of her leg symptoms persist  To follow up with test results Detail Level: Detailed Size Of Lesion: 0.2 cm

## 2024-02-19 ENCOUNTER — TELEPHONE (OUTPATIENT)
Age: 69
End: 2024-02-19

## 2024-02-19 NOTE — TELEPHONE ENCOUNTER
Appointment scheduled with provider.    Reason: Sick Visit    Symptoms: Regurgitation, she is able to eat and drink. Some foods are making her sick.     Provider: Dr. Pina Whipple    Date/Time: 2/20/2024 at 2:40 pm

## 2024-02-20 ENCOUNTER — OFFICE VISIT (OUTPATIENT)
Dept: FAMILY MEDICINE CLINIC | Facility: CLINIC | Age: 69
End: 2024-02-20
Payer: COMMERCIAL

## 2024-02-20 VITALS
DIASTOLIC BLOOD PRESSURE: 74 MMHG | OXYGEN SATURATION: 91 % | BODY MASS INDEX: 28.35 KG/M2 | SYSTOLIC BLOOD PRESSURE: 140 MMHG | WEIGHT: 140.6 LBS | HEIGHT: 59 IN | HEART RATE: 82 BPM | TEMPERATURE: 97.5 F

## 2024-02-20 DIAGNOSIS — E11.65 TYPE 2 DIABETES MELLITUS WITH HYPERGLYCEMIA, WITH LONG-TERM CURRENT USE OF INSULIN (HCC): ICD-10-CM

## 2024-02-20 DIAGNOSIS — N18.31 STAGE 3A CHRONIC KIDNEY DISEASE (HCC): ICD-10-CM

## 2024-02-20 DIAGNOSIS — Z79.4 TYPE 2 DIABETES MELLITUS WITH HYPERGLYCEMIA, WITH LONG-TERM CURRENT USE OF INSULIN (HCC): ICD-10-CM

## 2024-02-20 DIAGNOSIS — E11.42 TYPE 2 DIABETES MELLITUS WITH POLYNEUROPATHY (HCC): ICD-10-CM

## 2024-02-20 DIAGNOSIS — R07.81 RIB PAIN ON LEFT SIDE: Primary | ICD-10-CM

## 2024-02-20 DIAGNOSIS — I74.3 EMBOLISM AND THROMBOSIS OF ARTERIES OF THE LOWER EXTREMITIES (HCC): ICD-10-CM

## 2024-02-20 PROCEDURE — 99214 OFFICE O/P EST MOD 30 MIN: CPT | Performed by: FAMILY MEDICINE

## 2024-02-20 NOTE — PROGRESS NOTES
Subjective:    HPI  Virgie is a 68 y.o. female here today for:  Chief Complaint   Patient presents with    Heartburn     Took omeprazoke over the counter and it helped     Pain     On ribs but mostly on the left side   .      ---Above per clinical staff & reviewed. ---  HPI:  68yof here with complaints of heartburn  States it usually happens with spicy foods  Used OTC antacid that didn't really work  Then bought omeprazole and that worked  Discussed foods to avoid and not eating before bedtime   Due for follow up with endocrine  Has pain on left side at bottom of rib cage  Not watching diet much- had long discussion with pt about carbohydrates and the spicy foods  Says sugar is not great but doesn't check regularly- last A1C 13  Stressed importance of follow up with endocrine and diet  Will see back for regular follow up visit  Will get xray of rib area that is painful    The following portions of the patient's history were reviewed and updated as appropriate: allergies, current medications, past family history, past medical history, past social history, past surgical history and problem list.    Past Medical History:   Diagnosis Date    Absent pulse     DP pulse B/L    Depression     Diabetes (HCC)     HLD (hyperlipidemia)     Neuropathy     Osteopenia     Prominence of large joints     (L) SC    Weight loss        Past Surgical History:   Procedure Laterality Date    EYE SURGERY  2018    TRIGGER FINGER RELEASE Left 03/12/2021       Social History     Socioeconomic History    Marital status: /Civil Union     Spouse name: None    Number of children: None    Years of education: None    Highest education level: None   Occupational History    Occupation:  [employer]   Tobacco Use    Smoking status: Every Day     Current packs/day: 0.50     Average packs/day: 0.5 packs/day for 20.0 years (10.0 ttl pk-yrs)     Types: Cigarettes    Smokeless tobacco: Never   Vaping Use    Vaping status: Never Used   Substance  and Sexual Activity    Alcohol use: Not Currently    Drug use: Never    Sexual activity: Not Currently     Birth control/protection: Post-menopausal   Other Topics Concern    None   Social History Narrative    None     Social Determinants of Health     Financial Resource Strain: Low Risk  (12/14/2023)    Overall Financial Resource Strain (CARDIA)     Difficulty of Paying Living Expenses: Not hard at all   Food Insecurity: Not on file   Transportation Needs: No Transportation Needs (12/14/2023)    PRAPARE - Transportation     Lack of Transportation (Medical): No     Lack of Transportation (Non-Medical): No   Physical Activity: Not on file   Stress: Not on file (2/11/2021)   Social Connections: Not on file   Intimate Partner Violence: Low Risk  (4/11/2021)    Received [facility]    Intimate Partner Violence     Insults You: Not on file     Threatens You: Not on file     Screams at You: Not on file     Physically Hurt: Not on file     Intimate Partner Violence Score: Not on file   Housing Stability: Not on file       Current Outpatient Medications   Medication Sig Dispense Refill    acetaminophen (TYLENOL) 500 mg tablet every 8 (eight) hours      amlodipine-olmesartan (BRIANNA) 10-40 MG Take 1 tablet by mouth every evening      BD Pen Needle Veda 2nd Gen 32G X 4 MM MISC 1 STICK BY MISCELLANEOUS ROUTE 4 TIMES DAILY.      Continuous Blood Gluc Sensor (FreeStyle Maddy Sensor System) MISC FreeStyle Maddy 2 Sensor kit      ergocalciferol (VITAMIN D2) 50,000 units Take 50,000 Units by mouth      ezetimibe (ZETIA) 10 mg tablet Take 10 mg by mouth daily      fluticasone (FLONASE) 50 mcg/act nasal spray SPRAY 2 SPRAYS INTO EACH NOSTRIL EVERY DAY 48 mL 2    gabapentin (NEURONTIN) 300 mg capsule Take 1 capsule (300 mg total) by mouth 3 (three) times a day 90 capsule 0    ibandronate (BONIVA) 150 MG tablet Take 1 tablet (150 mg total) by mouth every 30 (thirty) days 3 tablet 3    insulin aspart (NOVOLOG FLEXPEN) 100 Units/mL  "injection pen Inject 10 Units under the skin 3 (three) times a day with meals for 30 days 3 pen 0    loratadine (CLARITIN) 10 mg tablet Take 1 tablet (10 mg total) by mouth daily 30 tablet 0    metFORMIN (GLUCOPHAGE-XR) 750 mg 24 hr tablet Take 750 mg by mouth 2 (two) times a day      omega-3-acid ethyl esters (LOVAZA) 1 g capsule Take 2 g by mouth 2 (two) times a day        omeprazole (PriLOSEC) 20 mg delayed release capsule TAKE 1 CAPSULE BY MOUTH EVERY DAY 90 capsule 1    OYSCO 500 + D 500-5 MG-MCG TABS Take 1 tablet by mouth 2 (two) times a day with meals      rosuvastatin (CRESTOR) 40 MG tablet TAKE 1 TABLET BY MOUTH EVERY DAY AT NIGHT      Trulicity 4.5 MG/0.5ML injection INJECT 4.5 MG SUBCUTANEOUSLY EVERY 7 DAYS      UltiCare Insulin Syringe 31G X 5/16\" 0.3 ML MISC       Difluprednate (Durezol) 0.05 % EMUL  (Patient not taking: Reported on 2/20/2024)       No current facility-administered medications for this visit.        Review of Systems   Constitutional: Negative.  Negative for chills and fever.   HENT: Negative.  Negative for ear pain and sore throat.    Eyes:  Negative for pain and visual disturbance.   Respiratory: Negative.  Negative for cough and shortness of breath.    Cardiovascular: Negative.  Negative for chest pain and palpitations.   Gastrointestinal:  Positive for abdominal pain. Negative for vomiting.        Rib pain left side'  heartburn   Genitourinary: Negative.  Negative for dysuria and hematuria.   Musculoskeletal:  Negative for arthralgias and back pain.   Skin:  Negative for color change and rash.   Neurological: Negative.  Negative for seizures and syncope.   Psychiatric/Behavioral: Negative.     All other systems reviewed and are negative.       Objective:    /74 (BP Location: Left arm, Patient Position: Sitting, Cuff Size: Adult)   Pulse 82   Temp 97.5 °F (36.4 °C) (Temporal)   Ht 4' 11\" (1.499 m)   Wt 63.8 kg (140 lb 9.6 oz)   SpO2 91%   BMI 28.40 kg/m²   Wt Readings " from Last 3 Encounters:   02/20/24 63.8 kg (140 lb 9.6 oz)   12/14/23 63.1 kg (139 lb 3.2 oz)   11/03/23 61.7 kg (136 lb)     BP Readings from Last 3 Encounters:   02/20/24 140/74   12/14/23 136/68   11/03/23 148/76       Lab Results   Component Value Date    WBC 7.70 01/31/2022    HGB 13.0 01/31/2022    HCT 37.0 01/31/2022     01/31/2022    TRIG 118 08/17/2018    HDL 44 08/17/2018    ALT 10 12/15/2023    AST 10 12/15/2023    K 5.0 12/15/2023     12/15/2023    CREATININE 0.86 12/15/2023    BUN 11 12/15/2023    CO2 28 12/15/2023    TSH 1.93 12/15/2023    GLUF 106 (H) 08/21/2019    HGBA1C 13.0 (H) 12/15/2023       Physical Exam  Vitals and nursing note reviewed.   Constitutional:       Appearance: Normal appearance. She is well-developed.   HENT:      Head: Normocephalic and atraumatic.   Eyes:      Pupils: Pupils are equal, round, and reactive to light.   Cardiovascular:      Rate and Rhythm: Normal rate and regular rhythm.      Heart sounds: No murmur heard.  Pulmonary:      Effort: Pulmonary effort is normal.      Breath sounds: Normal breath sounds.   Abdominal:      General: Abdomen is flat. Bowel sounds are normal. There is no distension.      Palpations: Abdomen is soft.      Tenderness: There is no guarding or rebound.      Comments: Tenderness at lower rib area on left axillary area  No epigastric tenderness on palpation  Abdomen soft, no rebound/guarding   Musculoskeletal:      Cervical back: Normal range of motion and neck supple.   Skin:     General: Skin is warm.      Capillary Refill: Capillary refill takes less than 2 seconds.   Neurological:      Mental Status: She is alert and oriented to person, place, and time.      Cranial Nerves: No cranial nerve deficit.   Psychiatric:         Mood and Affect: Mood normal.         Thought Content: Thought content normal.                       Assessment/Plan:   Virgie was seen today for heartburn and pain.    Diagnoses and all orders for this  visit:    Rib pain on left side  -     XR ribs left w pa chest min 3 views; Future    Embolism and thrombosis of arteries of the lower extremities (HCC)    Type 2 diabetes mellitus with hyperglycemia, with long-term current use of insulin (HCC)    Type 2 diabetes mellitus with polyneuropathy (HCC)    Stage 3a chronic kidney disease (HCC)      Return in about 3 months (around 5/20/2024) for Recheck.  Patient Instructions   Avoid spicy, acidic, citrus foods; caffeine; alcohol; minty candies; carbonated beverages; no eating 2-3 hours before bedtime.      Diet for Stomach Ulcers and Gastritis   WHAT YOU NEED TO KNOW:   A diet for stomach ulcers and gastritis is a meal plan that limits foods that irritate your stomach. Certain foods may worsen symptoms such as stomach pain, bloating, heartburn, or indigestion.  DISCHARGE INSTRUCTIONS:   Foods to limit or avoid:  You may need to avoid acidic, spicy, or high-fat foods. Not all foods affect everyone the same way. You will need to learn which foods worsen your symptoms and limit those foods. The following are some foods that may worsen ulcer or gastritis symptoms:  Beverages:      Whole milk and chocolate milk    Hot cocoa and cola    Any beverage with caffeine    Regular and decaffeinated coffee    Peppermint and spearmint tea    Green and black tea, with or without caffeine    Orange and grapefruit juices    Drinks that contain alcohol    Spices and seasonings:      Black and red pepper    Chili powder    Mustard seed and nutmeg    Other foods:      Dairy foods made from whole milk or cream    Chocolate    Spicy or strongly flavored cheeses, such as jalapeno or black pepper    Highly seasoned, high-fat meats, such as sausage, salami, helton, ham, and cold cuts    Hot chiles and peppers    Tomato products, such as tomato paste, tomato sauce, or tomato juice    Foods to include:  Eat a variety of healthy foods from all the food groups. Eat fruits, vegetables, whole grains,  and fat-free or low-fat dairy foods. Whole grains include whole-wheat breads, cereals, pasta, and brown rice. Choose lean meats, poultry (chicken and turkey), fish, beans, eggs, and nuts. A healthy meal plan is low in unhealthy fats, salt, and added sugar. Healthy fats include olive oil and canola oil. Ask your dietitian for more information about a healthy diet.       Other helpful guidelines:   Do not eat right before bedtime.  Stop eating at least 2 hours before bedtime.    Eat small, frequent meals.  Your stomach may tolerate small, frequent meals better than large meals.    © Copyright [employer] 2023 Information is for End User's use only and may not be sold, redistributed or otherwise used for commercial purposes.  The above information is an  only. It is not intended as medical advice for individual conditions or treatments. Talk to your doctor, nurse or pharmacist before following any medical regimen to see if it is safe and effective for you.

## 2024-02-20 NOTE — PATIENT INSTRUCTIONS
Avoid spicy, acidic, citrus foods; caffeine; alcohol; minty candies; carbonated beverages; no eating 2-3 hours before bedtime.      Diet for Stomach Ulcers and Gastritis   WHAT YOU NEED TO KNOW:   A diet for stomach ulcers and gastritis is a meal plan that limits foods that irritate your stomach. Certain foods may worsen symptoms such as stomach pain, bloating, heartburn, or indigestion.  DISCHARGE INSTRUCTIONS:   Foods to limit or avoid:  You may need to avoid acidic, spicy, or high-fat foods. Not all foods affect everyone the same way. You will need to learn which foods worsen your symptoms and limit those foods. The following are some foods that may worsen ulcer or gastritis symptoms:  Beverages:      Whole milk and chocolate milk    Hot cocoa and cola    Any beverage with caffeine    Regular and decaffeinated coffee    Peppermint and spearmint tea    Green and black tea, with or without caffeine    Orange and grapefruit juices    Drinks that contain alcohol    Spices and seasonings:      Black and red pepper    Chili powder    Mustard seed and nutmeg    Other foods:      Dairy foods made from whole milk or cream    Chocolate    Spicy or strongly flavored cheeses, such as jalapeno or black pepper    Highly seasoned, high-fat meats, such as sausage, salami, helton, ham, and cold cuts    Hot chiles and peppers    Tomato products, such as tomato paste, tomato sauce, or tomato juice    Foods to include:  Eat a variety of healthy foods from all the food groups. Eat fruits, vegetables, whole grains, and fat-free or low-fat dairy foods. Whole grains include whole-wheat breads, cereals, pasta, and brown rice. Choose lean meats, poultry (chicken and turkey), fish, beans, eggs, and nuts. A healthy meal plan is low in unhealthy fats, salt, and added sugar. Healthy fats include olive oil and canola oil. Ask your dietitian for more information about a healthy diet.       Other helpful guidelines:   Do not eat right before  bedtime.  Stop eating at least 2 hours before bedtime.    Eat small, frequent meals.  Your stomach may tolerate small, frequent meals better than large meals.    © Copyright [employer] 2023 Information is for End User's use only and may not be sold, redistributed or otherwise used for commercial purposes.  The above information is an  only. It is not intended as medical advice for individual conditions or treatments. Talk to your doctor, nurse or pharmacist before following any medical regimen to see if it is safe and effective for you.

## 2024-04-29 ENCOUNTER — VBI (OUTPATIENT)
Dept: ADMINISTRATIVE | Facility: OTHER | Age: 69
End: 2024-04-29

## 2024-05-01 ENCOUNTER — TELEPHONE (OUTPATIENT)
Age: 69
End: 2024-05-01

## 2024-05-01 NOTE — TELEPHONE ENCOUNTER
Patient has an apt on 05/28 and this apt needs to be reschedule. I called and left a message for patient to call us back to help her reschedule.

## 2024-05-20 ENCOUNTER — RA CDI HCC (OUTPATIENT)
Dept: OTHER | Facility: HOSPITAL | Age: 69
End: 2024-05-20

## 2024-05-20 NOTE — PROGRESS NOTES
HCC coding opportunities          Chart Reviewed number of suggestions sent to Provider: 3   e11.22  E11.51  E11.3393     Patients Insurance     Medicare Insurance: [employer]

## 2024-06-13 ENCOUNTER — VBI (OUTPATIENT)
Dept: ADMINISTRATIVE | Facility: OTHER | Age: 69
End: 2024-06-13

## 2024-06-13 NOTE — TELEPHONE ENCOUNTER
06/13/24 4:11 PM     Chart reviewed for CRC: Colonoscopy was/were not submitted to the patient's insurance.     Placido Richey   [facility]

## 2024-07-17 ENCOUNTER — VBI (OUTPATIENT)
Dept: ADMINISTRATIVE | Facility: OTHER | Age: 69
End: 2024-07-17

## 2024-07-17 NOTE — TELEPHONE ENCOUNTER
07/17/24 4:39 PM     Chart reviewed for Mammogram was/were not submitted to the patient's insurance.     Placido Richey   [facility]

## 2024-07-23 ENCOUNTER — VBI (OUTPATIENT)
Dept: ADMINISTRATIVE | Facility: OTHER | Age: 69
End: 2024-07-23

## 2024-07-23 NOTE — TELEPHONE ENCOUNTER
07/23/24 12:22 PM     Chart reviewed for Hemoglobin A1c was/were not submitted to the patient's insurance.     Placido Richey   [facility]

## 2024-08-06 ENCOUNTER — VBI (OUTPATIENT)
Dept: ADMINISTRATIVE | Facility: OTHER | Age: 69
End: 2024-08-06

## 2024-08-06 NOTE — TELEPHONE ENCOUNTER
08/06/24 3:22 PM     Chart reviewed for Diabetic Eye Exam was/were not submitted to the patient's insurance.     Placido Richey MA   [facility]

## 2024-08-12 ENCOUNTER — RA CDI HCC (OUTPATIENT)
Dept: OTHER | Facility: HOSPITAL | Age: 69
End: 2024-08-12

## 2024-08-28 ENCOUNTER — ANNUAL EXAM (OUTPATIENT)
Dept: GYNECOLOGY | Facility: CLINIC | Age: 69
End: 2024-08-28
Payer: COMMERCIAL

## 2024-08-28 VITALS
DIASTOLIC BLOOD PRESSURE: 60 MMHG | HEIGHT: 59 IN | HEART RATE: 84 BPM | WEIGHT: 141 LBS | BODY MASS INDEX: 28.43 KG/M2 | SYSTOLIC BLOOD PRESSURE: 112 MMHG

## 2024-08-28 DIAGNOSIS — Z12.31 ENCOUNTER FOR SCREENING MAMMOGRAM FOR MALIGNANT NEOPLASM OF BREAST: ICD-10-CM

## 2024-08-28 DIAGNOSIS — Z13.820 ENCOUNTER FOR SCREENING FOR OSTEOPOROSIS: ICD-10-CM

## 2024-08-28 DIAGNOSIS — Z12.4 SCREENING FOR CERVICAL CANCER: ICD-10-CM

## 2024-08-28 DIAGNOSIS — N39.3 SUI (STRESS URINARY INCONTINENCE, FEMALE): ICD-10-CM

## 2024-08-28 DIAGNOSIS — Z78.0 MENOPAUSE: ICD-10-CM

## 2024-08-28 DIAGNOSIS — Z01.419 ENCOUNTER FOR GYNECOLOGICAL EXAMINATION WITHOUT ABNORMAL FINDING: ICD-10-CM

## 2024-08-28 DIAGNOSIS — Z12.11 ENCOUNTER FOR SCREENING COLONOSCOPY: Primary | ICD-10-CM

## 2024-08-28 PROCEDURE — G0143 SCR C/V CYTO,THINLAYER,RESCR: HCPCS | Performed by: OBSTETRICS & GYNECOLOGY

## 2024-08-28 PROCEDURE — G0101 CA SCREEN;PELVIC/BREAST EXAM: HCPCS | Performed by: OBSTETRICS & GYNECOLOGY

## 2024-08-28 NOTE — PROGRESS NOTES
Ambulatory Visit  Name: Virgie Lieberman      : 1955      MRN: [id number]  Encounter Provider: Lauri Finnegan DO  Encounter Date: 2024   Encounter department: [facility]    Assessment & Plan   1. Encounter for screening colonoscopy  -     Ambulatory Referral to Gastroenterology; Future  2. Encounter for screening mammogram for malignant neoplasm of breast  -     Mammo screening bilateral w 3d & cad; Future  3. Menopause  -     DXA bone density spine hip and pelvis; Future  4. Encounter for screening for osteoporosis  -     DXA bone density spine hip and pelvis; Future  5. Encounter for gynecological examination without abnormal finding  6. OMAR (stress urinary incontinence, female)      History of Present Illness     Virgie Lieberman is a 68 y.o. female who presents for annual examination.  She offers no complaints.  Denies any vaginal irritation, burning, discharge or bleeding.  Denies any dysuria, hematuria or urgency.  She has mild infrequent tolerable stress urinary incontinence.  No GI complaints.    Colon cancer screening via Cologuard .  Negative.    DEXA scan 2022.  Osteoporosis.  Patient is presently on Boniva    Review of Systems   Constitutional: Negative.    HENT:  Negative for sore throat and trouble swallowing.    Gastrointestinal: Negative.    Genitourinary: Negative.      Past Medical History   Past Medical History:   Diagnosis Date    Absent pulse     DP pulse B/L    Depression     Diabetes (HCC)     HLD (hyperlipidemia)     Neuropathy     Osteopenia     Prominence of large joints     (L) SC    Weight loss      Past Surgical History:   Procedure Laterality Date    EYE SURGERY  2018    TRIGGER FINGER RELEASE Left 2021     Family History   Problem Relation Age of Onset    Alzheimer's disease Mother     Heart attack Father     Diabetes Sister     No Known Problems Daughter     No Known Problems Maternal Grandmother     No Known Problems  Paternal Grandmother     No Known Problems Sister     No Known Problems Sister     No Known Problems Sister     No Known Problems Sister     No Known Problems Sister     No Known Problems Sister     No Known Problems Daughter      Current Outpatient Medications on File Prior to Visit   Medication Sig Dispense Refill    acetaminophen (TYLENOL) 500 mg tablet every 8 (eight) hours      amlodipine-olmesartan (BRIANNA) 10-40 MG Take 1 tablet by mouth every evening      BD Pen Needle Veda 2nd Gen 32G X 4 MM MISC 1 STICK BY MISCELLANEOUS ROUTE 4 TIMES DAILY.      Continuous Blood Gluc Sensor (FreeStyle Maddy Sensor System) MISC FreeStyle Maddy 2 Sensor kit      Difluprednate (Durezol) 0.05 % EMUL  (Patient not taking: Reported on 2/20/2024)      ergocalciferol (VITAMIN D2) 50,000 units Take 50,000 Units by mouth      ezetimibe (ZETIA) 10 mg tablet Take 10 mg by mouth daily      fluticasone (FLONASE) 50 mcg/act nasal spray SPRAY 2 SPRAYS INTO EACH NOSTRIL EVERY DAY 48 mL 2    gabapentin (NEURONTIN) 300 mg capsule Take 1 capsule (300 mg total) by mouth 3 (three) times a day 90 capsule 0    ibandronate (BONIVA) 150 MG tablet Take 1 tablet (150 mg total) by mouth every 30 (thirty) days 3 tablet 3    insulin aspart (NOVOLOG FLEXPEN) 100 Units/mL injection pen Inject 10 Units under the skin 3 (three) times a day with meals for 30 days 3 pen 0    loratadine (CLARITIN) 10 mg tablet Take 1 tablet (10 mg total) by mouth daily 30 tablet 0    metFORMIN (GLUCOPHAGE-XR) 750 mg 24 hr tablet Take 750 mg by mouth 2 (two) times a day      omega-3-acid ethyl esters (LOVAZA) 1 g capsule Take 2 g by mouth 2 (two) times a day        omeprazole (PriLOSEC) 20 mg delayed release capsule TAKE 1 CAPSULE BY MOUTH EVERY DAY 90 capsule 1    OYSCO 500 + D 500-5 MG-MCG TABS Take 1 tablet by mouth 2 (two) times a day with meals      rosuvastatin (CRESTOR) 40 MG tablet TAKE 1 TABLET BY MOUTH EVERY DAY AT NIGHT      Trulicity 4.5 MG/0.5ML injection INJECT 4.5  "MG SUBCUTANEOUSLY EVERY 7 DAYS      UltiCare Insulin Syringe 31G X 5/16\" 0.3 ML MISC        No current facility-administered medications on file prior to visit.     Allergies   Allergen Reactions    Oxycodone Other (See Comments)     fatigue      Current Outpatient Medications on File Prior to Visit   Medication Sig Dispense Refill    acetaminophen (TYLENOL) 500 mg tablet every 8 (eight) hours      amlodipine-olmesartan (BRIANNA) 10-40 MG Take 1 tablet by mouth every evening      BD Pen Needle Veda 2nd Gen 32G X [facility]ICK BY MISCELLANEOUS ROUTE 4 TIMES DAILY.      Continuous Blood Gluc Sensor (FreeStyle Maddy Sensor System) MISC FreeStyle Maddy 2 Sensor kit      Difluprednate (Durezol) 0.05 % EMUL  (Patient not taking: Reported on 2/20/2024)      ergocalciferol (VITAMIN D2) 50,000 units Take 50,000 Units by mouth      ezetimibe (ZETIA) 10 mg tablet Take 10 mg by mouth daily      fluticasone (FLONASE) 50 mcg/act nasal spray SPRAY 2 SPRAYS INTO EACH NOSTRIL EVERY DAY 48 mL 2    gabapentin (NEURONTIN) 300 mg capsule Take 1 capsule (300 mg total) by mouth 3 (three) times a day 90 capsule 0    ibandronate (BONIVA) 150 MG tablet Take 1 tablet (150 mg total) by mouth every 30 (thirty) days 3 tablet 3    insulin aspart (NOVOLOG FLEXPEN) 100 Units/mL injection pen Inject 10 Units under the skin 3 (three) times a day with meals for 30 days 3 pen 0    loratadine (CLARITIN) 10 mg tablet Take 1 tablet (10 mg total) by mouth daily 30 tablet 0    metFORMIN (GLUCOPHAGE-XR) 750 mg 24 hr tablet Take 750 mg by mouth 2 (two) times a day      omega-3-acid ethyl esters (LOVAZA) 1 g capsule Take 2 g by mouth 2 (two) times a day        omeprazole (PriLOSEC) 20 mg delayed release capsule TAKE 1 CAPSULE BY MOUTH EVERY DAY 90 capsule 1    OYSCO 500 + D 500-5 MG-MCG TABS Take 1 tablet by mouth 2 (two) times a day with meals      rosuvastatin (CRESTOR) 40 MG tablet TAKE 1 TABLET BY MOUTH EVERY DAY AT NIGHT      Trulicity 4.5 MG/0.5ML " "injection INJECT 4.5 MG SUBCUTANEOUSLY EVERY 7 DAYS      UltiCare Insulin Syringe 31G X 5/16\" 0.3 ML MISC        No current facility-administered medications on file prior to visit.      Social History     Tobacco Use    Smoking status: Every Day     Current packs/day: 0.50     Average packs/day: 0.5 packs/day for 20.0 years (10.0 ttl pk-yrs)     Types: Cigarettes    Smokeless tobacco: Never   Vaping Use    Vaping status: Never Used   Substance and Sexual Activity    Alcohol use: Not Currently    Drug use: Never    Sexual activity: Not Currently     Birth control/protection: Post-menopausal     Objective     /60   Pulse 84   Ht 4' 11\" (1.499 m)   Wt 64 kg (141 lb)   BMI 28.48 kg/m²     Physical Exam  Vitals reviewed.   Constitutional:       Appearance: Normal appearance.   Cardiovascular:      Rate and Rhythm: Normal rate and regular rhythm.      Pulses: Normal pulses.      Heart sounds: Normal heart sounds. No murmur heard.  Pulmonary:      Effort: Pulmonary effort is normal. No respiratory distress.      Breath sounds: Normal breath sounds.   Chest:   Breasts:     Right: No swelling, bleeding, inverted nipple, mass, nipple discharge, skin change or tenderness.      Left: No swelling, bleeding, inverted nipple, mass, nipple discharge, skin change or tenderness.   Abdominal:      General: There is no distension.      Palpations: Abdomen is soft. There is no mass.      Tenderness: There is no abdominal tenderness. There is no guarding or rebound.      Hernia: No hernia is present. There is no hernia in the left inguinal area or right inguinal area.   Genitourinary:     General: Normal vulva.      Labia:         Right: No rash, tenderness or lesion.         Left: No rash, tenderness or lesion.       Vagina: Normal.      Cervix: Normal.      Uterus: Normal.       Adnexa:         Right: No mass, tenderness or fullness.          Left: No mass, tenderness or fullness.     Musculoskeletal:      Cervical back: " Normal range of motion and neck supple. No tenderness.   Lymphadenopathy:      Cervical: No cervical adenopathy.      Upper Body:      Right upper body: No supraclavicular, axillary or pectoral adenopathy.      Left upper body: No supraclavicular, axillary or pectoral adenopathy.      Lower Body: No right inguinal adenopathy. No left inguinal adenopathy.   Neurological:      Mental Status: She is alert.       Administrative Statements

## 2024-09-03 LAB
LAB AP GYN PRIMARY INTERPRETATION: NORMAL
Lab: NORMAL

## 2024-09-27 ENCOUNTER — OFFICE VISIT (OUTPATIENT)
Dept: URGENT CARE | Age: 69
End: 2024-09-27
Payer: COMMERCIAL

## 2024-09-27 VITALS
RESPIRATION RATE: 18 BRPM | SYSTOLIC BLOOD PRESSURE: 138 MMHG | BODY MASS INDEX: 28.48 KG/M2 | WEIGHT: 141 LBS | TEMPERATURE: 97.8 F | DIASTOLIC BLOOD PRESSURE: 72 MMHG | OXYGEN SATURATION: 97 % | HEART RATE: 72 BPM

## 2024-09-27 DIAGNOSIS — J30.9 ALLERGIC RHINITIS, UNSPECIFIED SEASONALITY, UNSPECIFIED TRIGGER: Primary | ICD-10-CM

## 2024-09-27 PROCEDURE — 99213 OFFICE O/P EST LOW 20 MIN: CPT

## 2024-09-27 RX ORDER — BENZONATATE 100 MG/1
100 CAPSULE ORAL 3 TIMES DAILY PRN
Qty: 20 CAPSULE | Refills: 0 | Status: SHIPPED | OUTPATIENT
Start: 2024-09-27

## 2024-09-27 NOTE — PROGRESS NOTES
[facility]        NAME: Virgie Lieberman is a 69 y.o. female  : 1955    MRN: [id number]  DATE: 2024  TIME: 11:33 AM    Assessment and Plan   Allergic rhinitis, unspecified seasonality, unspecified trigger [J30.9]  1. Allergic rhinitis, unspecified seasonality, unspecified trigger  benzonatate (TESSALON PERLES) 100 mg capsule        Sinus pressure/congestion for 3-4 days. No fevers. No dizziness/blurred vision. Used a nasal decongestant once. Has hx of seasonal allergies. No facial pain. Discussed symptom management.     Patient Instructions       Follow up with PCP in 3-5 days.  Proceed to  ER if symptoms worsen.    If tests have been performed at [facility], our office will contact you with results if changes need to be made to the care plan discussed with you at the visit.  [facility]    Chief Complaint     Chief Complaint   Patient presents with    Sinusitis     States feels a headache and feels like her eyes are heavy     Cough     Has had cough for about 4 days   States bringing up white-yellow mucous          History of Present Illness       Sinus pressure/congestion for 3-4 days. No fevers. No dizziness/blurred vision. Used a nasal decongestant once. Has hx of seasonal allergies. No facial pain. Discussed symptom management.     Sinusitis  Associated symptoms include congestion and coughing.   Cough  Associated symptoms include postnasal drip. Pertinent negatives include no fever. Her past medical history is significant for environmental allergies.       Review of Systems   Review of Systems   Constitutional:  Negative for fever.   HENT:  Positive for congestion and postnasal drip.    Respiratory:  Positive for cough.    Allergic/Immunologic: Positive for environmental allergies.   All other systems reviewed and are negative.        Current Medications       Current Outpatient Medications:     acetaminophen (TYLENOL) 500 mg tablet, every 8  "(eight) hours, Disp: , Rfl:     amlodipine-olmesartan (BRAINNA) 10-40 MG, Take 1 tablet by mouth every evening, Disp: , Rfl:     BD Pen Needle Veda 2nd Gen 32G X 4 MM MISC, 1 STICK BY MISCELLANEOUS ROUTE 4 TIMES DAILY., Disp: , Rfl:     benzonatate (TESSALON PERLES) 100 mg capsule, Take 1 capsule (100 mg total) by mouth 3 (three) times a day as needed for cough, Disp: 20 capsule, Rfl: 0    Continuous Blood Gluc Sensor (FreeStyle Maddy Sensor System) MISC, FreeStyle Maddy 2 Sensor kit, Disp: , Rfl:     ergocalciferol (VITAMIN D2) 50,000 units, Take 50,000 Units by mouth, Disp: , Rfl:     ezetimibe (ZETIA) 10 mg tablet, Take 10 mg by mouth daily, Disp: , Rfl:     fluticasone (FLONASE) 50 mcg/act nasal spray, SPRAY 2 SPRAYS INTO EACH NOSTRIL EVERY DAY, Disp: 48 mL, Rfl: 2    gabapentin (NEURONTIN) 300 mg capsule, Take 1 capsule (300 mg total) by mouth 3 (three) times a day, Disp: 90 capsule, Rfl: 0    ibandronate (BONIVA) 150 MG tablet, Take 1 tablet (150 mg total) by mouth every 30 (thirty) days, Disp: 3 tablet, Rfl: 3    loratadine (CLARITIN) 10 mg tablet, Take 1 tablet (10 mg total) by mouth daily, Disp: 30 tablet, Rfl: 0    metFORMIN (GLUCOPHAGE-XR) 750 mg 24 hr tablet, Take 750 mg by mouth 2 (two) times a day, Disp: , Rfl:     omega-3-acid ethyl esters (LOVAZA) 1 g capsule, Take 2 g by mouth 2 (two) times a day  , Disp: , Rfl:     omeprazole (PriLOSEC) 20 mg delayed release capsule, TAKE 1 CAPSULE BY MOUTH EVERY DAY, Disp: 90 capsule, Rfl: 1    OYSCO 500 + D 500-5 MG-MCG TABS, Take 1 tablet by mouth 2 (two) times a day with meals, Disp: , Rfl:     rosuvastatin (CRESTOR) 40 MG tablet, TAKE 1 TABLET BY MOUTH EVERY DAY AT NIGHT, Disp: , Rfl:     Trulicity 4.5 MG/0.5ML injection, INJECT 4.5 MG SUBCUTANEOUSLY EVERY 7 DAYS, Disp: , Rfl:     UltiCare Insulin Syringe 31G X 5/16\" 0.3 ML MISC, , Disp: , Rfl:     Difluprednate (Durezol) 0.05 % EMUL, , Disp: , Rfl:     insulin aspart (NOVOLOG FLEXPEN) 100 Units/mL injection pen, " Inject 10 Units under the skin 3 (three) times a day with meals for 30 days, Disp: 3 pen, Rfl: 0    Current Allergies     Allergies as of 09/27/2024 - Reviewed 09/27/2024   Allergen Reaction Noted    Oxycodone Other (See Comments) 01/13/2021            The following portions of the patient's history were reviewed and updated as appropriate: allergies, current medications, past family history, past medical history, past social history, past surgical history and problem list.     Past Medical History:   Diagnosis Date    Absent pulse     DP pulse B/L    Depression     Diabetes (HCC)     HLD (hyperlipidemia)     Neuropathy     Osteopenia     Prominence of large joints     (L) SC    Weight loss        Past Surgical History:   Procedure Laterality Date    EYE SURGERY  2018    TRIGGER FINGER RELEASE Left 03/12/2021       Family History   Problem Relation Age of Onset    Alzheimer's disease Mother     Heart attack Father     Diabetes Sister     No Known Problems Daughter     No Known Problems Maternal Grandmother     No Known Problems Paternal Grandmother     No Known Problems Sister     No Known Problems Sister     No Known Problems Sister     No Known Problems Sister     No Known Problems Sister     No Known Problems Sister     No Known Problems Daughter          Medications have been verified.        Objective   /72   Pulse 72   Temp 97.8 °F (36.6 °C)   Resp 18   Wt 64 kg (141 lb)   SpO2 97%   BMI 28.48 kg/m²   No LMP recorded. Patient is postmenopausal.       Physical Exam     Physical Exam  Vitals reviewed.   Constitutional:       Appearance: Normal appearance.   HENT:      Right Ear: Tympanic membrane normal.      Left Ear: Tympanic membrane normal.      Nose: Congestion and rhinorrhea present.   Cardiovascular:      Rate and Rhythm: Normal rate and regular rhythm.      Pulses: Normal pulses.      Heart sounds: Normal heart sounds.   Pulmonary:      Effort: Pulmonary effort is normal.      Breath sounds:  Normal breath sounds.   Musculoskeletal:         General: Normal range of motion.   Lymphadenopathy:      Cervical: No cervical adenopathy.   Neurological:      Mental Status: She is alert.

## 2024-10-02 ENCOUNTER — VBI (OUTPATIENT)
Dept: ADMINISTRATIVE | Facility: OTHER | Age: 69
End: 2024-10-02

## 2024-10-02 NOTE — TELEPHONE ENCOUNTER
10/02/24 12:05 PM     Chart reviewed for Diabetic Eye Exam was/were not submitted to the patient's insurance.     Placido Richey MA   [facility]

## 2024-10-04 ENCOUNTER — OFFICE VISIT (OUTPATIENT)
Dept: FAMILY MEDICINE CLINIC | Facility: CLINIC | Age: 69
End: 2024-10-04
Payer: COMMERCIAL

## 2024-10-04 VITALS
WEIGHT: 140 LBS | HEART RATE: 83 BPM | RESPIRATION RATE: 16 BRPM | HEIGHT: 59 IN | TEMPERATURE: 97.6 F | SYSTOLIC BLOOD PRESSURE: 136 MMHG | DIASTOLIC BLOOD PRESSURE: 70 MMHG | OXYGEN SATURATION: 98 % | BODY MASS INDEX: 28.22 KG/M2

## 2024-10-04 DIAGNOSIS — R09.82 POST-NASAL DRAINAGE: ICD-10-CM

## 2024-10-04 DIAGNOSIS — E78.49 OTHER HYPERLIPIDEMIA: ICD-10-CM

## 2024-10-04 DIAGNOSIS — H93.8X2 BLOCKED EAR, LEFT: ICD-10-CM

## 2024-10-04 DIAGNOSIS — E11.65 TYPE 2 DIABETES MELLITUS WITH HYPERGLYCEMIA, WITH LONG-TERM CURRENT USE OF INSULIN (HCC): Primary | ICD-10-CM

## 2024-10-04 DIAGNOSIS — H69.93 ETD (EUSTACHIAN TUBE DYSFUNCTION), BILATERAL: ICD-10-CM

## 2024-10-04 DIAGNOSIS — N18.31 STAGE 3A CHRONIC KIDNEY DISEASE (HCC): ICD-10-CM

## 2024-10-04 DIAGNOSIS — Z79.4 TYPE 2 DIABETES MELLITUS WITH HYPERGLYCEMIA, WITH LONG-TERM CURRENT USE OF INSULIN (HCC): Primary | ICD-10-CM

## 2024-10-04 DIAGNOSIS — I10 ESSENTIAL HYPERTENSION: ICD-10-CM

## 2024-10-04 DIAGNOSIS — E11.42 TYPE 2 DIABETES MELLITUS WITH POLYNEUROPATHY (HCC): ICD-10-CM

## 2024-10-04 DIAGNOSIS — M81.0 AGE-RELATED OSTEOPOROSIS WITHOUT CURRENT PATHOLOGICAL FRACTURE: ICD-10-CM

## 2024-10-04 PROBLEM — D72.829 LEUKOCYTOSIS: Status: RESOLVED | Noted: 2019-08-20 | Resolved: 2024-10-04

## 2024-10-04 PROBLEM — S00.93XA HEAD CONTUSION: Status: RESOLVED | Noted: 2021-11-24 | Resolved: 2024-10-04

## 2024-10-04 PROBLEM — I74.3 EMBOLISM AND THROMBOSIS OF ARTERIES OF THE LOWER EXTREMITIES (HCC): Status: RESOLVED | Noted: 2023-06-13 | Resolved: 2024-10-04

## 2024-10-04 PROBLEM — E78.5 HLD (HYPERLIPIDEMIA): Status: RESOLVED | Noted: 2019-08-19 | Resolved: 2024-10-04

## 2024-10-04 PROBLEM — Z72.0 TOBACCO ABUSE: Status: RESOLVED | Noted: 2019-08-19 | Resolved: 2024-10-04

## 2024-10-04 PROBLEM — R55 SYNCOPE: Status: RESOLVED | Noted: 2022-03-05 | Resolved: 2024-10-04

## 2024-10-04 PROBLEM — R61 DIAPHORESIS: Status: RESOLVED | Noted: 2019-08-19 | Resolved: 2024-10-04

## 2024-10-04 PROBLEM — R79.89 ELEVATED PLATELET COUNT: Status: RESOLVED | Noted: 2021-11-24 | Resolved: 2024-10-04

## 2024-10-04 PROBLEM — J20.9 ACUTE BRONCHITIS: Status: RESOLVED | Noted: 2019-08-19 | Resolved: 2024-10-04

## 2024-10-04 PROBLEM — R03.0 ELEVATED BLOOD PRESSURE READING: Status: RESOLVED | Noted: 2019-08-19 | Resolved: 2024-10-04

## 2024-10-04 PROBLEM — Z53.20 HIV SCREENING DECLINED: Status: RESOLVED | Noted: 2021-05-06 | Resolved: 2024-10-04

## 2024-10-04 PROBLEM — R09.89 DECREASED PEDAL PULSES: Status: RESOLVED | Noted: 2021-11-24 | Resolved: 2024-10-04

## 2024-10-04 PROCEDURE — 99204 OFFICE O/P NEW MOD 45 MIN: CPT | Performed by: FAMILY MEDICINE

## 2024-10-04 RX ORDER — INSULIN GLARGINE 100 [IU]/ML
INJECTION, SOLUTION SUBCUTANEOUS
COMMUNITY
Start: 2024-08-07

## 2024-10-04 RX ORDER — FLUTICASONE PROPIONATE 50 MCG
1 SPRAY, SUSPENSION (ML) NASAL DAILY
Qty: 48 ML | Refills: 2 | Status: SHIPPED | OUTPATIENT
Start: 2024-10-04

## 2024-10-04 RX ORDER — GUAIFENESIN 600 MG/1
600 TABLET, EXTENDED RELEASE ORAL EVERY 12 HOURS SCHEDULED
Qty: 60 TABLET | Refills: 2 | Status: SHIPPED | OUTPATIENT
Start: 2024-10-04

## 2024-10-04 RX ORDER — METOPROLOL SUCCINATE 50 MG/1
50 TABLET, EXTENDED RELEASE ORAL DAILY
COMMUNITY
Start: 2024-03-26 | End: 2025-03-26

## 2024-10-04 NOTE — ASSESSMENT & PLAN NOTE
Lab Results   Component Value Date    EGFR 85 07/25/2024    EGFR 73 12/15/2023    EGFR 75 04/18/2023    CREATININE 0.76 07/25/2024    CREATININE 0.86 12/15/2023    CREATININE 0.85 04/18/2023   Well-controlled.  Continue to encourage oral hydration.  Continue to monitor.

## 2024-10-04 NOTE — ASSESSMENT & PLAN NOTE
Not well-controlled.  Discussed with patient to continue to follow-up with her endocrinologist.  Discussed about low-carb diet.  She is to continue on her Lantus and her insulin pump.  Will continue on metformin and Trulicity.  Come back in 1 month with blood work.  Lab Results   Component Value Date    HGBA1C 10.1 (H) 07/25/2024

## 2024-10-04 NOTE — ASSESSMENT & PLAN NOTE
Lab Results   Component Value Date    HGBA1C 10.1 (H) 07/25/2024       Orders:    CBC and differential; Future    Hemoglobin A1C; Future    Comprehensive metabolic panel; Future    Lipid Panel with Direct LDL reflex; Future    TSH, 3rd generation with Free T4 reflex; Future

## 2024-10-04 NOTE — ASSESSMENT & PLAN NOTE
Not well-controlled.  Discussed about low-fat diet and regular exercise.  She was told to use her medication as prescribed.  I will repeat the blood work in 1 month.

## 2024-10-04 NOTE — ASSESSMENT & PLAN NOTE
She was given prescription for Mucinex and was told to use Flonase nasal spray.  Will continue to monitor.    Orders:    guaiFENesin (MUCINEX) 600 mg 12 hr tablet; Take 1 tablet (600 mg total) by mouth every 12 (twelve) hours

## 2024-10-04 NOTE — PROGRESS NOTES
Ambulatory Visit  Name: Virgie Lieberman      : 1955      MRN: [id number]  Encounter Provider: Pramod Arora MD  Encounter Date: 10/4/2024   Encounter department: [facility]    Assessment & Plan  Type 2 diabetes mellitus with hyperglycemia, with long-term current use of insulin (HCC)    Lab Results   Component Value Date    HGBA1C 10.1 (H) 2024       Orders:    CBC and differential; Future    Hemoglobin A1C; Future    Comprehensive metabolic panel; Future    Lipid Panel with Direct LDL reflex; Future    TSH, 3rd generation with Free T4 reflex; Future    Blocked ear, left    Orders:    fluticasone (FLONASE) 50 mcg/act nasal spray; 1 spray into each nostril daily    ETD (Eustachian tube dysfunction), bilateral    Orders:    fluticasone (FLONASE) 50 mcg/act nasal spray; 1 spray into each nostril daily    Post-nasal drainage  She was given prescription for Mucinex and was told to use Flonase nasal spray.  Will continue to monitor.    Orders:    guaiFENesin (MUCINEX) 600 mg 12 hr tablet; Take 1 tablet (600 mg total) by mouth every 12 (twelve) hours    Type 2 diabetes mellitus with polyneuropathy (HCC)  Not well-controlled.  Discussed with patient to continue to follow-up with her endocrinologist.  Discussed about low-carb diet.  She is to continue on her Lantus and her insulin pump.  Will continue on metformin and Trulicity.  Come back in 1 month with blood work.  Lab Results   Component Value Date    HGBA1C 10.1 (H) 2024            Age-related osteoporosis without current pathological fracture  Continue on Boniva.  I will consider switching her to Prolia.         Stage 3a chronic kidney disease (HCC)  Lab Results   Component Value Date    EGFR 85 2024    EGFR 73 12/15/2023    EGFR 75 2023    CREATININE 0.76 2024    CREATININE 0.86 12/15/2023    CREATININE 0.85 2023   Well-controlled.  Continue to encourage oral hydration.  Continue to monitor.          Other hyperlipidemia  Not well-controlled.  Discussed about low-fat diet and regular exercise.  She was told to use her medication as prescribed.  I will repeat the blood work in 1 month.         Essential hypertension  Well-controlled.  Continue same.  We will continue monitor.              History of Present Illness     She is here today as a new patient to establish and follow-up multiple medical problems.  She has been taking her medications but she does not take them as prescribed.  She follow-up with endocrinologist for her diabetes mellitus type 2 with hyperglycemia.  Her last A1c was not well-controlled at 10.1.  Previously was elevated at 13.  Her blood work came back showing elevated cholesterol.  She is on the Crestor 40 mg daily but she does not take it as prescribed.  She complained of having problems with postnasal drip for the last few months.  She denies any other upper respiratory symptoms.  Denies any fever or chills.  Denies any shortness of breath.      Review of Systems   Constitutional:  Negative for chills and fever.   HENT:  Positive for postnasal drip. Negative for trouble swallowing.    Eyes:  Negative for visual disturbance.   Respiratory:  Negative for cough and shortness of breath.    Cardiovascular:  Negative for chest pain, palpitations and leg swelling.   Gastrointestinal:  Negative for abdominal pain, constipation and diarrhea.   Endocrine: Negative for cold intolerance and heat intolerance.   Genitourinary:  Negative for difficulty urinating and dysuria.   Musculoskeletal:  Negative for gait problem.   Skin:  Negative for rash.   Neurological:  Negative for dizziness, tremors, seizures and headaches.   Hematological:  Negative for adenopathy.   Psychiatric/Behavioral:  Negative for behavioral problems.      Past Medical History:   Diagnosis Date    Absent pulse     DP pulse B/L    Depression     Diabetes (HCC)     HLD (hyperlipidemia)     Neuropathy     Osteopenia     Prominence  of large joints     (L) SC    Weight loss      Past Surgical History:   Procedure Laterality Date    EYE SURGERY  2018    TRIGGER FINGER RELEASE Left 03/12/2021     Family History   Problem Relation Age of Onset    Alzheimer's disease Mother     Heart attack Father     Diabetes Sister     No Known Problems Daughter     No Known Problems Maternal Grandmother     No Known Problems Paternal Grandmother     No Known Problems Sister     No Known Problems Sister     No Known Problems Sister     No Known Problems Sister     No Known Problems Sister     No Known Problems Sister     No Known Problems Daughter      Social History     Tobacco Use    Smoking status: Every Day     Current packs/day: 0.50     Average packs/day: 0.5 packs/day for 20.0 years (10.0 ttl pk-yrs)     Types: Cigarettes    Smokeless tobacco: Never   Vaping Use    Vaping status: Never Used   Substance and Sexual Activity    Alcohol use: Not Currently    Drug use: Never    Sexual activity: Not Currently     Birth control/protection: Post-menopausal     Current Outpatient Medications on File Prior to Visit   Medication Sig    acetaminophen (TYLENOL) 500 mg tablet every 8 (eight) hours    amlodipine-olmesartan (BRIANNA) 10-40 MG Take 1 tablet by mouth every evening    BD Pen Needle Veda 2nd Gen 32G X 4 MM MISC 1 STICK BY MISCELLANEOUS ROUTE 4 TIMES DAILY.    Continuous Blood Gluc Sensor (FreeStyle Maddy Sensor System) MISC FreeStyle Maddy 2 Sensor kit    ergocalciferol (VITAMIN D2) 50,000 units Take 50,000 Units by mouth    ezetimibe (ZETIA) 10 mg tablet Take 10 mg by mouth daily    gabapentin (NEURONTIN) 300 mg capsule Take 1 capsule (300 mg total) by mouth 3 (three) times a day    Lantus SoloStar 100 units/mL SOPN Inject 40 units sc qhs    loratadine (CLARITIN) 10 mg tablet Take 1 tablet (10 mg total) by mouth daily    metFORMIN (GLUCOPHAGE-XR) 750 mg 24 hr tablet Take 750 mg by mouth 2 (two) times a day    metoprolol succinate (TOPROL-XL) 50 mg 24 hr tablet  "Take 50 mg by mouth daily    omega-3-acid ethyl esters (LOVAZA) 1 g capsule Take 2 g by mouth 2 (two) times a day      omeprazole (PriLOSEC) 20 mg delayed release capsule TAKE 1 CAPSULE BY MOUTH EVERY DAY    OYSCO 500 + D 500-5 MG-MCG TABS Take 1 tablet by mouth 2 (two) times a day with meals    rosuvastatin (CRESTOR) 40 MG tablet TAKE 1 TABLET BY MOUTH EVERY DAY AT NIGHT    Trulicity 4.5 MG/0.5ML injection INJECT 4.5 MG SUBCUTANEOUSLY EVERY 7 DAYS    UltiCare Insulin Syringe 31G X 5/16\" 0.3 ML MISC     benzonatate (TESSALON PERLES) 100 mg capsule Take 1 capsule (100 mg total) by mouth 3 (three) times a day as needed for cough (Patient not taking: Reported on 10/4/2024)    Difluprednate (Durezol) 0.05 % EMUL  (Patient not taking: Reported on 2/20/2024)    ibandronate (BONIVA) 150 MG tablet Take 1 tablet (150 mg total) by mouth every 30 (thirty) days (Patient not taking: Reported on 10/4/2024)    insulin aspart (NOVOLOG FLEXPEN) 100 Units/mL injection pen Inject 10 Units under the skin 3 (three) times a day with meals for 30 days    [DISCONTINUED] fluticasone (FLONASE) 50 mcg/act nasal spray SPRAY 2 SPRAYS INTO EACH NOSTRIL EVERY DAY (Patient not taking: Reported on 10/4/2024)     Allergies   Allergen Reactions    Oxycodone Other (See Comments)     fatigue     Immunization History   Administered Date(s) Administered    COVID-19 PFIZER VACCINE 0.3 ML IM 03/16/2021, 04/06/2021, 12/17/2021    COVID-19 Pfizer mRNA vacc PF keegan-sucrose 12 yr and older (Comirnaty) 10/09/2023    INFLUENZA 09/23/2009, 10/09/2012, 10/01/2013, 10/10/2018, 10/05/2020, 12/08/2021, 10/09/2023    Influenza Injectable, MDCK, Preservative Free, Quadrivalent, 0.5 mL 10/10/2018    Influenza, seasonal, injectable 10/06/2015, 10/05/2017    Influenza, seasonal, injectable, preservative free 10/01/2014    Pneumococcal Conjugate Vaccine 20-valent (Pcv20), Polysace 12/14/2023    Pneumococcal Polysaccharide PPV23 01/13/2021    Respiratory Syncytial Virus " "Vaccine (Recombinant) 02/27/2024    Tdap 12/19/2017, 10/16/2019    Zoster Vaccine Recombinant 06/03/2023     Objective     /70 (BP Location: Left arm, Patient Position: Sitting, Cuff Size: Large)   Pulse 83   Temp 97.6 °F (36.4 °C) (Tympanic)   Resp 16   Ht 4' 11\" (1.499 m)   Wt 63.5 kg (140 lb)   SpO2 98%   BMI 28.28 kg/m²     Physical Exam  Vitals and nursing note reviewed.   Constitutional:       Appearance: She is well-developed.   HENT:      Head: Normocephalic and atraumatic.   Eyes:      Pupils: Pupils are equal, round, and reactive to light.   Cardiovascular:      Rate and Rhythm: Normal rate and regular rhythm.      Heart sounds: Normal heart sounds.   Pulmonary:      Effort: Pulmonary effort is normal.      Breath sounds: Normal breath sounds.   Abdominal:      General: Bowel sounds are normal.      Palpations: Abdomen is soft.   Musculoskeletal:      Cervical back: Normal range of motion and neck supple.   Lymphadenopathy:      Cervical: No cervical adenopathy.   Skin:     General: Skin is warm.   Neurological:      Mental Status: She is alert and oriented to person, place, and time.         "

## 2024-10-10 ENCOUNTER — TELEPHONE (OUTPATIENT)
Age: 69
End: 2024-10-10

## 2024-10-10 NOTE — TELEPHONE ENCOUNTER
Scheduled date of colonoscopy (as of today):  11/18/24    Physician performing colonoscopy: [facility]    Location of colonoscopy: [facility]    Bowel prep reviewed with patient: FILIPPO / DUL / DIABETIC    Instructions reviewed with patient by: please mail bowel and diabetic prep instructions     Clearances: N/A

## 2024-10-10 NOTE — TELEPHONE ENCOUNTER
10/10/24  Screened by: Verona Mccall    Referring Provider     Pre- Screening:     There is no height or weight on file to calculate BMI.  Has patient been referred for a routine screening Colonoscopy? yes  Is the patient between 45-75 years old? yes      Previous Colonoscopy no   If yes:    Date:     Facility:     Reason:       Does the patient want to see a Gastroenterologist prior to their procedure OR are they having any GI symptoms? no    Has the patient been hospitalized or had abdominal surgery in the past 6 months? no    Does the patient use supplemental oxygen? no    Does the patient take Coumadin, Lovenox, Plavix, Elliquis, Xarelto, or other blood thinning medication? no    Has the patient had a stroke, cardiac event, or stent placed in the past year? no    If patient is between 45yrs - 49yrs, please advise patient that we will have to confirm benefits & coverage with their insurance company for a routine screening colonoscopy.

## 2024-10-21 ENCOUNTER — RA CDI HCC (OUTPATIENT)
Dept: OTHER | Facility: HOSPITAL | Age: 69
End: 2024-10-21

## 2024-11-04 ENCOUNTER — ANESTHESIA (OUTPATIENT)
Dept: ANESTHESIOLOGY | Facility: HOSPITAL | Age: 69
End: 2024-11-04

## 2024-11-04 ENCOUNTER — ANESTHESIA EVENT (OUTPATIENT)
Dept: ANESTHESIOLOGY | Facility: HOSPITAL | Age: 69
End: 2024-11-04

## 2024-11-05 ENCOUNTER — VBI (OUTPATIENT)
Dept: ADMINISTRATIVE | Facility: OTHER | Age: 69
End: 2024-11-05

## 2024-11-05 NOTE — TELEPHONE ENCOUNTER
11/05/24 10:28 AM     Chart reviewed for Hemoglobin A1c was/were not submitted to the patient's insurance.     Placido Richey MA   [facility]

## 2024-11-06 ENCOUNTER — TELEPHONE (OUTPATIENT)
Dept: GASTROENTEROLOGY | Facility: MEDICAL CENTER | Age: 69
End: 2024-11-06

## 2024-11-06 NOTE — TELEPHONE ENCOUNTER
Confirming Upcoming Procedure: Colonoscopy on November 18  Physician performing: Dr. Macdonald  Location of procedure:  [facility]  Prep: Miralax  Diabetic: Lantus, Novolog, Metformin, Trulicity-hold for 4 days

## 2024-11-11 NOTE — TELEPHONE ENCOUNTER
Left voicemail to confirm procedure and remind of Trulicity hold for 4 days prior and to follow additional diabetic medication instructions;  and requested call back with any questions or if reschedule is needed; directed patient to [facility] for additional information and instructions

## 2024-11-14 ENCOUNTER — VBI (OUTPATIENT)
Dept: ADMINISTRATIVE | Facility: OTHER | Age: 69
End: 2024-11-14

## 2024-11-14 NOTE — TELEPHONE ENCOUNTER
11/14/24 1:51 PM     Chart reviewed for Blood Pressure was/were not submitted to the patient's insurance.     Placido Richey MA   [facility]

## 2024-12-02 ENCOUNTER — VBI (OUTPATIENT)
Dept: ADMINISTRATIVE | Facility: OTHER | Age: 69
End: 2024-12-02

## 2024-12-02 NOTE — TELEPHONE ENCOUNTER
12/02/24 12:22 PM     Chart reviewed for Mammogram was/were not submitted to the patient's insurance.     Placido Richey MA   [facility]

## 2024-12-05 ENCOUNTER — VBI (OUTPATIENT)
Dept: ADMINISTRATIVE | Facility: OTHER | Age: 69
End: 2024-12-05

## 2024-12-05 NOTE — TELEPHONE ENCOUNTER
12/05/24 2:26 PM     Chart reviewed for Hemoglobin A1c was/were not submitted to the patient's insurance.     Placido Richey MA   [facility]

## 2024-12-06 ENCOUNTER — VBI (OUTPATIENT)
Dept: ADMINISTRATIVE | Facility: OTHER | Age: 69
End: 2024-12-06

## 2024-12-06 NOTE — TELEPHONE ENCOUNTER
12/06/24 12:36 PM     Chart reviewed for Diabetic Eye Exam was/were not submitted to the patient's insurance.     Placido Richey MA   [facility]

## 2024-12-19 ENCOUNTER — VBI (OUTPATIENT)
Dept: ADMINISTRATIVE | Facility: OTHER | Age: 69
End: 2024-12-19

## 2024-12-19 NOTE — TELEPHONE ENCOUNTER
12/19/24 2:28 PM     Chart reviewed for CRC: Colonoscopy was/were not submitted to the patient's insurance.     Placido Richey MA   [facility]   Yes

## 2025-01-07 ENCOUNTER — VBI (OUTPATIENT)
Dept: ADMINISTRATIVE | Facility: OTHER | Age: 70
End: 2025-01-07

## 2025-01-07 NOTE — TELEPHONE ENCOUNTER
01/07/25 1:49 PM     Chart reviewed for Diabetic Eye Exam was/were not submitted to the patient's insurance.     Placido Richey MA   [facility]

## 2025-02-18 ENCOUNTER — TELEPHONE (OUTPATIENT)
Dept: GASTROENTEROLOGY | Facility: MEDICAL CENTER | Age: 70
End: 2025-02-18

## 2025-02-18 NOTE — TELEPHONE ENCOUNTER
Called patient to reschedule colonoscopy    Left voicemail and requested call back to reschedule    Sent letter

## 2025-03-12 ENCOUNTER — OFFICE VISIT (OUTPATIENT)
Dept: FAMILY MEDICINE CLINIC | Facility: CLINIC | Age: 70
End: 2025-03-12
Payer: COMMERCIAL

## 2025-03-12 VITALS
WEIGHT: 149.4 LBS | HEART RATE: 77 BPM | RESPIRATION RATE: 16 BRPM | DIASTOLIC BLOOD PRESSURE: 68 MMHG | HEIGHT: 59 IN | TEMPERATURE: 98.1 F | OXYGEN SATURATION: 97 % | BODY MASS INDEX: 30.12 KG/M2 | SYSTOLIC BLOOD PRESSURE: 136 MMHG

## 2025-03-12 DIAGNOSIS — M54.42 ACUTE BILATERAL LOW BACK PAIN WITH BILATERAL SCIATICA: ICD-10-CM

## 2025-03-12 DIAGNOSIS — N18.31 STAGE 3A CHRONIC KIDNEY DISEASE (HCC): ICD-10-CM

## 2025-03-12 DIAGNOSIS — Z00.00 HEALTHCARE MAINTENANCE: ICD-10-CM

## 2025-03-12 DIAGNOSIS — E11.42 TYPE 2 DIABETES MELLITUS WITH POLYNEUROPATHY (HCC): Primary | ICD-10-CM

## 2025-03-12 DIAGNOSIS — M54.41 ACUTE BILATERAL LOW BACK PAIN WITH BILATERAL SCIATICA: ICD-10-CM

## 2025-03-12 DIAGNOSIS — I10 ESSENTIAL HYPERTENSION: ICD-10-CM

## 2025-03-12 DIAGNOSIS — G62.9 NEUROPATHY: ICD-10-CM

## 2025-03-12 DIAGNOSIS — E78.49 OTHER HYPERLIPIDEMIA: ICD-10-CM

## 2025-03-12 PROCEDURE — G0439 PPPS, SUBSEQ VISIT: HCPCS | Performed by: NURSE PRACTITIONER

## 2025-03-12 PROCEDURE — G0444 DEPRESSION SCREEN ANNUAL: HCPCS | Performed by: NURSE PRACTITIONER

## 2025-03-12 PROCEDURE — 99214 OFFICE O/P EST MOD 30 MIN: CPT | Performed by: NURSE PRACTITIONER

## 2025-03-12 PROCEDURE — G2211 COMPLEX E/M VISIT ADD ON: HCPCS | Performed by: NURSE PRACTITIONER

## 2025-03-12 RX ORDER — MELOXICAM 15 MG/1
15 TABLET ORAL DAILY PRN
Qty: 30 TABLET | Refills: 0 | Status: SHIPPED | OUTPATIENT
Start: 2025-03-12 | End: 2025-03-12

## 2025-03-12 RX ORDER — MELOXICAM 15 MG/1
15 TABLET ORAL DAILY PRN
Qty: 30 TABLET | Refills: 0 | Status: SHIPPED | OUTPATIENT
Start: 2025-03-12

## 2025-03-12 RX ORDER — GABAPENTIN 600 MG/1
600 TABLET ORAL 3 TIMES DAILY
Qty: 270 TABLET | Refills: 0 | Status: SHIPPED | OUTPATIENT
Start: 2025-03-12

## 2025-03-12 NOTE — PATIENT INSTRUCTIONS
Patient Education     Core Strengthening Exercises on Back or on Hands and Knees   About this topic   Your core muscles are in your chest, back, buttock, and stomach area. They are your abdominal, back, and pelvis muscles. These muscles help keep your body stable when using your arms or legs. They also help with balance and posture. There are many exercises you can do to keep these muscles strong.  If you have back problems like a compression fracture or a ruptured disc, doing some of these exercises could make your problem worse. Some of these exercises may cause lower back pain.  General   Before starting with a program, ask your doctor if you are healthy enough to do these exercises. Your doctor may have you work with a , chiropractor, or physical therapist to make a safe exercise program to meet your needs.  Strengthening Exercises   Strengthening exercises keep your muscles firm and strong. Start by repeating each exercise 2 to 3 times. Work up to doing each exercise 10 times. Try to do the exercises 2 to 3 times each day. Hold each exercise for 3 to 5 seconds. Do all exercises slowly.  Hip lifts ? Lie on your back with your knees bent and feet flat on the floor. Tighten your stomach muscles and push your heels into the floor to lift your buttocks off the floor. Relax.  Pelvic tilts ? Lie on your back with your knees bent and feet flat on the floor. Tighten your stomach muscles and press your lower back down to the floor. Relax.  Straight leg raises lying down ? Lie on your back with one leg straight. Bend your other knee so the foot is flat on the bed. Keeping your leg straight, lift the leg up to the level of your other knee. Lower it back down. Repeat with the other leg.  Knee flex lying down ? Lie on your back with both knees bent and your feet flat on the floor. Tighten your belly muscles. Raise one leg up and back down as if you are marching in slow motion. Keep belly muscles tight while you move  your leg. Switch legs. To make this exercise harder, raise both arms straight up in the air. Tighten your belly muscles. When you raise one leg up, reach the opposite arm over your head. Switch, moving the opposite arm and leg until you have done 10 repetitions on each side.  Abdominal crunches ? Lie on your back with both knees bent. Keep your feet flat on the floor. Place your hands in one of these positions. Try starting with the first position since it is the easiest. As you get better, use the other positions to make it harder.  Crunches with arms at sides.  Crunches with arms across chest.  Crunches with arms behind head. Be careful not to interlock your fingers behind your neck or head while doing crunches. This may add tension to your neck and cause strain.  Look at the ceiling. Tighten your belly muscles and lift your shoulders and upper back off the floor. Breathe out while you are doing this. Lower your shoulders to the floor. Breathe in while you are doing this. Relax your belly muscles all the way before starting another crunch.  Arm and leg lifts on hands and knees ? Start on your hands and knees. With all of these exercises, keep your back as level as possible. If you are having trouble with this, you may want to put a small object on your back such as a book. If it falls off, you are not keeping your back level enough during the exercise.  Lift one arm up to shoulder level and hold. Lower it back down. Now, lift up the other arm and hold.  Lift one leg up and kick it straight out until it is in line with your back and hold. Lower it back down. Now, lift up the other leg and hold.  Lift one arm and the OPPOSITE leg up at the same time and hold. Lower them down. Now, repeat using the other arm and leg. This is a very hard exercise. It may take time to be able to do this.               What will the results be?   Stronger core  Better balance  More toned belly and back muscles  Easier to do daily  activities  Better sports performance  Less low back pain  Helpful tips   Stay active and work out to keep your muscles strong and flexible.  Keep a healthy weight to avoid putting too much stress on your spine. Eat a healthy diet to keep your muscles healthy.  Be sure you do not hold your breath when exercising. This can raise your blood pressure. If you tend to hold your breath, try counting out loud when exercising. If any exercise bothers you, stop right away.  Try walking or cycling at an easy pace for a few minutes to warm up your muscles. Do this again after exercising.  Exercise may be slightly uncomfortable, but you should not have sharp pains. If you do get sharp pains, stop what you are doing. If the sharp pains continue, call your doctor.  Last Reviewed Date   2021-03-18  Consumer Information Use and Disclaimer   This generalized information is a limited summary of diagnosis, treatment, and/or medication information. It is not meant to be comprehensive and should be used as a tool to help the user understand and/or assess potential diagnostic and treatment options. It does NOT include all information about conditions, treatments, medications, side effects, or risks that may apply to a specific patient. It is not intended to be medical advice or a substitute for the medical advice, diagnosis, or treatment of a health care provider based on the health care provider's examination and assessment of a patient’s specific and unique circumstances. Patients must speak with a health care provider for complete information about their health, medical questions, and treatment options, including any risks or benefits regarding use of medications. This information does not endorse any treatments or medications as safe, effective, or approved for treating a specific patient. [employer] and its affiliates disclaim any warranty or liability relating to this information or the use thereof. The use of this information  "is governed by the Terms of Use, available at https://www.[employer].com/en/know/clinical-effectiveness-terms   Copyright   Copyright © 2024 [employer] and its affiliates and/or licensors. All rights reserved.  Patient Education     Back exercises   The Basics   Written by the doctors and editors at [employer]   Why do I need to do back exercises? -- Back exercises can help ease back pain and might help prevent future back pain. Long term, it is important to strengthen the muscles in your lower back, buttocks, and belly. These are your \"core muscles.\" Stretching exercises are also important to keep your muscles flexible.  Below are some stretching and strengthening exercises that might help you. Other forms of movement can help ease or prevent back pain, too. For example, some people like to walk, do aerobic exercise, or do yoga or tracy chi. The most important thing is to move your body. Your doctor, nurse, or physical therapist can help you find different types of activity that work for you.  What stretching exercises should I do? -- Below are some examples of stretching exercises. Warm up your muscles before stretching to help prevent injury. To warm up, you can walk, jog, or cycle for a few minutes.  Start by repeating each of these stretches 2 to 3 times. Try to hold each stretch for 5 to 10 seconds, and try to do the stretches 2 to 3 times each day. Breathe slowly and deeply as you do the exercises. Never bounce when doing stretches.   Cat-cow stretch (figure 1) - Start on all fours on the floor, with your hands under your shoulders, knees under your hips, and your back flat. First, tuck your chin and tighten your stomach muscles as you round your back (like a \"cat\"). Hold the stretch for about 10 seconds. Rest for a few seconds as you flatten your back. Next, lift your chin and let your belly and lower back sink toward the floor (like a \"cow\"). Hold the stretch for about 10 seconds.   Single knee-to-chest " stretches (figure 2) ? While lying on your back, bend your knees with your feet flat on the floor. Pull 1 knee toward your chest until you feel a stretch in your lower back and buttock area. Lower, and repeat with the other knee. If you have knee problems, pull your knee up by grabbing the back of your thigh instead of the front of your knee. You can also do this exercise by grabbing both knees at the same time.   Lower trunk rotations (figure 3) ? While lying on your back, bend your knees with your feet flat on the floor. Keep your knees and ankles together, and then drop them to 1 side. Keep both of your shoulders touching the floor until you feel a stretch in the muscles at the side of the back. Repeat on the other side.   Lower back stretches seated (figure 4) ? Sit in a chair with your feet spread about shoulder width apart. Then, lean forward until you feel a stretch in your lower back.  What strengthening exercises should I do? -- Below are some examples of strengthening exercises.  Start by doing each exercise 2 to 3 times. Work up to doing each exercise 10 times. Hold each exercise for 3 to 5 seconds, and try to do the exercises 2 to 3 times each day. Do all exercises slowly.   Shoulder blade squeezes (figure 5) ? Pinch your shoulder blades together on your upper back, and hold 3 to 5 seconds. You can also do these 1 side at a time. Sit with good posture, and make sure that your shoulders do not rise up when you do this exercise. Relax.   Pelvic tilts (figure 6) ? Lie on your back with your knees bent and feet flat on the floor. Tighten your stomach muscles, and press your lower back down to the floor. Relax. You should be able to breathe comfortably during this exercise.   Hip lifts (figure 7) ? Lie on your back with your knees bent and feet flat on the floor. Tighten your stomach muscles, keep your back flat, and lift your buttocks off of the floor. Relax. You should feel this in your buttocks, not in  "your lower back.  What else should I know?    Exercise, including stretching, might be slightly uncomfortable. But you should not have sharp or severe pain. If you do get severe pain, stop what you are doing. If severe pain continues, call your doctor or nurse.   Do not hold your breath when exercising. If you tend to hold your breath, try counting out loud when exercising. If any exercise bothers you, stop right away.   Always warm up before exercising. Warm muscles stretch much easier than cool muscles. Stretching cool muscles can lead to injury.   Doing exercises before a meal can be a good way to get into a routine.  All topics are updated as new evidence becomes available and our peer review process is complete.  This topic retrieved from [employer] on: Apr 03, 2024.  Topic 810972 Version 2.0  Release: 32.2.4 - C32.92  © 2024 UpToDate, Inc. and/or its affiliates. All rights reserved.  figure 1: Cat-cow stretch     Start on all fours on the floor, with hands under your shoulders, knees under your hips, and your back flat. First, tuck your chin and tighten your stomach muscles as you round your back (like a \"cat\"). Hold the stretch for about 10 seconds. Rest for a few seconds as you flatten your back. Next, lift your chin and let your belly and lower back sink toward the floor (like a \"cow\"). Hold the stretch for about 10 seconds.  Graphic [id number] Version 1.0  figure 2: Single knee-to-chest stretch     Lie on your back, bend your knees, and have your feet flat on the floor. Pull 1 knee toward your chest until you feel a stretch in your lower back and buttock area. Repeat with the other knee. If you have knee problems, pull your knee up by grabbing the back of your thigh instead of the front of your knee. You can also do this exercise by grabbing both knees at the same time.  Graphic [id number] Version 1.0  figure 3: Lower trunk rotation     While lying on your back, bend your knees and have your feet flat on the floor. " Keep your legs together and then drop them to 1 side. Keep both of your shoulders touching the floor until you feel a stretch in the muscles at the side of the back. Repeat on the other side.  Graphic [id number] Version 1.0  figure 4: Lower back stretch     Sit in a chair with your feet spread about shoulder width apart. Then, lean forward until you feel a stretch in your lower back.  Graphic [id number] Version 1.0  figure 5: Shoulder blade squeezes     Pinch your shoulder blades together on your upper back and hold for 3 to 5 seconds. Make sure that you are sitting with good posture and that your shoulders do not raise up when you do this exercise. Relax.  Graphic [id number] Version 1.0  figure 6: Pelvic tilts     Lie on your back with your knees bent and feet flat on the floor. Tighten your stomach muscles and press your lower back down to the floor. Relax.  Graphic [id number] Version 1.0  figure 7: Hip lifts     Lie on your back with your knees bent and feet flat on the floor. Tighten your stomach muscles and lift your buttocks off of the floor. Relax.  Graphic 139165 Version 1.0  Consumer Information Use and Disclaimer   Disclaimer: This generalized information is a limited summary of diagnosis, treatment, and/or medication information. It is not meant to be comprehensive and should be used as a tool to help the user understand and/or assess potential diagnostic and treatment options. It does NOT include all information about conditions, treatments, medications, side effects, or risks that may apply to a specific patient. It is not intended to be medical advice or a substitute for the medical advice, diagnosis, or treatment of a health care provider based on the health care provider's examination and assessment of a patient's specific and unique circumstances. Patients must speak with a health care provider for complete information about their health, medical questions, and treatment options, including any risks or benefits  regarding use of medications. This information does not endorse any treatments or medications as safe, effective, or approved for treating a specific patient. Up[employer], Inc[employer] and its affiliates disclaim any warranty or liability relating to this information or the use thereof.The use of this information is governed by the Terms of Use, available at https://www.[employer].com/en/know/clinical-effectiveness-terms. 2024© Up[employer]Date, Inc. and its affiliates and/or licensors. All rights reserved.  Copyright   © 2024 Up[employer]Date, Inc. and/or its affiliates. All rights reserved.

## 2025-03-12 NOTE — ASSESSMENT & PLAN NOTE
Lab Results   Component Value Date    EGFR 93 02/17/2025    EGFR 85 07/25/2024    EGFR 73 12/15/2023    CREATININE 0.7 02/17/2025    CREATININE 0.76 07/25/2024    CREATININE 0.86 12/15/2023   - Normal renal function.   - Will continue to monitor.

## 2025-03-12 NOTE — ASSESSMENT & PLAN NOTE
- Continue Crestor 40 mg daily.   - Encourage low fat diet and regular exercise.  - Due for updated fasting lipid panel.

## 2025-03-12 NOTE — ASSESSMENT & PLAN NOTE
- Not well controlled.   - She currently takes metformin 750 mg BID, Trulicity 4.5 mg weekly, Novolog 10 units three times daily with meals, and Lantus 40 units at bedtime.   - Encourage low carb diet and regular exercise.   - She has follow up with her Endocrinologist on 3/24.   Lab Results   Component Value Date    HGBA1C 10.3 (H) 02/17/2025

## 2025-03-12 NOTE — ASSESSMENT & PLAN NOTE
- Not well controlled.  - Advised against ordering prednisone given her elevated blood sugars and A1c.   - Will start patient on meloxicam 15 mg daily as needed.   - Will also increase her gabapentin to 600 mg TID. Discussed this will help her baseline neuropathy as well as help her back pain.   -  Discussed side effects of medications.   - Exercises provided in After Visit Summary.  - Consider referral to [facility] if no improvement.   Orders:  •  meloxicam (MOBIC) 15 mg tablet; Take 1 tablet (15 mg total) by mouth daily as needed for moderate pain

## 2025-03-12 NOTE — PROGRESS NOTES
Name: Virgie Lieberman      : 1955      MRN: [id number]  Encounter Provider: HAROLDO Awan  Encounter Date: 3/12/2025   Encounter department: [facility]    Assessment & Plan  Type 2 diabetes mellitus with polyneuropathy (HCC)  - Not well controlled.   - She currently takes metformin 750 mg BID, Trulicity 4.5 mg weekly, Novolog 10 units three times daily with meals, and Lantus 40 units at bedtime.   - Encourage low carb diet and regular exercise.   - She has follow up with her Endocrinologist on 3/24.   Lab Results   Component Value Date    HGBA1C 10.3 (H) 2025          Stage 3a chronic kidney disease (HCC)  Lab Results   Component Value Date    EGFR 93 2025    EGFR 85 2024    EGFR 73 12/15/2023    CREATININE 0.7 2025    CREATININE 0.76 2024    CREATININE 0.86 12/15/2023   - Normal renal function.   - Will continue to monitor.        Essential hypertension  - Well controlled on Toprol XL 50 mg daily. Continue same.   - Will continue to monitor.        Other hyperlipidemia  - Continue Crestor 40 mg daily.   - Encourage low fat diet and regular exercise.  - Due for updated fasting lipid panel.        Neuropathy  - Not well controlled.  - Will increase gabapentin to 600 mg TID. Discussed side effects.  - Will continue to monitor.  Orders:  •  gabapentin (Neurontin) 600 MG tablet; Take 1 tablet (600 mg total) by mouth 3 (three) times a day    Acute bilateral low back pain with bilateral sciatica  - Not well controlled.  - Advised against ordering prednisone given her elevated blood sugars and A1c.   - Will start patient on meloxicam 15 mg daily as needed.   - Will also increase her gabapentin to 600 mg TID. Discussed this will help her baseline neuropathy as well as help her back pain.   -  Discussed side effects of medications.   - Exercises provided in After Visit Summary.  - Consider referral to Spine and Pain Management if no improvement.    Orders:  •  meloxicam (MOBIC) 15 mg tablet; Take 1 tablet (15 mg total) by mouth daily as needed for moderate pain    Healthcare maintenance  - Discussed immunizations, screenings, healthy diet, exercise, and safety measures.  - Mammogram scheduled for 4/1.   - She is due for colonoscopy.          Depression Screening and Follow-up Plan: Patient was screened for depression during today's encounter. They screened negative with a PHQ-2 score of 0.      Urinary Incontinence Plan of Care: counseling topics discussed: practice Kegel (pelvic floor strengthening) exercises, use restroom every 2 hours, limit alcohol, caffeine, spicy foods, and acidic foods, limiting fluid intake 3-4 hours before bed and limiting fluid intake to 60 oz. per day.       Preventive health issues were discussed with patient, and age appropriate screening tests were ordered as noted in patient's After Visit Summary. Personalized health advice and appropriate referrals for health education or preventive services given if needed, as noted in patient's After Visit Summary.    History of Present Illness     Patient with PMH of HTN, HLD, CKD, and type 2 diabetes presents to office today with complaints of lower back pain and sciatica. Has been occurring for the past 2 weeks. She has had this pain in the past and used Aleve but that is no longer working. She does endorse shooting pain down the back of her left leg. Also endorses numbness and tingling. Denies bowel or bladder dysfunction. She had blood work done recently which showed elevated hemoglobin A1c of 10.3. She sees her Endocrinologist on 3/24. She denies any other concerns or complaints today.            Patient Care Team:  Pramod Arora MD as PCP - General (Family Medicine)  Valdo Jama MD (Endocrinology)  Lauri Finnegan DO (Obstetrics and Gynecology)  Esteban Love MD (Ophthalmology)    Review of Systems   Constitutional:  Negative for fatigue and fever.   HENT:  Negative for  congestion, rhinorrhea and trouble swallowing.    Eyes:  Negative for visual disturbance.   Respiratory:  Negative for cough and shortness of breath.    Cardiovascular:  Negative for chest pain and palpitations.   Gastrointestinal:  Negative for abdominal pain and blood in stool.   Endocrine: Negative for cold intolerance and heat intolerance.   Genitourinary:  Negative for difficulty urinating, dysuria and frequency.   Musculoskeletal:  Positive for back pain. Negative for gait problem.   Skin:  Negative for rash.   Neurological:  Negative for dizziness, syncope and headaches.   Hematological:  Negative for adenopathy.   Psychiatric/Behavioral:  Negative for behavioral problems.      Medical History Reviewed by provider this encounter:  Tobacco  Allergies  Meds  Problems  Med Hx  Surg Hx  Fam Hx       Annual Wellness Visit Questionnaire   Virgie is here for her Subsequent Wellness visit. Last Medicare Wellness visit information reviewed, patient interviewed and updates made to the record today.      Health Risk Assessment:   Patient rates overall health as fair. Patient feels that their physical health rating is same. Patient is satisfied with their life. Eyesight was rated as slightly worse. Hearing was rated as slightly worse. Patient feels that their emotional and mental health rating is slightly worse. Patients states they are never, rarely angry. Patient states they are never, rarely unusually tired/fatigued. Pain experienced in the last 7 days has been a lot. Patient's pain rating has been 7/10. Patient states that she has experienced no weight loss or gain in last 6 months.     Depression Screening:   PHQ-2 Score: 0      Fall Risk Screening:   In the past year, patient has experienced: no history of falling in past year      Urinary Incontinence Screening:   Patient has not leaked urine accidently in the last six months.     Home Safety:  Patient does not have trouble with stairs inside or outside of  their home. Patient has working smoke alarms and has working carbon monoxide detector. Home safety hazards include: none.     Nutrition:   Current diet is Regular.     Medications:   Patient is currently taking over-the-counter supplements. OTC medications include: see medication list. Patient is able to manage medications.     Activities of Daily Living (ADLs)/Instrumental Activities of Daily Living (IADLs):   Walk and transfer into and out of bed and chair?: Yes  Dress and groom yourself?: Yes    Bathe or shower yourself?: Yes    Feed yourself? Yes  Do your laundry/housekeeping?: Yes  Manage your money, pay your bills and track your expenses?: Yes  Make your own meals?: Yes    Do your own shopping?: Yes    Previous Hospitalizations:   Any hospitalizations or ED visits within the last 12 months?: No      Advance Care Planning:   Living will: Yes    Durable POA for healthcare: Yes    Advanced directive: Yes      Cognitive Screening:   Provider or family/friend/caregiver concerned regarding cognition?: No    PREVENTIVE SCREENINGS      Cardiovascular Screening:    General: Screening Not Indicated and History Lipid Disorder      Diabetes Screening:     General: Screening Not Indicated and History Diabetes      Colorectal Cancer Screening:     General: Risks and Benefits Discussed    Due for: Colonoscopy - Low Risk      Breast Cancer Screening:     General: Risks and Benefits Discussed    Due for: Mammogram        Cervical Cancer Screening:    General: Screening Not Indicated      Osteoporosis Screening:    General: Screening Not Indicated and History Osteoporosis      Abdominal Aortic Aneurysm (AAA) Screening:        General: Screening Not Indicated      Lung Cancer Screening:     General: Screening Not Indicated      Hepatitis C Screening:    General: Screening Current    Screening, Brief Intervention, and Referral to Treatment (SBIRT)     Screening  Typical number of drinks in a day: 0  Typical number of drinks in a  "week: 0  Interpretation: Low risk drinking behavior.    Brief Intervention  Alcohol & drug use screenings were reviewed. No concerns regarding substance use disorder identified.     Annual Depression Screening  Time spent screening and evaluating the patient for depression during today's encounter was 5 minutes.    Other Counseling Topics:   Regular weightbearing exercise and calcium and vitamin D intake.     Social Drivers of Health     Financial Resource Strain: Low Risk  (12/14/2023)    Overall Financial Resource Strain (CARDIA)    • Difficulty of Paying Living Expenses: Not hard at all   Transportation Needs: No Transportation Needs (12/14/2023)    PRAPARE - Transportation    • Lack of Transportation (Medical): No    • Lack of Transportation (Non-Medical): No     No results found.    Objective   /68 (BP Location: Left arm, Patient Position: Sitting, Cuff Size: Large)   Pulse 77   Temp 98.1 °F (36.7 °C) (Tympanic)   Resp 16   Ht 4' 11\" (1.499 m)   Wt 67.8 kg (149 lb 6.4 oz)   SpO2 97%   BMI 30.18 kg/m²     Physical Exam  Vitals and nursing note reviewed.   Constitutional:       General: She is not in acute distress.     Appearance: Normal appearance. She is well-developed.   HENT:      Head: Normocephalic and atraumatic.      Right Ear: Tympanic membrane, ear canal and external ear normal.      Left Ear: Tympanic membrane, ear canal and external ear normal.      Nose: Nose normal.      Mouth/Throat:      Mouth: Mucous membranes are moist.   Eyes:      Conjunctiva/sclera: Conjunctivae normal.      Pupils: Pupils are equal, round, and reactive to light.   Cardiovascular:      Rate and Rhythm: Normal rate and regular rhythm.      Heart sounds: Normal heart sounds.   Pulmonary:      Effort: Pulmonary effort is normal.      Breath sounds: Normal breath sounds.   Abdominal:      General: Bowel sounds are normal.      Palpations: Abdomen is soft.   Musculoskeletal:         General: Normal range of motion. "      Cervical back: Normal range of motion.   Lymphadenopathy:      Cervical: No cervical adenopathy.   Skin:     General: Skin is warm and dry.   Neurological:      Mental Status: She is alert and oriented to person, place, and time.   Psychiatric:         Mood and Affect: Mood normal.         Behavior: Behavior normal.

## 2025-03-12 NOTE — ASSESSMENT & PLAN NOTE
- Not well controlled.  - Will increase gabapentin to 600 mg TID. Discussed side effects.  - Will continue to monitor.  Orders:  •  gabapentin (Neurontin) 600 MG tablet; Take 1 tablet (600 mg total) by mouth 3 (three) times a day

## 2025-03-12 NOTE — ASSESSMENT & PLAN NOTE
- Discussed immunizations, screenings, healthy diet, exercise, and safety measures.  - Mammogram scheduled for 4/1.   - She is due for colonoscopy.

## 2025-03-28 ENCOUNTER — VBI (OUTPATIENT)
Dept: ADMINISTRATIVE | Facility: OTHER | Age: 70
End: 2025-03-28

## 2025-03-28 NOTE — TELEPHONE ENCOUNTER
03/28/25 12:20 PM     Chart reviewed for Mammogram was/were not submitted to the patient's insurance.     Placido Richey MA   [facility]

## 2025-04-09 ENCOUNTER — TELEPHONE (OUTPATIENT)
Age: 70
End: 2025-04-09

## 2025-04-09 DIAGNOSIS — M54.42 ACUTE BILATERAL LOW BACK PAIN WITH BILATERAL SCIATICA: Primary | ICD-10-CM

## 2025-04-09 DIAGNOSIS — M54.41 ACUTE BILATERAL LOW BACK PAIN WITH BILATERAL SCIATICA: Primary | ICD-10-CM

## 2025-04-09 NOTE — TELEPHONE ENCOUNTER
Pt last seen 3/12/25. Currently takes 600mg TID of Gabapentin but states it is only helping her a little. Also stating that her Meloxicam 15mg is not helping her sciatic or leg pain. She is asking advise.

## 2025-04-09 NOTE — TELEPHONE ENCOUNTER
Bedside and Verbal shift change report given to Esteban Briceño RN (oncoming nurse) by David Gonsalves'SORAYA'Brina Newman RN (offgoing nurse). Report included the following information SBAR, Kardex, ED Summary, Procedure Summary, Intake/Output, MAR, Recent Results, Med Rec Status, Cardiac Rhythm (ST) and Alarm Parameters . 2000- During assessment patient repeatedly tries to exit bed, pulling IV tubing and crying. Patient reoriented. Sitter at bedside. 2030-Admission data contained from Jodee Steen, full time caregiver and guardian. Mrs. Amelie Pierre cell phone number is [phone]. She also provided patient outpatient  Name: Jose Luis  and states \"He will be coming to check on patient. \" Informed Mrs. Amelie Pierre that consent has to be obtained in order from Mr. Jordon Dunlap to receive any information reguarding patient. Mr. Amelie Pierre states that she will discuss with Mr. Jordon Dunlap what information he needs and obtain consent tomorrow. 2130--Patient tries to exit bed, pull IV tubing and EKG monitor cables off. Nurse, sitter and Aunt at bedside. Pt. difficult to reorient. Order obtained for roll belt. Consent obtained from aunt. 2310--Patient awakens started to threw covers on the floor, pull EKG cables off and begins to attempt to exit bed. Attempts to calm and reorient patient unsuccessful. Pt. threw urinal across room, begin cursing at staff. Ativan 1mg IV given. 0000--Patient resting quietly with eyes closed and sitter at bedside. 0400--Patient alert, oriented and calm. Patient currently eating Jello and watching TV. Sitter remains at bedside. 0715--Bedside and Verbal shift change report given to Andressa Messina (oncoming nurse) by Esteban Briceño RN (offgoing nurse). Report included the following information SBAR, Kardex, ED Summary, Intake/Output, MAR, Recent Results, Med Rec Status and Cardiac Rhythm (SR/ST). Last visit 03/12/2025  No upcoming appt    Patient stated that the gabapentin only helps a little bit but that meloxicam is not helping at all with her sciatic pain on the back of both legs. Patient would like to speak with HAROLDO Torres. Please contact patient at [phone]. Please advise.    Detail Level: Zone Otc Regimen: Amlactin cream for arms and CeraVe cream several times a day on arms. Plan: No hot showers, warm showers only.

## 2025-04-11 PROBLEM — Z00.00 HEALTHCARE MAINTENANCE: Status: RESOLVED | Noted: 2025-03-12 | Resolved: 2025-04-11

## 2025-04-12 ENCOUNTER — TELEPHONE (OUTPATIENT)
Dept: OTHER | Facility: OTHER | Age: 70
End: 2025-04-12

## 2025-04-12 NOTE — TELEPHONE ENCOUNTER
Pt reached answering service, she received text to schedule, she is usually available for appts after 3:30.

## 2025-04-14 ENCOUNTER — VBI (OUTPATIENT)
Dept: ADMINISTRATIVE | Facility: OTHER | Age: 70
End: 2025-04-14

## 2025-04-14 NOTE — TELEPHONE ENCOUNTER
04/14/25 3:53 PM     Chart reviewed for Diabetic Eye Exam was/were not submitted to the patient's insurance.     Placido Richey MA   [facility]

## 2025-04-23 ENCOUNTER — CONSULT (OUTPATIENT)
Dept: PAIN MEDICINE | Facility: CLINIC | Age: 70
End: 2025-04-23
Attending: NURSE PRACTITIONER
Payer: COMMERCIAL

## 2025-04-23 VITALS — BODY MASS INDEX: 30.04 KG/M2 | HEIGHT: 59 IN | WEIGHT: 149 LBS

## 2025-04-23 DIAGNOSIS — M54.41 ACUTE BILATERAL LOW BACK PAIN WITH BILATERAL SCIATICA: Primary | ICD-10-CM

## 2025-04-23 DIAGNOSIS — M54.42 ACUTE BILATERAL LOW BACK PAIN WITH BILATERAL SCIATICA: Primary | ICD-10-CM

## 2025-04-23 PROCEDURE — 99204 OFFICE O/P NEW MOD 45 MIN: CPT | Performed by: ANESTHESIOLOGY

## 2025-04-23 PROCEDURE — G2211 COMPLEX E/M VISIT ADD ON: HCPCS | Performed by: ANESTHESIOLOGY

## 2025-04-23 RX ORDER — CYCLOBENZAPRINE HCL 5 MG
5 TABLET ORAL 3 TIMES DAILY PRN
Qty: 90 TABLET | Refills: 0 | Status: SHIPPED | OUTPATIENT
Start: 2025-04-23

## 2025-04-23 NOTE — PROGRESS NOTES
Assessment  1. Acute bilateral low back pain with bilateral sciatica      Plan    Patient presenting with chronic back pain for greater than 1 year, worsening over the past several months. Pain is consistent with lumbar radicular pain accompanied by consistent moderate to severe pain on the pain scale (5+/10) with inability to participate in IADLs for >6 weeks.  Patient has tried gabapentin, meloxicam with limited benefit.    Denies any bowel or bladder incontinence, saddle anesthesia.    In regards to the patient's pathology, we discussed the various treatment options including physical therapy, chiropractic treatment, medication management, activity modifications, interventional spine procedures.      Plan:    Patient's A1c is significant elevated at >10.0. She does follow with endocrinology.    Patient does not wish to consider interventional modalities either way, thus will refer her to PT at this time.    Start tizanidine trial in conjunction with her gabapentin and Mobic.    Can follow up as needed after PT - discussed I would not have much more to offer if she does not with to consider interventional options - and further her A1c would have to be optimized.    Reviewed PCP, endocrinology notes.    Reviewed renal function, CBC prior to recommending, initiating, continuing and/or adjusting medications.    Reviewed hemoglobin A1c, renal function, CBC and/or PT/INR prior to discussing/offering interventional modalities.    My impressions and treatment recommendations were discussed in detail with the patient who verbalized understanding and had no further questions.  Discharge instructions were provided. I personally saw and examined the patient and I agree with the above discussed plan of care.    Orders Placed This Encounter   Procedures    Ambulatory referral to Physical Therapy     Standing Status:   Future     Expiration Date:   4/23/2026     Referral Priority:   Routine     Referral Type:   Physical Therapy      Referral Reason:   Specialty Services Required     Requested Specialty:   Physical Therapy     Number of Visits Requested:   1     Expiration Date:   4/23/2026     New Medications Ordered This Visit   Medications    cyclobenzaprine (FLEXERIL) 5 mg tablet     Sig: Take 1 tablet (5 mg total) by mouth 3 (three) times a day as needed for muscle spasms     Dispense:  90 tablet     Refill:  0       History of Present Illness    Virgie Lieberman is a 69 y.o. female presenting for consultation at [facility] for exam and evaluation of worsening back pain for greater than 1 year, worsening over the past several months. Pain started without any precipitating injury or trauma. Over the past month, the intensity of pain has been Severe. Pain is currently 10/10. Pain does interfere with age appropriate activities of daily living. Pain is nearly constant, described as cramping, sharp, shooting. Patient endorses weakness in the lower extremities. Assistance device used: None.    Worsening factors noted: standing, bending, sitting, walking, coughing, sneezing.   Improving factors noted: n/a.    Treatments tried:   Heat/ice: yes  PT: no  Chiropractic therapy: no  Injections: no   Previous spine surgery: No    Anticoagulation: no    Medications tried:   Tylenol, NSAIDs, gabapentin    I have personally reviewed and/or updated the patient's past medical history, past surgical history, family history, social history, current medications, allergies, and vital signs today.     Review of Systems    Patient Active Problem List   Diagnosis    Low vitamin D level    Osteoporosis without current pathological fracture    Immunization due    Type 2 diabetes mellitus with polyneuropathy (HCC)    Stage 3a chronic kidney disease (HCC)    Blocked ear, left    ETD (Eustachian tube dysfunction), bilateral    Post-nasal drainage    Other hyperlipidemia    Essential hypertension    Neuropathy    Acute bilateral low back pain  with bilateral sciatica       Past Medical History:   Diagnosis Date    Absent pulse     DP pulse B/L    Depression     Diabetes (HCC)     HLD (hyperlipidemia)     Neuropathy     Osteopenia     Prominence of large joints     (L) SC    Weight loss        Past Surgical History:   Procedure Laterality Date    EYE SURGERY  2018    TRIGGER FINGER RELEASE Left 03/12/2021       Family History   Problem Relation Age of Onset    Alzheimer's disease Mother     Heart attack Father     Diabetes Sister     No Known Problems Daughter     No Known Problems Maternal Grandmother     No Known Problems Paternal Grandmother     No Known Problems Sister     No Known Problems Sister     No Known Problems Sister     No Known Problems Sister     No Known Problems Sister     No Known Problems Sister     No Known Problems Daughter        Social History     Occupational History    Occupation: "[employer]"   Tobacco Use    Smoking status: Every Day     Current packs/day: 0.50     Average packs/day: 0.5 packs/day for 20.0 years (10.0 ttl pk-yrs)     Types: Cigarettes    Smokeless tobacco: Never   Vaping Use    Vaping status: Never Used   Substance and Sexual Activity    Alcohol use: Not Currently    Drug use: Never    Sexual activity: Not Currently     Birth control/protection: Post-menopausal       Current Outpatient Medications on File Prior to Visit   Medication Sig    acetaminophen (TYLENOL) 500 mg tablet every 8 (eight) hours    amlodipine-olmesartan (BRIANNA) 10-40 MG Take 1 tablet by mouth every evening    BD Pen Needle Veda 2nd Gen 32G X 4 MM MISC 1 STICK BY MISCELLANEOUS ROUTE 4 TIMES DAILY.    benzonatate (TESSALON PERLES) 100 mg capsule Take 1 capsule (100 mg total) by mouth 3 (three) times a day as needed for cough (Patient not taking: Reported on 10/4/2024)    Continuous Blood Gluc Sensor (FreeStyle Maddy Sensor System) MISC FreeStyle Maddy 2 Sensor kit    Difluprednate (Durezol) 0.05 % EMUL  (Patient not taking: Reported on  "2/20/2024)    ergocalciferol (VITAMIN D2) 50,000 units Take 50,000 Units by mouth    ezetimibe (ZETIA) 10 mg tablet Take 10 mg by mouth daily    fluticasone (FLONASE) 50 mcg/act nasal spray 1 spray into each nostril daily    gabapentin (Neurontin) 600 MG tablet Take 1 tablet (600 mg total) by mouth 3 (three) times a day    guaiFENesin (MUCINEX) 600 mg 12 hr tablet Take 1 tablet (600 mg total) by mouth every 12 (twelve) hours    ibandronate (BONIVA) 150 MG tablet Take 1 tablet (150 mg total) by mouth every 30 (thirty) days (Patient not taking: Reported on 10/4/2024)    insulin aspart (NOVOLOG FLEXPEN) 100 Units/mL injection pen Inject 10 Units under the skin 3 (three) times a day with meals for 30 days    Lantus SoloStar 100 units/mL SOPN Inject 40 units sc qhs    loratadine (CLARITIN) 10 mg tablet Take 1 tablet (10 mg total) by mouth daily    meloxicam (MOBIC) 15 mg tablet Take 1 tablet (15 mg total) by mouth daily as needed for moderate pain    metFORMIN (GLUCOPHAGE-XR) 750 mg 24 hr tablet Take 750 mg by mouth 2 (two) times a day    metoprolol succinate (TOPROL-XL) 50 mg 24 hr tablet Take 50 mg by mouth daily    omega-3-acid ethyl esters (LOVAZA) 1 g capsule Take 2 g by mouth 2 (two) times a day      omeprazole (PriLOSEC) 20 mg delayed release capsule TAKE 1 CAPSULE BY MOUTH EVERY DAY    OYSCO 500 + D 500-5 MG-MCG TABS Take 1 tablet by mouth 2 (two) times a day with meals    rosuvastatin (CRESTOR) 40 MG tablet TAKE 1 TABLET BY MOUTH EVERY DAY AT NIGHT    Trulicity 4.5 MG/0.5ML injection INJECT 4.5 MG SUBCUTANEOUSLY EVERY 7 DAYS    UltiCare Insulin Syringe 31G X 5/16\" 0.3 ML MISC      No current facility-administered medications on file prior to visit.       Allergies   Allergen Reactions    Oxycodone Other (See Comments)     fatigue       Physical Exam    Ht 4' 11\" (1.499 m)   Wt 67.6 kg (149 lb)   BMI 30.09 kg/m²     Constitutional: normal, well developed, well nourished, alert, in no distress and non-toxic and " no overt pain behavior.  Eyes: anicteric  HEENT: grossly intact  Neck: supple, symmetric, trachea midline and no masses   Pulmonary:even and unlabored  Cardiovascular:No edema or pitting edema present  Skin:Normal without rashes or lesions and well hydrated  Psychiatric:Mood and affect appropriate  Neurologic: Motor function is grossly intact with no focal neurologic deficits   Musculoskeletal: Limited lumbar spine ROM    Imaging

## 2025-05-05 ENCOUNTER — EVALUATION (OUTPATIENT)
Dept: PHYSICAL THERAPY | Facility: MEDICAL CENTER | Age: 70
End: 2025-05-05
Attending: ANESTHESIOLOGY
Payer: COMMERCIAL

## 2025-05-05 DIAGNOSIS — M54.42 ACUTE BILATERAL LOW BACK PAIN WITH BILATERAL SCIATICA: Primary | ICD-10-CM

## 2025-05-05 DIAGNOSIS — M54.41 ACUTE BILATERAL LOW BACK PAIN WITH BILATERAL SCIATICA: Primary | ICD-10-CM

## 2025-05-05 PROCEDURE — 97112 NEUROMUSCULAR REEDUCATION: CPT

## 2025-05-05 PROCEDURE — 97162 PT EVAL MOD COMPLEX 30 MIN: CPT

## 2025-05-05 PROCEDURE — 97140 MANUAL THERAPY 1/> REGIONS: CPT

## 2025-05-05 NOTE — PROGRESS NOTES
PT Evaluation     Today's date: 2025  Patient name: Virgie Lieberman  : 1955  MRN: [id number]  Referring provider: William Rojas MD  Dx:   Encounter Diagnosis     ICD-10-CM    1. Acute bilateral low back pain with bilateral sciatica  M54.42 Ambulatory referral to Physical Therapy    M54.41                      Assessment  Impairments: abnormal muscle firing, abnormal muscle tone, abnormal or restricted ROM, abnormal movement, activity intolerance, impaired physical strength, lacks appropriate home exercise program and pain with function    Assessment details: Virgie Lieberman  is a pleasant 69 y.o. female who presents with low back pain.  The primary movement problem is L5-S1 radiculopathy resulting in limited ROM, strength deficit, hypertonic musculature of lumbar spine, and altered neurodynamics, which limit her ability to perform ADLs, IADLs and recreational activity.  No referral is necessary at this time based on examination results.   The patient's greatest concerns is not being able to stay active.     Problem List:  1) L5-S1 radiculopathy  2) altered neurodynamics  3) limited ROM    Etiologic factors include degenerative changes.  Pt. will benefit from skilled PT services that includes manual therapy techniques to enhance tissue extensibility, neuromuscular re-education to facilitate motor control, therapeutic exercise to increase functional mobility, and modalities prn to reduce pain and inflammation.  Understanding of Dx/Px/POC: good     Prognosis: good  Prognosis details: Positive prognostic indicators: motivation to improve   Negative prognostic indicators: chronicity of symptoms    Goals  Short Term Goals: to be achieved by 4 weeks  1) Patient to be independent with basic HEP.  2) Decrease pain to 3/10 at its worst.  3) Increase lumbar spine ROM by 10% in all deficient planes.   4) Increase LE strength by 1/2 MMT grade in all deficient planes.  5) Patient to report decreased sleep interruption  secondary to pain.  6) Increase seated to 30 min.    Long Term Goals: to be achieved by discharge  1) FOTO equal to or greater than 55.  2) Patient to be independent with comprehensive HEP.  3) Abolish pain for improved quality of life.  4) Lumbar spine ROM WNL all planes to improve a/iadls.  5) Increase LE strength to 5/5 MMT grade in all planes to improve a/iadls.  6) Patient to report no sleep interruption secondary to pain.  7) Increase seated to 60 min.      Plan  Patient would benefit from: skilled physical therapy  Planned modality interventions: low level laser therapy, TENS, cryotherapy and traction    Planned therapy interventions: joint mobilization, manual therapy, massage, neuromuscular re-education, patient education, postural training, strengthening, stretching, therapeutic activities, therapeutic exercise, flexibility, functional ROM exercises, graded exercise, home exercise program, IASTM, kinesiology taping, Mckee taping, balance and balance/weight bearing training    Treatment plan discussed with: patient  Plan details: Prognosis above is given PT services 2x/week tapering to 1x/week over the next 2 months and home program adherence.      Subjective Evaluation    History of Present Illness  Mechanism of injury: Virgie Lieberman presents with c/c of low back pain with paresthesias. Symptoms began over a year ago with mechanism of injury: insidious onset. She reports that the pain progressed to the pain she is having now. She noticed the pain severity about 3-4 months ago. This has happened to her before but it went away.   Aggravating factors: sitting too long, getting up from sitting, bending over, walking too long  Relieving factors: laying down on the right side  24hr pain pattern: 8/10 (current), 4/10 (best), 10/10 (worst), location: left glute down the leg, descriptors: sharp and shooting  Imaging: x-ray  Previous treatments: pain management- medication  Occupation/recreation: gardening,  cleaning, dressing, cooking  Primary concern: not being able to stay active  Patient goals: no pain      Objective     Postural Observations  Seated posture: fair  Standing posture: fair    Additional Postural Observation Details  Sits toward left    Palpation   Left   Hypertonic in the erector spinae.     Right   Hypertonic in the erector spinae.     Tenderness     Left Hip   Tenderness in the PSIS.     Active Range of Motion     Lumbar   Flexion:  with pain Restriction level: minimal  Extension:  with pain Restriction level: maximal  Left lateral flexion:  WFL  Right lateral flexion:  WFL  Left rotation:  with pain Restriction level: minimal  Right rotation:  with pain Restriction level: minimal    Joint Play   Joints within functional limits: L1, L2, L3 and L4     Hypomobile: L5 and S1     Pain: L5 and S1     Strength/Myotome Testing     Lumbar     Right   Normal strength    Left Hip   Planes of Motion   Flexion: 4  Abduction: 4  Adduction: 4    Left Knee   Flexion: 4  Extension: 4+    Tests     Lumbar     Left   Positive passive SLR and slump test.     Right   Positive passive SLR and slump test.     General Comments:    Lower quarter screen   Knees: unremarkable  Foot/ankle: unremarkable    Hip Comments   Limited PROM hip Ir b/l             Precautions: DM, osteopenia, kidney disease    HEP: self traction  Manuals 5/5            OVIDIO ZAMAN in sidelying                                                   Neuro Re-Ed                                                                                           HEP review and pt education 15'            Ther Ex             bike             LTR             Hip add             Hip abd             bridges             Nerve glides                                                                 Ther Activity                                       Gait Training                                       Modalities

## 2025-05-12 ENCOUNTER — APPOINTMENT (OUTPATIENT)
Dept: PHYSICAL THERAPY | Facility: MEDICAL CENTER | Age: 70
End: 2025-05-12
Attending: ANESTHESIOLOGY
Payer: COMMERCIAL

## 2025-05-14 ENCOUNTER — VBI (OUTPATIENT)
Dept: ADMINISTRATIVE | Facility: OTHER | Age: 70
End: 2025-05-14

## 2025-05-14 NOTE — TELEPHONE ENCOUNTER
05/14/25 12:45 PM     Chart reviewed for CRC: Colonoscopy was/were not submitted to the patient's insurance.     Placido Richey MA   [facility]   swing-through gait

## 2025-05-19 ENCOUNTER — OFFICE VISIT (OUTPATIENT)
Dept: PHYSICAL THERAPY | Facility: MEDICAL CENTER | Age: 70
End: 2025-05-19
Attending: ANESTHESIOLOGY
Payer: COMMERCIAL

## 2025-05-19 DIAGNOSIS — M54.42 ACUTE BILATERAL LOW BACK PAIN WITH BILATERAL SCIATICA: Primary | ICD-10-CM

## 2025-05-19 DIAGNOSIS — M54.41 ACUTE BILATERAL LOW BACK PAIN WITH BILATERAL SCIATICA: Primary | ICD-10-CM

## 2025-05-19 PROCEDURE — 97110 THERAPEUTIC EXERCISES: CPT

## 2025-05-19 NOTE — PROGRESS NOTES
Daily Note     Today's date: 2025  Patient name: Virgie Lieberman  : 1955  MRN: [id number]  Referring provider: William Rojas MD  Dx:   Encounter Diagnosis     ICD-10-CM    1. Acute bilateral low back pain with bilateral sciatica  M54.42     M54.41                      Subjective: Patient reports that she feels a little better.       Objective: See treatment diary below      Assessment: Tolerated treatment well. Patient demonstrated fatigue post treatment, exhibited good technique with therapeutic exercises, and would benefit from continued PT      Plan: Continue per plan of care.      Precautions: DM, osteopenia, kidney disease    HEP: self traction  Manuals             OVIDIO ZAMAN in sidelying                                                     Neuro Re-Ed                                                                                           HEP review and pt education             Ther Ex             bike 5'            LTR x20            Hip add x20            Hip abd X20 gtb            bridges x20            SKTC 10x10s            Piriformis stretch 3x30s            Nerve glides                                                                   Ther Activity                                         Gait Training                                       Modalities

## 2025-05-22 ENCOUNTER — DOCUMENTATION (OUTPATIENT)
Dept: ADMINISTRATIVE | Facility: OTHER | Age: 70
End: 2025-05-22

## 2025-05-22 NOTE — PROGRESS NOTES
05/22/25 8:17 AM    Annual Wellness Visit outreach is not required, an AWV was completed at the PCP office.    Thank you.  Tammy Ji MA  [facility]   Anterior

## 2025-05-23 ENCOUNTER — HOSPITAL ENCOUNTER (OUTPATIENT)
Dept: MAMMOGRAPHY | Facility: MEDICAL CENTER | Age: 70
Discharge: HOME | End: 2025-05-23
Payer: COMMERCIAL

## 2025-05-23 ENCOUNTER — HOSPITAL ENCOUNTER (OUTPATIENT)
Dept: BONE DENSITY | Facility: MEDICAL CENTER | Age: 70
Discharge: HOME | End: 2025-05-23
Payer: COMMERCIAL

## 2025-05-23 VITALS — BODY MASS INDEX: 30.04 KG/M2 | WEIGHT: 149 LBS | HEIGHT: 59 IN

## 2025-05-23 DIAGNOSIS — Z13.820 ENCOUNTER FOR SCREENING FOR OSTEOPOROSIS: ICD-10-CM

## 2025-05-23 DIAGNOSIS — Z12.31 ENCOUNTER FOR SCREENING MAMMOGRAM FOR MALIGNANT NEOPLASM OF BREAST: ICD-10-CM

## 2025-05-23 DIAGNOSIS — Z78.0 MENOPAUSE: ICD-10-CM

## 2025-05-23 PROCEDURE — 77063 BREAST TOMOSYNTHESIS BI: CPT

## 2025-05-23 PROCEDURE — 77067 SCR MAMMO BI INCL CAD: CPT

## 2025-05-23 PROCEDURE — 77080 DXA BONE DENSITY AXIAL: CPT

## 2025-05-28 ENCOUNTER — RESULTS FOLLOW-UP (OUTPATIENT)
Dept: GYNECOLOGY | Facility: CLINIC | Age: 70
End: 2025-05-28

## 2025-05-28 DIAGNOSIS — M81.0 OSTEOPOROSIS, UNSPECIFIED OSTEOPOROSIS TYPE, UNSPECIFIED PATHOLOGICAL FRACTURE PRESENCE: Primary | ICD-10-CM

## 2025-06-03 ENCOUNTER — DOCUMENTATION (OUTPATIENT)
Dept: ADMINISTRATIVE | Facility: OTHER | Age: 70
End: 2025-06-03

## 2025-06-03 NOTE — PROGRESS NOTES
06/03/25 3:51 PM     DM A1c outreach is not required, patient has an upcoming appointment with the Endocrinologist office. LV[facility]    Thank you.  Jenn Montoya MA  [facility]

## 2025-06-20 ENCOUNTER — VBI (OUTPATIENT)
Dept: ADMINISTRATIVE | Facility: OTHER | Age: 70
End: 2025-06-20

## 2025-06-20 NOTE — TELEPHONE ENCOUNTER
06/20/25 12:50 PM     Chart reviewed for Hemoglobin A1c was/were not submitted to the patient's insurance.     Placido Richey MA   [facility]

## 2025-06-25 ENCOUNTER — TELEPHONE (OUTPATIENT)
Dept: GYNECOLOGY | Facility: CLINIC | Age: 70
End: 2025-06-25

## 2025-06-25 NOTE — TELEPHONE ENCOUNTER
Spoke to Virgie and made her aware Blood work needs to be done prior to appt. to discuss Dexa and Bloodwork. Pt. Would like to cancel appt for 6/27/25 and states She will call back to reschedule.

## 2025-06-26 NOTE — TELEPHONE ENCOUNTER
Patient called in and asking why she needs bloodwork and Dexa scan done. She is unsure of this and would like a call back.   Please advise.  Thank You

## 2025-06-26 NOTE — TELEPHONE ENCOUNTER
Pt. Made aware BW needs to be done to rule out any other causes of her osteoporosis. Pt. States She will call back for appt to discuss Dexa and BW

## 2025-07-16 ENCOUNTER — VBI (OUTPATIENT)
Dept: ADMINISTRATIVE | Facility: OTHER | Age: 70
End: 2025-07-16

## 2025-07-16 NOTE — TELEPHONE ENCOUNTER
07/16/25 10:26 AM     Chart reviewed for Hemoglobin A1c was/were not submitted to the patient's insurance.     Placido Richey MA   [facility]